# Patient Record
Sex: FEMALE | Race: BLACK OR AFRICAN AMERICAN | Employment: FULL TIME | URBAN - METROPOLITAN AREA
[De-identification: names, ages, dates, MRNs, and addresses within clinical notes are randomized per-mention and may not be internally consistent; named-entity substitution may affect disease eponyms.]

---

## 2017-02-09 ENCOUNTER — ROUTINE PRENATAL (OUTPATIENT)
Dept: OBGYN CLINIC | Age: 22
End: 2017-02-09

## 2017-02-09 ENCOUNTER — HOSPITAL ENCOUNTER (OUTPATIENT)
Dept: LAB | Age: 22
Discharge: HOME | End: 2017-02-09

## 2017-02-09 VITALS
RESPIRATION RATE: 18 BRPM | SYSTOLIC BLOOD PRESSURE: 124 MMHG | HEART RATE: 86 BPM | BODY MASS INDEX: 32.44 KG/M2 | DIASTOLIC BLOOD PRESSURE: 90 MMHG | WEIGHT: 190 LBS | HEIGHT: 64 IN

## 2017-02-09 DIAGNOSIS — Z34.81 PRENATAL CARE, SUBSEQUENT PREGNANCY, FIRST TRIMESTER: Primary | ICD-10-CM

## 2017-02-09 DIAGNOSIS — Z3A.11 11 WEEKS GESTATION OF PREGNANCY: ICD-10-CM

## 2017-02-09 DIAGNOSIS — Z34.81 PRENATAL CARE, SUBSEQUENT PREGNANCY, FIRST TRIMESTER: ICD-10-CM

## 2017-02-09 LAB — SENTARA SPECIMEN COL,SENBCF: NORMAL

## 2017-02-09 PROCEDURE — 99001 SPECIMEN HANDLING PT-LAB: CPT | Performed by: OBSTETRICS & GYNECOLOGY

## 2017-02-09 NOTE — PROGRESS NOTES
PRENATAL INTAKE SUMMARY    Ms. Jabari Topete presents today for her first prenatal visit. She is  at 11w1d. Working Estimated Date of Delivery: 17 by Patient's last menstrual period was 2016. and confirmed with ultrasound. I have reviewed the patient's medical, obstetrical, social, and family histories, medications, and available lab results. SUBJECTIVE  She complains of peeling and itching of the nipples bilaterally    OBJECTIVE  Initial Physical Exam (New OB)    GENERAL APPEARANCE: {appearance:453607::\"alert, well appearing\",\"in no apparent distress. HEAD: normocephalic, atraumatic  MOUTH: mucous membranes moist, pharynx normal without lesions  THYROID: no thyromegaly or masses present  BREASTS: no masses noted, no significant tenderness, no palpable axillary nodes, peeling and flaking of the nipples bilaterally with slight weeping of the skin on right  LUNGS: clear to auscultation, no wheezes, rales or rhonchi, symmetric air entry  HEART: regular rate and rhythm, no murmurs  ABDOMEN: soft, nontender, nondistended, no abnormal masses, no epigastric pain, fundus not palpable and FHT present  EXTREMITIES: no redness or tenderness in the calves or thighs, no edema  SKIN: normal coloration and turgor, no rashes  LYMPH NODES: no adenopathy palpable  NEUROLOGIC: alert, oriented, normal speech, no focal findings or movement disorder noted    PELVIC EXAM EXTERNAL GENITALIA: normal appearing vulva with no masses, tenderness or lesions  VAGINA: no abnormal discharge or lesions  CERVIX: no lesions or cervical motion tenderness  UTERUS: gravid  ADNEXA: no masses palpable and nontender  OB EXAM PELVIMETRY: appears adequate    ASSESSMENT  Normal pregnancy  Skin changes of the nipples. Patient instructed to use Aquaphor for moisturization. If condition continues or worsens will consider skin biopsy.     PLAN  Prenatal care  See orders

## 2017-02-10 ENCOUNTER — TELEPHONE (OUTPATIENT)
Dept: OBGYN CLINIC | Age: 22
End: 2017-02-10

## 2017-02-10 LAB
ABSOLUTE LYMPHOCYTE COUNT, 10803: 2 K/UL (ref 1–4.8)
BASOPHILS # BLD: 0 K/UL (ref 0–0.2)
BASOPHILS NFR BLD: 0 % (ref 0–2)
EOSINOPHIL # BLD: 0.2 K/UL (ref 0–0.5)
EOSINOPHIL NFR BLD: 3 % (ref 0–6)
ERYTHROCYTE [DISTWIDTH] IN BLOOD BY AUTOMATED COUNT: 13.2 % (ref 10–16)
GRANULOCYTES,GRANS: 61 % (ref 40–75)
HCT VFR BLD AUTO: 38.2 % (ref 35.1–46.5)
HEP B SURFACE AG SCRN, 006510: NORMAL
HGB BLD-MCNC: 12.9 G/DL (ref 11.7–15.5)
HIV -1/0/2 AG/AB WITH REFLEX, 13463: NON REACTIVE
HIV 1 & 2 AB SER-IMP: NORMAL
LYMPHOCYTES, LYMLT: 28 % (ref 27–45)
MCH RBC QN AUTO: 30 PG (ref 26–34)
MCHC RBC AUTO-ENTMCNC: 34 G/DL (ref 32–36)
MCV RBC AUTO: 88 FL (ref 80–95)
MONOCYTES # BLD: 0.5 K/UL (ref 0.1–0.9)
MONOCYTES NFR BLD: 7 % (ref 3–9)
NEUTROPHILS # BLD AUTO: 4.4 K/UL (ref 1.8–7.7)
PLATELET # BLD AUTO: 134 K/UL (ref 140–440)
PMV BLD AUTO: 14 FL (ref 6–10.8)
RBC # BLD AUTO: 4.36 M/UL (ref 3.8–5.2)
RUBV IGG SERPL IA-ACNC: 4.3 AI
SYPHILIS (T. PALLIDUM) IGG, 15809: NON REACTIVE
WBC # BLD AUTO: 7.2 K/UL (ref 4–11)

## 2017-02-10 NOTE — TELEPHONE ENCOUNTER
Office phlebotomist was out of the office yesterday. Patient have OB panel drawn at [address] [facility] call from [facility]. Urine culture was order but they did not rcv a specimen.

## 2017-02-13 PROBLEM — D69.6 THROMBOCYTOPENIA AFFECTING PREGNANCY (HCC): Status: ACTIVE | Noted: 2017-02-13

## 2017-02-13 PROBLEM — O99.119 THROMBOCYTOPENIA AFFECTING PREGNANCY (HCC): Status: ACTIVE | Noted: 2017-02-13

## 2017-02-13 PROBLEM — Z67.91 RH NEGATIVE STATE IN ANTEPARTUM PERIOD: Status: ACTIVE | Noted: 2017-02-13

## 2017-02-13 PROBLEM — O26.899 RH NEGATIVE STATE IN ANTEPARTUM PERIOD: Status: ACTIVE | Noted: 2017-02-13

## 2017-02-14 LAB
CHLAMYDIA TRACHOMATIS THINPREP, 13342: NEGATIVE
NEISSERIA GONORRHOEAE THINPREP, 13343: NEGATIVE
PAP IMAGE GUIDED, 8900296: NORMAL
TRICHOMONAS NUC AMP-THIN PREP,13357: NEGATIVE

## 2017-02-16 LAB
ABO + RH BLD: NORMAL
ANTIBODY SCREEN INTERPRETATION, 14017: NEGATIVE
HEMOGLOBIN A1,HGBE1: 97 % (ref 96–98)
HEMOGLOBIN A2,HGBE2: 3 % (ref 2–4)
HEMOGLOBIN C,HGBE5: 0 % (ref 0–0)
HEMOGLOBIN D, 7336: 0 % (ref 0–0)
HEMOGLOBIN ELECTROPHORESIS INTERPRETATION, 406: NORMAL
HEMOGLOBIN F,HGBE3: 0 % (ref 0–0.1)
HEMOGLOBIN S SCREEN, 7338: NORMAL
HEMOGLOBIN S,HGBE4: 0 % (ref 0–0)
HEMOGLOBIN UNKNOWN, 7337: 0 % (ref 0–0)

## 2017-02-18 ENCOUNTER — HOSPITAL ENCOUNTER (EMERGENCY)
Age: 22
Discharge: HOME | End: 2017-02-18
Attending: EMERGENCY MEDICINE
Payer: COMMERCIAL

## 2017-02-18 VITALS
HEIGHT: 64 IN | SYSTOLIC BLOOD PRESSURE: 110 MMHG | HEART RATE: 86 BPM | RESPIRATION RATE: 21 BRPM | DIASTOLIC BLOOD PRESSURE: 68 MMHG | TEMPERATURE: 98.8 F | BODY MASS INDEX: 30.73 KG/M2 | WEIGHT: 180 LBS | OXYGEN SATURATION: 100 %

## 2017-02-18 DIAGNOSIS — E87.6 HYPOKALEMIA: ICD-10-CM

## 2017-02-18 DIAGNOSIS — O20.0 THREATENED MISCARRIAGE: Primary | ICD-10-CM

## 2017-02-18 DIAGNOSIS — Z29.13 NEED FOR RHOGAM DUE TO RH NEGATIVE MOTHER: ICD-10-CM

## 2017-02-18 LAB
ABO + RH BLD: NORMAL
ANION GAP BLD CALC-SCNC: 10 MMOL/L (ref 3–18)
APPEARANCE UR: CLEAR
BASOPHILS # BLD AUTO: 0 K/UL (ref 0–0.06)
BASOPHILS # BLD: 0 % (ref 0–2)
BILIRUB UR QL: NEGATIVE
BLD PROD TYP BPU: NORMAL
BLOOD GROUP ANTIBODIES SERPL: NORMAL
BPU ID: NORMAL
BUN SERPL-MCNC: 5 MG/DL (ref 7–18)
BUN/CREAT SERPL: 9 (ref 12–20)
CALCIUM SERPL-MCNC: 9 MG/DL (ref 8.5–10.1)
CHLORIDE SERPL-SCNC: 105 MMOL/L (ref 100–108)
CO2 SERPL-SCNC: 23 MMOL/L (ref 21–32)
COLOR UR: YELLOW
CREAT SERPL-MCNC: 0.58 MG/DL (ref 0.6–1.3)
DIFFERENTIAL METHOD BLD: NORMAL
EOSINOPHIL # BLD: 0.1 K/UL (ref 0–0.4)
EOSINOPHIL NFR BLD: 1 % (ref 0–5)
ERYTHROCYTE [DISTWIDTH] IN BLOOD BY AUTOMATED COUNT: 12.8 % (ref 11.6–14.5)
GLUCOSE SERPL-MCNC: 85 MG/DL (ref 74–99)
GLUCOSE UR STRIP.AUTO-MCNC: NEGATIVE MG/DL
HCT VFR BLD AUTO: 39.1 % (ref 35–45)
HGB BLD-MCNC: 13.4 G/DL (ref 12–16)
HGB UR QL STRIP: NEGATIVE
KETONES UR QL STRIP.AUTO: ABNORMAL MG/DL
LEUKOCYTE ESTERASE UR QL STRIP.AUTO: NEGATIVE
LYMPHOCYTES # BLD AUTO: 25 % (ref 21–52)
LYMPHOCYTES # BLD: 1.9 K/UL (ref 0.9–3.6)
MCH RBC QN AUTO: 30 PG (ref 24–34)
MCHC RBC AUTO-ENTMCNC: 34.3 G/DL (ref 31–37)
MCV RBC AUTO: 87.7 FL (ref 74–97)
MONOCYTES # BLD: 0.3 K/UL (ref 0.05–1.2)
MONOCYTES NFR BLD AUTO: 4 % (ref 3–10)
NEUTS SEG # BLD: 5.3 K/UL (ref 1.8–8)
NEUTS SEG NFR BLD AUTO: 70 % (ref 40–73)
NITRITE UR QL STRIP.AUTO: NEGATIVE
PH UR STRIP: 6 [PH] (ref 5–8)
PLATELET # BLD AUTO: 137 K/UL (ref 135–420)
PMV BLD AUTO: 11.7 FL (ref 9.2–11.8)
POTASSIUM SERPL-SCNC: 3.4 MMOL/L (ref 3.5–5.5)
PROT UR STRIP-MCNC: NEGATIVE MG/DL
RBC # BLD AUTO: 4.46 M/UL (ref 4.2–5.3)
SERVICE CMNT-IMP: NORMAL
SODIUM SERPL-SCNC: 138 MMOL/L (ref 136–145)
SP GR UR REFRACTOMETRY: 1.03 (ref 1–1.03)
SPECIMEN EXP DATE BLD: NORMAL
STATUS OF UNIT,%ST: NORMAL
UNIT DIVISION, %UDIV: 0
UROBILINOGEN UR QL STRIP.AUTO: 0.2 EU/DL (ref 0.2–1)
WBC # BLD AUTO: 7.8 K/UL (ref 4.6–13.2)
WET PREP GENITAL: NORMAL

## 2017-02-18 PROCEDURE — 99284 EMERGENCY DEPT VISIT MOD MDM: CPT

## 2017-02-18 PROCEDURE — 81003 URINALYSIS AUTO W/O SCOPE: CPT | Performed by: EMERGENCY MEDICINE

## 2017-02-18 PROCEDURE — 74011250637 HC RX REV CODE- 250/637: Performed by: EMERGENCY MEDICINE

## 2017-02-18 PROCEDURE — 74011250636 HC RX REV CODE- 250/636: Performed by: EMERGENCY MEDICINE

## 2017-02-18 PROCEDURE — 96372 THER/PROPH/DIAG INJ SC/IM: CPT

## 2017-02-18 PROCEDURE — 96361 HYDRATE IV INFUSION ADD-ON: CPT

## 2017-02-18 PROCEDURE — 96360 HYDRATION IV INFUSION INIT: CPT

## 2017-02-18 PROCEDURE — 86900 BLOOD TYPING SEROLOGIC ABO: CPT | Performed by: EMERGENCY MEDICINE

## 2017-02-18 PROCEDURE — 36415 COLL VENOUS BLD VENIPUNCTURE: CPT | Performed by: EMERGENCY MEDICINE

## 2017-02-18 PROCEDURE — 87491 CHLMYD TRACH DNA AMP PROBE: CPT | Performed by: EMERGENCY MEDICINE

## 2017-02-18 PROCEDURE — 85025 COMPLETE CBC W/AUTO DIFF WBC: CPT | Performed by: EMERGENCY MEDICINE

## 2017-02-18 PROCEDURE — 80048 BASIC METABOLIC PNL TOTAL CA: CPT | Performed by: EMERGENCY MEDICINE

## 2017-02-18 PROCEDURE — 87210 SMEAR WET MOUNT SALINE/INK: CPT | Performed by: EMERGENCY MEDICINE

## 2017-02-18 RX ORDER — POTASSIUM CHLORIDE 20 MEQ/1
40 TABLET, EXTENDED RELEASE ORAL
Status: COMPLETED | OUTPATIENT
Start: 2017-02-18 | End: 2017-02-18

## 2017-02-18 RX ADMIN — HUMAN RHO(D) IMMUNE GLOBULIN 0.3 MG: 300 INJECTION, SOLUTION INTRAMUSCULAR at 12:36

## 2017-02-18 RX ADMIN — SODIUM CHLORIDE 1000 ML: 900 INJECTION, SOLUTION INTRAVENOUS at 10:41

## 2017-02-18 RX ADMIN — POTASSIUM CHLORIDE 40 MEQ: 20 TABLET, EXTENDED RELEASE ORAL at 11:46

## 2017-02-18 NOTE — ED NOTES
Pt discharged to home ambulatory and in company of friends  Discharge instructions provided via discussion and handout. Teaching to patient. Verbalized understanding. No questions voiced. Discharged with 0 RX.

## 2017-02-18 NOTE — ED NOTES
Bedside and Verbal shift change report given to Mohsen Malik (oncoming nurse) by Searcy Koyanagi (offgoing nurse). Report included the following information SBAR and ED Summary.

## 2017-02-18 NOTE — DISCHARGE INSTRUCTIONS
Threatened Miscarriage: Care Instructions  Your Care Instructions    Some women have light spotting or bleeding during the first 12 weeks of pregnancy. In some cases this is normal. Light spotting or bleeding can also be a sign of a possible loss of the pregnancy. This is called a threatened miscarriage. At this point, the doctor may not be able to tell if your vaginal bleeding is normal or is a sign of a miscarriage. In early pregnancy, things such as stress, exercise, and sex do not cause miscarriage. You may be worried or upset about the possibility of losing your pregnancy. But do not blame yourself. There is no treatment to stop a threatened miscarriage. If you do have a miscarriage, there was nothing you could have done to prevent it. A miscarriage usually means that the pregnancy is not developing normally. The doctor has checked you carefully, but problems can develop later. If you notice any problems or new symptoms, get medical treatment right away. Follow-up care is a key part of your treatment and safety. Be sure to make and go to all appointments, and call your doctor if you are having problems. It's also a good idea to know your test results and keep a list of the medicines you take. How can you care for yourself at home? · If you do have a miscarriage, you will probably have some vaginal bleeding for 1 to 2 weeks. Use pads instead of tampons. · Take acetaminophen (Tylenol) for cramps. Read and follow all instructions on the label. · Do not take two or more pain medicines at the same time unless the doctor told you to. Many pain medicines have acetaminophen, which is Tylenol. Too much acetaminophen (Tylenol) can be harmful. · Do not have sex until your doctor says it is okay. · Get lots of rest over the next several days. · You may do your normal activities if you feel well enough to do them. But do not do any heavy exercise until your doctor says it is okay.   · Eat a balanced diet that is high in iron and vitamin C. Foods rich in iron include red meat, shellfish, eggs, beans, and leafy green vegetables. Foods high in vitamin C include citrus fruits, tomatoes, and broccoli. Talk to your doctor about whether you need to take iron pills or a multivitamin. · Do not drink alcohol or use tobacco or illegal drugs. · Do not smoke. If you need help quitting, talk to your doctor about stop-smoking programs and medicines. These can increase your chances of quitting for good. When should you call for help? Call 911 anytime you think you may need emergency care. For example, call if:  · You have sudden, severe pain in your belly or pelvis. · You passed out (lost consciousness). · You have severe vaginal bleeding. Call your doctor now or seek immediate medical care if:  · You are dizzy or lightheaded, or you feel like you may faint. · You have new or increased pain in your belly or pelvis. · Your vaginal bleeding is getting worse. · You have increased pain in the vaginal area. · You have a fever. · You think you may have passed tissue. Save any tissue that you pass. Take it to your doctor's office as soon as you can. Watch closely for changes in your health, and be sure to contact your doctor if:  · You have new or worse vaginal discharge. · You do not get better as expected. Where can you learn more? Go to http://rosalio-emely.info/. Enter B599 in the search box to learn more about \"Threatened Miscarriage: Care Instructions. \"  Current as of: May 30, 2016  Content Version: 11.1  © 0873-9347 [employer]. Care instructions adapted under license by [employer] (which disclaims liability or warranty for this information). If you have questions about a medical condition or this instruction, always ask your healthcare professional. [address] any warranty or liability for your use of this information.          Hypokalemia: Care Instructions  Your Care Instructions  Hypokalemia (say \"jn-pn-mkj-RAINER-agustín-uh\") is a low level of potassium. The heart, muscles, kidneys, and nervous system all need potassium to work well. This problem has many different causes. Kidney problems, diet, and medicines like diuretics and laxatives can cause it. So can vomiting or diarrhea. In some cases, cancer is the cause. Your doctor may do tests to find the cause of your low potassium levels. You may need medicines to bring your potassium levels back to normal. You may also need regular blood tests to check your potassium. If you have very low potassium, you may need intravenous (IV) medicines. You also may need tests to check the electrical activity of your heart. Heart problems caused by low potassium levels can be very serious. Follow-up care is a key part of your treatment and safety. Be sure to make and go to all appointments, and call your doctor if you are having problems. It's also a good idea to know your test results and keep a list of the medicines you take. How can you care for yourself at home? · If your doctor recommends it, eat foods that have a lot of potassium. These include fresh fruits, juices, and vegetables. They also include nuts, beans, and milk. · Be safe with medicines. If your doctor prescribes medicines or potassium supplements, take them exactly as directed. Call your doctor if you have any problems with your medicines. · Get your potassium levels tested as often as your doctor tells you. When should you call for help? Call 911 anytime you think you may need emergency care. For example, call if:  · You feel like your heart is missing beats. Heart problems caused by low potassium can cause death. · You passed out (lost consciousness). · You have a seizure. Call your doctor now or seek immediate medical care if:  · You feel weak or unusually tired. · You have severe arm or leg cramps. · You have tingling or numbness.   · You feel sick to your stomach, or you vomit. · You have belly cramps. · You feel bloated or constipated. · You have to urinate a lot. · You feel very thirsty most of the time. · You are dizzy or lightheaded, or you feel like you may faint. · You feel depressed, or you lose touch with reality. Watch closely for changes in your health, and be sure to contact your doctor if:  · You do not get better as expected. Where can you learn more? Go to http://rosalio-emely.info/. Enter G358 in the search box to learn more about \"Hypokalemia: Care Instructions. \"  Current as of: July 28, 2016  Content Version: 11.1  © 3566-9554 [employer]. Care instructions adapted under license by [employer] (which disclaims liability or warranty for this information). If you have questions about a medical condition or this instruction, always ask your healthcare professional. [address] any warranty or liability for your use of this information.

## 2017-02-18 NOTE — ED PROVIDER NOTES
HPI Comments: Patient with history of HTN, A1 at 12w3d by last OB ultrasound presents via walk-in triage with abdominal cramping and vaginal bleeding. Reports bright red blood clots started today denies any fever or chills prior vaginal discharge. Has had one prior spontaneous , followed by Dr. Awilda Conklin. Patient is a 24 y.o. female presenting with vaginal bleeding. Vaginal Bleeding   Pertinent negatives include no chest pain, no headaches and no shortness of breath. Past Medical History:   Diagnosis Date    Anxiety     Depression        Past Surgical History:   Procedure Laterality Date    Hx hernia repair  childhood         Family History:   Problem Relation Age of Onset    No Known Problems Mother     Diabetes Father     No Known Problems Maternal Grandmother     No Known Problems Maternal Grandfather     No Known Problems Paternal Grandmother     No Known Problems Paternal Grandfather        Social History     Social History    Marital status: SINGLE     Spouse name: N/A    Number of children: N/A    Years of education: N/A     Occupational History    Not on file. Social History Main Topics    Smoking status: Never Smoker    Smokeless tobacco: Not on file    Alcohol use No    Drug use: No    Sexual activity: Yes     Partners: Male     Birth control/ protection: None     Other Topics Concern    Not on file     Social History Narrative         ALLERGIES: Tramadol    Review of Systems   Constitutional: Negative for fever. HENT: Negative for nosebleeds. Eyes: Negative for visual disturbance. Respiratory: Negative for shortness of breath. Cardiovascular: Negative for chest pain. Gastrointestinal: Negative for blood in stool. Genitourinary: Positive for vaginal bleeding. Negative for dysuria. Musculoskeletal: Negative for myalgias. Skin: Negative for rash. Neurological: Negative for headaches.    All other systems reviewed and are negative. Vitals:    02/18/17 1034 02/18/17 1054   Temp:  98.8 °F (37.1 °C)   Weight: 81.6 kg (180 lb)    Height: 5' 4\" (1.626 m)             Physical Exam   Constitutional:   General:  Well-developed, well-nourished, no apparent distress. Head:  Normocephalic atraumatic. Eyes:  Pupils midrange extraocular movements intact. No pallor or conjunctival injection. Nose:  No rhinorrhea, inspection grossly normal.    Ears:  Grossly normal to inspection, no discharge. Mouth:  Mucous membranes moist, no appreciable intraoral lesion. Neck:  Trachea midline, no asymmetry. Chest:  Grossly normal inspection, symmetric chest rise. Pulmonary:  Clear to auscultation bilaterally no wheezes rhonchi or rales. Cardiovascular:  S1-S2 no murmurs rubs or gallops. Abdomen: Soft, nontender, nondistended no guarding rebound or peritoneal signs. Pelvic:  RN Jared Solders present, serving as a female chaperone. External exam: no appreciable erythema/ecchymosis or lesion. Speculum:  OS closed, no discharge. Bimanual exam: Deferred  Extremities:  Grossly normal to inspection, peripheral pulses intact. Neurologic:  Alert and oriented no appreciable focal neurologic deficit. Psychiatric:  Grossly normal mood and affect. Nursing note reviewed, vital signs reviewed. MDM  Number of Diagnoses or Management Options  Hypokalemia:   Need for rhogam due to Rh negative mother:   Threatened miscarriage:   Diagnosis management comments: ED course:  Patient presents with vaginal bleeding, early pregnancy, Rh- in EMR. We'll give prophylaxis, pelvic examination labs and consulted OB. Bedside ultrasound intrauterine pregnancy grossly fetal movements present. Rh- 9 days ago, will order RhoGAM     Consult:  Discussed care with Dr. Aloma Krabbe (OB). Standard discussion; including history of patients chief complaint, available diagnostic results, and treatment course.   Agrees with RhoGAM, close OB/GYN follow-up  \  Patient presenting with early pregnancy. IUP was confirmed. Discussed the ramifications of her presentation with regards to her risk for completing a miscarriage in addition with her other possible complications. Understands that she will follow-up with OB/GYN without fail for further management, return here with any worrisome signs. She was given strict return precautions. Patient was instructed to return to the emergency department with any concerns. Disposition:    Discharged home      Portions of this chart were created with Dragon medical speech to text program.   Unrecognized errors may be present.         ED Course       Procedures

## 2017-02-18 NOTE — ED TRIAGE NOTES
Pt c/o vaginal bleeding since last night. Reports miscarriage last September. \" i think i am having another miscarriage. \"

## 2017-02-20 LAB
C TRACH RRNA SPEC QL NAA+PROBE: NEGATIVE
N GONORRHOEA RRNA SPEC QL NAA+PROBE: NEGATIVE
SPECIMEN SOURCE: NORMAL

## 2017-03-09 ENCOUNTER — ROUTINE PRENATAL (OUTPATIENT)
Dept: OBGYN CLINIC | Age: 22
End: 2017-03-09

## 2017-03-09 VITALS
SYSTOLIC BLOOD PRESSURE: 132 MMHG | RESPIRATION RATE: 18 BRPM | HEART RATE: 90 BPM | WEIGHT: 184 LBS | BODY MASS INDEX: 31.41 KG/M2 | DIASTOLIC BLOOD PRESSURE: 73 MMHG | HEIGHT: 64 IN

## 2017-03-09 DIAGNOSIS — D69.6 THROMBOCYTOPENIA AFFECTING PREGNANCY (HCC): ICD-10-CM

## 2017-03-09 DIAGNOSIS — O99.119 THROMBOCYTOPENIA AFFECTING PREGNANCY (HCC): ICD-10-CM

## 2017-03-09 DIAGNOSIS — Z3A.15 15 WEEKS GESTATION OF PREGNANCY: ICD-10-CM

## 2017-03-09 DIAGNOSIS — Z34.82 PRENATAL CARE, SUBSEQUENT PREGNANCY, SECOND TRIMESTER: Primary | ICD-10-CM

## 2017-03-09 NOTE — MR AVS SNAPSHOT
Visit Information Date & Time Provider Department Dept. Phone Encounter #  
 3/9/2017 10:30 AM Stephany Marin [facility] [phone] [id number] Follow-up Instructions Return in about 4 weeks (around 4/6/2017). Follow-up and Disposition History Your Appointments 3/9/2017 10:30 AM  
OB VISIT with Jessie Arreola DO  
[facility] ([facility]) Appt Note: ob  
 [address] [phone]  
[phone]  
  
   
 [facility] [phone]  
  
    
 4/5/2017  9:00 AM  
ULTRASOUND FOLLOW UP with Obgyn Ultrasound [address] [facility] ([facility]) [address] [place] [address] [address] [phone]  
  
   
 [address] [place] [address] [address] [phone] Upcoming Health Maintenance Date Due  
 HPV AGE 9Y-34Y (1 of 3 - Female 3 Dose Series) 9/22/2006 INFLUENZA AGE 9 TO ADULT 8/1/2016 PAP AKA CERVICAL CYTOLOGY 2/9/2020 Allergies as of 3/9/2017  Review Complete On: 3/9/2017 By: Jessie Arreola DO Severity Noted Reaction Type Reactions Tramadol  09/19/2016    Itching Current Immunizations  Never Reviewed Name Date Rho(D) Immune Globulin - IM 2/18/2017 12:36 PM  
  
 Not reviewed this visit You Were Diagnosed With   
  
 Codes Comments Prenatal care, subsequent pregnancy, second trimester    -  Primary ICD-10-CM: Z34.82 
ICD-9-CM: V22.1 15 weeks gestation of pregnancy     ICD-10-CM: Z3A.15 
ICD-9-CM: V22.2 Thrombocytopenia affecting pregnancy ([facility] [address]     ICD-10-CM: O99.119, D69.6 ICD-9-CM: 649.30, 287.5 Vitals BP Pulse Resp Height(growth percentile) Weight(growth percentile) LMP  
 132/73 90 18 5' 4\" (1.626 m) 184 lb (83.5 kg) 11/23/2016 BMI OB Status Smoking Status 31.58 kg/m2 Pregnant Never Smoker Vitals History BMI and BSA Data Body Mass Index Body Surface Area  31.58 kg/m 2 1.94 m 2  
  
  
 Your Updated Medication List  
  
   
This list is accurate as of: 3/9/17 10:08 AM.  Always use your most recent med list.  
  
  
  
  
 pnv w/o calcium-iron fum-fa 27-1 mg Tab Take  by mouth. We Performed the Following PLATELET [31823 CPT(R)] Follow-up Instructions Return in about 4 weeks (around 4/6/2017). To-Do List   
 03/09/2017 Lab:  PLATELET   
  
 14/97/3903 Imaging: AMB POC US OB >= 14 WKS, 1ST GESTATION Introducing [facility]! Alirio Diana introduces Funderbeam patient portal. Now you can access parts of your medical record, email your doctor's office, and request medication refills online. 1. In your internet browser, go to [email or web address] 2. Click on the First Time User? Click Here link in the Sign In box. You will see the New Member Sign Up page. 3. Enter your Funderbeam Access Code exactly as it appears below. You will not need to use this code after youve completed the sign-up process. If you do not sign up before the expiration date, you must request a new code. · Funderbeam Access Code: [email or web address] Expires: 5/19/2017 11:02 AM 
 
4. Enter the last four digits of your Social Security Number (xxxx) and Date of Birth (mm/dd/yyyy) as indicated and click Submit. You will be taken to the next sign-up page. 5. Create a Funderbeam ID. This will be your Funderbeam login ID and cannot be changed, so think of one that is secure and easy to remember. 6. Create a Funderbeam password. You can change your password at any time. 7. Enter your Password Reset Question and Answer. This can be used at a later time if you forget your password. 8. Enter your e-mail address. You will receive e-mail notification when new information is available in [address] 9. Click Sign Up. You can now view and download portions of your medical record. 10. Click the Download Summary menu link to download a portable copy of your medical information. If you have questions, please visit the Frequently Asked Questions section of the "[employer]t website. Remember, [employer]wi [employer] is NOT to be used for urgent needs. For medical emergencies, dial 911. Now available from your iPhone and Android! Please provide this summary of care documentation to your next provider. Your primary care clinician is listed as NONE. If you have any questions after today's visit, please call [phone].

## 2017-03-09 NOTE — PROGRESS NOTES
Patient without complaints, states that the dry skin on her nipples is still present but improved. She was seen at the ED 3 weeks ago for vaginal bleeding and given Rhogam. She has had no further bleeding since then. Breast exam shows dry and flaking skin uniformly across bilateral nipples. No discrete lesions and no weeping of the skin. Patient encouraged to continue the use of Aquaphor. Mild thrombocytopenia noted on prenatal labs, will recheck today. Morphology scan ordered. Follow up 4 weeks.

## 2017-03-10 DIAGNOSIS — D69.6 THROMBOCYTOPENIA AFFECTING PREGNANCY (HCC): Primary | ICD-10-CM

## 2017-03-10 DIAGNOSIS — O99.119 THROMBOCYTOPENIA AFFECTING PREGNANCY (HCC): Primary | ICD-10-CM

## 2017-03-10 LAB — PLATELET # BLD AUTO: 126 K/UL (ref 140–440)

## 2017-03-20 NOTE — PROGRESS NOTES
Have attempted to contact the patient multiple times by phone and number is always busy. Referral to hematology entered due to thrombocytopenia in early pregnancy.

## 2017-03-31 ENCOUNTER — OFFICE VISIT (OUTPATIENT)
Dept: ONCOLOGY | Age: 22
End: 2017-03-31

## 2017-03-31 ENCOUNTER — HOSPITAL ENCOUNTER (OUTPATIENT)
Dept: INFUSION THERAPY | Age: 22
Discharge: HOME | End: 2017-03-31
Payer: COMMERCIAL

## 2017-03-31 VITALS
TEMPERATURE: 98 F | HEART RATE: 90 BPM | HEIGHT: 64 IN | WEIGHT: 200 LBS | DIASTOLIC BLOOD PRESSURE: 64 MMHG | OXYGEN SATURATION: 100 % | BODY MASS INDEX: 34.15 KG/M2 | SYSTOLIC BLOOD PRESSURE: 109 MMHG | RESPIRATION RATE: 17 BRPM

## 2017-03-31 VITALS
SYSTOLIC BLOOD PRESSURE: 109 MMHG | OXYGEN SATURATION: 100 % | RESPIRATION RATE: 17 BRPM | TEMPERATURE: 98 F | DIASTOLIC BLOOD PRESSURE: 64 MMHG | HEART RATE: 90 BPM

## 2017-03-31 DIAGNOSIS — Z67.91 RH NEGATIVE STATE IN ANTEPARTUM PERIOD, SECOND TRIMESTER: ICD-10-CM

## 2017-03-31 DIAGNOSIS — D69.6 THROMBOCYTOPENIA AFFECTING PREGNANCY (HCC): ICD-10-CM

## 2017-03-31 DIAGNOSIS — D69.6 THROMBOCYTOPENIA AFFECTING PREGNANCY (HCC): Primary | ICD-10-CM

## 2017-03-31 DIAGNOSIS — O99.119 THROMBOCYTOPENIA AFFECTING PREGNANCY (HCC): Primary | ICD-10-CM

## 2017-03-31 DIAGNOSIS — O99.119 THROMBOCYTOPENIA AFFECTING PREGNANCY (HCC): ICD-10-CM

## 2017-03-31 DIAGNOSIS — O26.892 RH NEGATIVE STATE IN ANTEPARTUM PERIOD, SECOND TRIMESTER: ICD-10-CM

## 2017-03-31 LAB
BASOPHILS # BLD AUTO: 0 K/UL (ref 0–0.06)
BASOPHILS # BLD: 0 % (ref 0–2)
DIFFERENTIAL METHOD BLD: ABNORMAL
EOSINOPHIL # BLD: 0.2 K/UL (ref 0–0.4)
EOSINOPHIL NFR BLD: 2 % (ref 0–5)
ERYTHROCYTE [DISTWIDTH] IN BLOOD BY AUTOMATED COUNT: 12.7 % (ref 11.6–14.5)
FERRITIN SERPL-MCNC: 23 NG/ML (ref 8–388)
FOLATE SERPL-MCNC: 19.5 NG/ML (ref 3.1–17.5)
HCT VFR BLD AUTO: 40 % (ref 35–45)
HGB BLD-MCNC: 13.4 G/DL (ref 12–16)
LYMPHOCYTES # BLD AUTO: 21 % (ref 21–52)
LYMPHOCYTES # BLD: 2.2 K/UL (ref 0.9–3.6)
MCH RBC QN AUTO: 29.7 PG (ref 24–34)
MCHC RBC AUTO-ENTMCNC: 33.5 G/DL (ref 31–37)
MCV RBC AUTO: 88.7 FL (ref 74–97)
MONOCYTES # BLD: 0.5 K/UL (ref 0.05–1.2)
MONOCYTES NFR BLD AUTO: 5 % (ref 3–10)
NEUTS SEG # BLD: 7.3 K/UL (ref 1.8–8)
NEUTS SEG NFR BLD AUTO: 72 % (ref 40–73)
PLATELET # BLD AUTO: 137 K/UL (ref 135–420)
PMV BLD AUTO: 13 FL (ref 9.2–11.8)
RBC # BLD AUTO: 4.51 M/UL (ref 4.2–5.3)
VIT B12 SERPL-MCNC: 484 PG/ML (ref 211–911)
WBC # BLD AUTO: 10.2 K/UL (ref 4.6–13.2)

## 2017-03-31 PROCEDURE — 82607 VITAMIN B-12: CPT | Performed by: INTERNAL MEDICINE

## 2017-03-31 PROCEDURE — 36415 COLL VENOUS BLD VENIPUNCTURE: CPT

## 2017-03-31 PROCEDURE — 85025 COMPLETE CBC W/AUTO DIFF WBC: CPT | Performed by: INTERNAL MEDICINE

## 2017-03-31 PROCEDURE — 85613 RUSSELL VIPER VENOM DILUTED: CPT | Performed by: INTERNAL MEDICINE

## 2017-03-31 PROCEDURE — 82728 ASSAY OF FERRITIN: CPT | Performed by: INTERNAL MEDICINE

## 2017-03-31 PROCEDURE — 86146 BETA-2 GLYCOPROTEIN ANTIBODY: CPT | Performed by: INTERNAL MEDICINE

## 2017-03-31 NOTE — MR AVS SNAPSHOT
Visit Information Date & Time Provider Department Dept. Phone Encounter #  
 3/31/2017 11:30 AM Ilir Robison, [place] [address] Oncology [phone] [id number] Follow-up Instructions Return in about 3 weeks (around 4/21/2017), or if symptoms worsen or fail to improve, for re-evaluate. Your Appointments 4/5/2017  9:00 AM  
ULTRASOUND FOLLOW UP with Obgyn Ultrasound [address] [facility] ([facility]) Appt Note: ob; morphology [facility] [address] [facility] [phone]  
  
    
 4/5/2017  9:30 AM  
OB VISIT with Jessa Bowman DO  
[facility] ([facility]) Appt Note: ob  
 [facility] [address] [facility] [address] [phone]  
  
    
 4/21/2017 11:00 AM  
Follow Up with Ilir Robison MD  
[address] [place] [address] Oncology [facility]) Appt Note: 3 week follow-up [address] [facility]  
  
   
 [id number] [place] [address] [address] [place] [address] Upcoming Health Maintenance Date Due  
 HPV AGE 9Y-34Y (1 of 3 - Female 3 Dose Series) 9/22/2006 INFLUENZA AGE 9 TO ADULT 8/1/2016 DTaP/Tdap/Td series (1 - Tdap) 9/22/2016 PAP AKA CERVICAL CYTOLOGY 2/9/2020 Allergies as of 3/31/2017  Review Complete On: 3/9/2017 By: Jessa Bowman DO Severity Noted Reaction Type Reactions Tramadol  09/19/2016    Itching Current Immunizations  Never Reviewed Name Date Rho(D) Immune Globulin - IM 2/18/2017 12:36 PM  
  
 Not reviewed this visit You Were Diagnosed With   
  
 Codes Comments Thrombocytopenia affecting pregnancy ([facility] [address]    -  Primary ICD-10-CM: O99.119, D69.6 ICD-9-CM: 649.30, 287.5 Rh negative state in antepartum period, second trimester     ICD-10-CM: O09.892 ICD-9-CM: V23.89 Vitals BP Pulse Temp Resp Height(growth percentile) Weight(growth percentile) 109/64 (BP 1 Location: Left arm, BP Patient Position: Sitting) 90 98 °F (36.7 °C) (Oral) 17 5' 4\" (1.626 m) 200 lb (90.7 kg) LMP SpO2 BMI OB Status Smoking Status 11/23/2016 100% 34.33 kg/m2 Pregnant Never Smoker Vitals History BMI and BSA Data Body Mass Index Body Surface Area  
 34.33 kg/m 2 2.02 m 2 Your Updated Medication List  
  
   
This list is accurate as of: 3/31/17  1:39 PM.  Always use your most recent med list.  
  
  
  
  
 pnv w/o calcium-iron fum-fa 27-1 mg Tab Take  by mouth. Follow-up Instructions Return in about 3 weeks (around 4/21/2017), or if symptoms worsen or fail to improve, for re-evaluate. To-Do List   
 03/31/2017 Lab:  ANTIPHOSPHOLIPID SYNDROME PANEL   
  
 03/31/2017 Lab:  CBC WITH AUTOMATED DIFF   
  
 03/31/2017 Lab:  FERRITIN   
  
 03/31/2017 Lab:  LUPUS ANTICOAGULANT PANEL W/ REFLEX   
  
 03/31/2017 Lab:  VITAMIN B12 & FOLATE Introducing [facility]! Dear Jayne Mata: Thank you for requesting a [employer] account. Our records indicate that you already have an active [employer] account. You can access your account anytime at [email or web address] Did you know that you can access your hospital and ER discharge instructions at any time in [employer]? You can also review all of your test results from your hospital stay or ER visit. Additional Information If you have questions, please visit the Frequently Asked Questions section of the [employer] website at [email or web address] Remember, [employer] is NOT to be used for urgent needs. For medical emergencies, dial 911. Now available from your iPhone and Android! Please provide this summary of care documentation to your next provider. Your primary care clinician is listed as NONE.  If you have any questions after today's visit, please call [phone].

## 2017-03-31 NOTE — PROGRESS NOTES
[facility] Progress Note    Date: 2017    Name: Moises Best    MRN: [id number]         : 1995      Ms. Conte was assessed and education was provided. Ms. Conte's vitals were reviewed and patient was observed for 5 minutes prior to treatment. Visit Vitals    /64 (BP 1 Location: Left arm, BP Patient Position: Sitting)    Pulse 90    Temp 98 °F (36.7 °C)    Resp 17    SpO2 100%       Lab results were obtained and reviewed. Recent Results (from the past 12 hour(s))   VITAMIN B12 & FOLATE    Collection Time: 17  1:20 PM   Result Value Ref Range    Vitamin B12 484 211 - 911 pg/mL    Folate 19.5 (H) 3.10 - 17.50 ng/mL   FERRITIN    Collection Time: 17  1:20 PM   Result Value Ref Range    Ferritin 23 8 - 388 NG/ML   CBC WITH AUTOMATED DIFF    Collection Time: 17  1:20 PM   Result Value Ref Range    WBC 10.2 4.6 - 13.2 K/uL    RBC 4.51 4.20 - 5.30 M/uL    HGB 13.4 12.0 - 16.0 g/dL    HCT 40.0 35.0 - 45.0 %    MCV 88.7 74.0 - 97.0 FL    MCH 29.7 24.0 - 34.0 PG    MCHC 33.5 31.0 - 37.0 g/dL    RDW 12.7 11.6 - 14.5 %    PLATELET 937 733 - 141 K/uL    MPV 13.0 (H) 9.2 - 11.8 FL    NEUTROPHILS 72 40 - 73 %    LYMPHOCYTES 21 21 - 52 %    MONOCYTES 5 3 - 10 %    EOSINOPHILS 2 0 - 5 %    BASOPHILS 0 0 - 2 %    ABS. NEUTROPHILS 7.3 1.8 - 8.0 K/UL    ABS. LYMPHOCYTES 2.2 0.9 - 3.6 K/UL    ABS. MONOCYTES 0.5 0.05 - 1.2 K/UL    ABS. EOSINOPHILS 0.2 0.0 - 0.4 K/UL    ABS. BASOPHILS 0.0 0.0 - 0.06 K/UL    DF AUTOMATED         Venipuncture to right antecubital vein x1 attempt, gauze and coban applied to site. Specimen sent to lab for processing. Ms. Lucía Metzger tolerated the infusion, and had no complaints. Patient armband removed and shredded. Ms. Lucía Metzger was discharged from [place] [address] in stable condition at 1335. She is to follow up with physician for her next appointment.     Prema English RN  2017  4:14 PM

## 2017-03-31 NOTE — PROGRESS NOTES
Megan Rubio is a 24 y.o. female who presents today to establish care    1. Have you been to the ER, urgent care clinic since your last visit? Hospitalized since your last visit? NO    2. Have you seen or consulted any other health care providers outside of the [address] [place] [address] since your last visit? Include any pap smears or colon screening. NO    Health Maintenance reviewed.     Health Maintenance Due   Topic Date Due    HPV AGE 9Y-26Y (1 of 3 - Female 3 Dose Series) 09/22/2006    INFLUENZA AGE 9 TO ADULT  08/01/2016    DTaP/Tdap/Td series (1 - Tdap) 09/22/2016

## 2017-03-31 NOTE — PROGRESS NOTES
[facility] HEMATOLOGY-MEDICAL ONCOLOGY     Patient Active Problem List   Diagnosis Code    [facility] O99.119, D69.6    Rh negative state in antepartum period O09.899         Assessment/ Plan:     1.) Thrombocytopenia of Pregnancy  -Mild ITP vs. Thrombocytopenia of pregnancy  -Lab workup ordered  -Currently 18 weeks gestation with her second pregnancy  -Platelet count today improved to 137K  -No previous history of low platelet count, splenomegaly, Hepatitis, or HIV  -RTC. In 3 weeks for a repeat CBC and to review workup    2.) Previous miscarriage  -APL-Antibodies and Lupus anticoag. Assay ordered    Thank you for the opportunity to participate in the care, please do not hesitate to call for any questions or concerns. I have discussed the diagnosis with the patient and the intended plan. The patient verbalized understanding and agrees with the plan. History:   Sarah Perez is a 24 y.o. female presenting today for: Thrombocytopenia of Pregnancy  -Mild ITP vs. Thrombocytopenia of pregnancy  -Lab workup ordered  -Currently 18 weeks gestation with her second pregnancy  -Platelet count today improved to 137K  -No previous history of low platelet count, splenomegaly, Hepatitis, or HIV      Current Outpatient Prescriptions   Medication Sig Dispense Refill    pnv w/o calcium-iron fum-fa 27-1 mg tab Take  by mouth.          Objective:   VS:    Visit Vitals    Resp 17    Ht 5' 4\" (1.626 m)    Wt 90.7 kg (200 lb)    LMP 11/23/2016    SpO2 100%    BMI 34.33 kg/m2        Physical Exam:     Constitutional:  well developed, well nourished, no acute distress and alert and oriented x 3    HENT:  Oral mucosa pink and moist, no cyanosis observed and no pallor observed.    Eyes:  conjunctiva normal, upper and lower eyelid normal bilaterally.    Neck:  No jugular venous distension present.    Cardiovascular:  heart sounds normal, normal rate and regular rhythm   Pulmonary/Chest Wall:  breath sounds are clear bilaterally and no use of accessory muscles in breathing.    Abdominal:  Non tender, no palpable masses, no hepatosplenomegaly, and no abdominal bruits. fundal ht. 18 wks. Musculoskeletal:  No digital clubbing or cyanosis. Normal muscle strength and tone.    Skin:  Normal and no rashes or lesions    Peripheral Vascular:  No edema present in extremities. Femoral pulse normal bilaterally. Dorsalis pedis pulse normal bilaterally. Review of Systems  Constitutional:  Fever: Negative, Chills: Negative, Weight Loss: Negative, Malaise/Fatigue: Negative  Diaphoresis: Negative, Weakness: Negative  Skin:  Rash: Negative, Itching: Negative  HENT:  Headache:Negative, Hearing Loss: Negative, Tinnitus: Negative, Ear Pain: Negative  Nosebleeds: Negative, Congestion: Negative, Stridor: Negative,  Sore Throat: Negative  Eyes:   Blurred Vision: Negative, Double Vision: Negative, Photophobia: Negative  Eye Pain: Negative, Eye Discharge: Negative, Eye Redness: Negative  Cardiovascular:   Chest Pain: Negative, Palpitations: Negative, Orthopnea: Negative  Claudication: Negative, Leg Swelling: Negative PND: Negative  Respiratory:  Cough: Negative, Hemoptysis: Negative, Sputum Production: Negative  Shortness of Breath: Negative, Wheezing: Negative  Gastrointestinal:  Heartburn: Negative, Nausea: Negative, Vomiting: Negative  Abdominal Pain: Negative, Diarrhea: Negative, Constipation: Negative  Blood in Stool: Negative, Melena: Negative   Genitourinary:   Dysuria: Negative, Urgency: Negative, Frequency: Negative  Hematuria: Negative, Flank Pain: Negative  Musculoskeletal:   Myalgias: Negative, Neck Pain: Negative, Back Pain: Negative    Joint Pain: Negative, Falls: Negative  Endo/Heme/Allergies:   Easy Bruise/Blood: Negative, Env. Allergies: Negative, Polydipsia: Negative   Neurological:   Dizziness: Negative, Tingling: Negative.  Tremor: Negative,    Sensory Change: Negative. Speech Change: Negative, Focal Weakness: Negative Seizures: Negative, LOC: Negative  Psychiatric:  Depression: Negative, Suicidal Ideas: Negative, Substance Abuse: Negative  Hallucinations: Negative, Nervous/Anxious: Negative  Insomnia: Negative, Memory Loss: Negative     No results found for this or any previous visit (from the past 168 hour(s)). Past Medical History:   Diagnosis Date    Anxiety     Depression        Past Surgical History:   Procedure Laterality Date    HX HERNIA REPAIR  childhood       Social History     Social History    Marital status: SINGLE     Spouse name: N/A    Number of children: N/A    Years of education: N/A     Occupational History    Not on file.      Social History Main Topics    Smoking status: Never Smoker    Smokeless tobacco: Never Used    Alcohol use No    Drug use: No    Sexual activity: Yes     Partners: Male     Birth control/ protection: None     Other Topics Concern    Not on file     Social History Narrative       Family History   Problem Relation Age of Onset    No Known Problems Mother     Diabetes Father     No Known Problems Maternal Grandmother     No Known Problems Maternal Grandfather     No Known Problems Paternal Grandmother     No Known Problems Paternal Grandfather        Allergies   Allergen Reactions    Tramadol Itching       Follow-up Disposition: Not on File    Lupillo Garcia MD  Hematology-Medical Oncology

## 2017-04-01 LAB
LA PPP-IMP: NORMAL
SCREEN APTT: 33.6 SEC (ref 0–43.6)
SCREEN DRVVT: 37 SEC (ref 0–44)

## 2017-04-04 LAB — RUBELLA, EXTERNAL: NORMAL

## 2017-04-05 ENCOUNTER — ROUTINE PRENATAL (OUTPATIENT)
Dept: OBGYN CLINIC | Age: 22
End: 2017-04-05

## 2017-04-05 VITALS
DIASTOLIC BLOOD PRESSURE: 75 MMHG | BODY MASS INDEX: 33.8 KG/M2 | WEIGHT: 198 LBS | SYSTOLIC BLOOD PRESSURE: 124 MMHG | RESPIRATION RATE: 18 BRPM | HEIGHT: 64 IN | HEART RATE: 76 BPM

## 2017-04-05 DIAGNOSIS — Z3A.19 19 WEEKS GESTATION OF PREGNANCY: ICD-10-CM

## 2017-04-05 DIAGNOSIS — O36.5920 IUGR (INTRAUTERINE GROWTH RESTRICTION) AFFECTING CARE OF MOTHER, SECOND TRIMESTER, NOT APPLICABLE OR UNSPECIFIED FETUS: ICD-10-CM

## 2017-04-05 DIAGNOSIS — Z34.82 PRENATAL CARE, SUBSEQUENT PREGNANCY, SECOND TRIMESTER: Primary | ICD-10-CM

## 2017-04-07 ENCOUNTER — ROUTINE PRENATAL (OUTPATIENT)
Dept: OBGYN CLINIC | Age: 22
End: 2017-04-07

## 2017-04-07 VITALS
HEIGHT: 64 IN | WEIGHT: 200 LBS | HEART RATE: 109 BPM | RESPIRATION RATE: 18 BRPM | DIASTOLIC BLOOD PRESSURE: 80 MMHG | SYSTOLIC BLOOD PRESSURE: 137 MMHG | BODY MASS INDEX: 34.15 KG/M2

## 2017-04-07 DIAGNOSIS — B37.31 YEAST VAGINITIS: ICD-10-CM

## 2017-04-07 DIAGNOSIS — R87.9 ABNORMAL VAGINAL FLUIDS: Primary | ICD-10-CM

## 2017-04-07 LAB — WET MOUNT POCT, WMPOCT: NORMAL

## 2017-04-07 RX ORDER — TERCONAZOLE 4 MG/G
1 CREAM VAGINAL
Qty: 45 G | Refills: 0 | Status: SHIPPED | OUTPATIENT
Start: 2017-04-07 | End: 2017-04-14

## 2017-04-07 NOTE — PROGRESS NOTES
Patient presents for a problem visit complaining of leaking of vaginal fluid which began this am. She denies cramping or contractions and feels very slight fetal movement. She denies any abnormal vaginal itching, irritation, or odor. SSE shows no pooling, Nitrazine negative, thick white discharge present, fern negative  Wet prep: no clue cells, positive budding yeast, no trich, minimal WBCs  Yeast vaginitis, no evidence of SROM. Rx Terazol. Follow up as scheduled.

## 2017-04-08 LAB
APTT HEX PL PPP: 2 SEC
APTT IMM NP PPP: NORMAL SEC
APTT PPP 1:1 SALINE: NORMAL SEC
APTT PPP: 26 SEC
B2 GLYCOPROT1 IGA SER-ACNC: <10 SAU
B2 GLYCOPROT1 IGG SER-ACNC: <10 SGU
B2 GLYCOPROT1 IGM SER-ACNC: <10 SMU
CARDIOLIPIN IGA SER IA-ACNC: <10 APL
CARDIOLIPIN IGG SER IA-ACNC: <10 GPL
CARDIOLIPIN IGM SER IA-ACNC: <10 MPL
CONFIRM DRVVT: NORMAL SEC
LA NT PLATELET PPP: 0.4 SEC
LAC INTERPRETATION, 502038: NORMAL
PROTHROM IGG SERPL-ACNC: 13 G UNITS
PS IGG SER IA-ACNC: 0 GPS
PS IGM SER IA-ACNC: 2 MPS
SCREEN DRVVT/NORMAL: NORMAL RATIO
SCREEN DRVVT: 37.8 SEC

## 2017-04-21 ENCOUNTER — OFFICE VISIT (OUTPATIENT)
Dept: ONCOLOGY | Age: 22
End: 2017-04-21

## 2017-04-21 ENCOUNTER — HOSPITAL ENCOUNTER (OUTPATIENT)
Dept: INFUSION THERAPY | Age: 22
Discharge: HOME | End: 2017-04-21
Payer: COMMERCIAL

## 2017-04-21 VITALS
TEMPERATURE: 98.6 F | SYSTOLIC BLOOD PRESSURE: 138 MMHG | DIASTOLIC BLOOD PRESSURE: 72 MMHG | RESPIRATION RATE: 18 BRPM | HEART RATE: 80 BPM

## 2017-04-21 VITALS — SYSTOLIC BLOOD PRESSURE: 138 MMHG | DIASTOLIC BLOOD PRESSURE: 72 MMHG | HEART RATE: 78 BPM | RESPIRATION RATE: 20 BRPM

## 2017-04-21 DIAGNOSIS — O99.119 THROMBOCYTOPENIA AFFECTING PREGNANCY (HCC): Primary | ICD-10-CM

## 2017-04-21 DIAGNOSIS — D69.6 THROMBOCYTOPENIA AFFECTING PREGNANCY (HCC): Primary | ICD-10-CM

## 2017-04-21 DIAGNOSIS — O26.892 RH NEGATIVE STATE IN ANTEPARTUM PERIOD, SECOND TRIMESTER: ICD-10-CM

## 2017-04-21 DIAGNOSIS — Z67.91 RH NEGATIVE STATE IN ANTEPARTUM PERIOD, SECOND TRIMESTER: ICD-10-CM

## 2017-04-21 DIAGNOSIS — D69.6 THROMBOCYTOPENIA AFFECTING PREGNANCY (HCC): ICD-10-CM

## 2017-04-21 DIAGNOSIS — O99.119 THROMBOCYTOPENIA AFFECTING PREGNANCY (HCC): ICD-10-CM

## 2017-04-21 LAB
BASO+EOS+MONOS # BLD AUTO: 0.8 K/UL (ref 0–2.3)
BASO+EOS+MONOS # BLD AUTO: 8 % (ref 0.1–17)
DIFFERENTIAL METHOD BLD: ABNORMAL
ERYTHROCYTE [DISTWIDTH] IN BLOOD BY AUTOMATED COUNT: 12.8 % (ref 11.5–14.5)
HCT VFR BLD AUTO: 36.5 % (ref 36–48)
HGB BLD-MCNC: 12.4 G/DL (ref 12–16)
LYMPHOCYTES # BLD AUTO: 22 % (ref 14–44)
LYMPHOCYTES # BLD: 2.2 K/UL (ref 1.1–5.9)
MCH RBC QN AUTO: 30.4 PG (ref 25–35)
MCHC RBC AUTO-ENTMCNC: 34 G/DL (ref 31–37)
MCV RBC AUTO: 89.5 FL (ref 78–102)
NEUTS SEG # BLD: 6.9 K/UL (ref 1.8–9.5)
NEUTS SEG NFR BLD AUTO: 70 % (ref 40–70)
PLATELET # BLD AUTO: 121 K/UL (ref 135–420)
RBC # BLD AUTO: 4.08 M/UL (ref 4.1–5.1)
WBC # BLD AUTO: 9.9 K/UL (ref 4.5–13)

## 2017-04-21 PROCEDURE — 85025 COMPLETE CBC W/AUTO DIFF WBC: CPT | Performed by: INTERNAL MEDICINE

## 2017-04-21 PROCEDURE — 36415 COLL VENOUS BLD VENIPUNCTURE: CPT

## 2017-04-21 PROCEDURE — 85049 AUTOMATED PLATELET COUNT: CPT | Performed by: INTERNAL MEDICINE

## 2017-04-21 NOTE — PROGRESS NOTES
[facility] HEMATOLOGY-MEDICAL ONCOLOGY     Patient Active Problem List   Diagnosis Code    [facility] O99.119, D69.6    Rh negative state in antepartum period O09.899         Assessment/ Plan:     1.) Thrombocytopenia of Pregnancy  -Mild ITP vs. Thrombocytopenia of pregnancy  -Lab workup reviewed  -Currently 20 weeks gestation with her second pregnancy  -Platelet count today > 120K  -No previous history of low platelet count, splenomegaly, Hepatitis, or HIV  -RTC. In 3-4 weeks for a repeat CBC     2.) Previous miscarriage  -APL-Antibodies and Lupus anticoag. Assay were negative    Thank you for the opportunity to participate in the care, please do not hesitate to call for any questions or concerns. I have discussed the diagnosis with the patient and the intended plan. The patient verbalized understanding and agrees with the plan. History:   Holly Acuna is a 24 y.o. female returning today for: Thrombocytopenia of Pregnancy  -Mild ITP vs. Thrombocytopenia of pregnancy  -Lab workup ordered  -Currently 20-21 weeks gestation with her second pregnancy  -Platelet count today >120K  -No previous history of low platelet count, splenomegaly, Hepatitis, or HIV      Current Outpatient Prescriptions   Medication Sig Dispense Refill    pnv w/o calcium-iron fum-fa 27-1 mg tab Take  by mouth.          Objective:   VS:    Visit Vitals    /72    Pulse 78    Resp 20    LMP 11/23/2016        Physical Exam:     Constitutional:  well developed, well nourished, no acute distress and alert and oriented x 3    HENT:  Oral mucosa pink and moist, no cyanosis observed and no pallor observed.    Eyes:  conjunctiva normal, upper and lower eyelid normal bilaterally.    Neck:  No jugular venous distension present.    Cardiovascular:  heart sounds normal, normal rate and regular rhythm   Pulmonary/Chest Wall:  breath sounds are clear bilaterally and no use of accessory muscles in breathing.    Abdominal:  Non tender, no palpable masses, no hepatosplenomegaly, and no abdominal bruits. fundal ht. 20 wks. Musculoskeletal:  No digital clubbing or cyanosis. Normal muscle strength and tone.    Skin:  Normal and no rashes or lesions    Peripheral Vascular:  No edema present in extremities. Femoral pulse normal bilaterally. Dorsalis pedis pulse normal bilaterally. Review of Systems  Constitutional:  Fever: Negative, Chills: Negative, Weight Loss: Negative, Malaise/Fatigue: Negative  Diaphoresis: Negative, Weakness: Negative  Skin:  Rash: Negative, Itching: Negative  HENT:  Headache:Negative, Hearing Loss: Negative, Tinnitus: Negative, Ear Pain: Negative  Nosebleeds: Negative, Congestion: Negative, Stridor: Negative,  Sore Throat: Negative  Eyes:   Blurred Vision: Negative, Double Vision: Negative, Photophobia: Negative  Eye Pain: Negative, Eye Discharge: Negative, Eye Redness: Negative  Cardiovascular:   Chest Pain: Negative, Palpitations: Negative, Orthopnea: Negative  Claudication: Negative, Leg Swelling: Negative PND: Negative  Respiratory:  Cough: Negative, Hemoptysis: Negative, Sputum Production: Negative  Shortness of Breath: Negative, Wheezing: Negative  Gastrointestinal:  Heartburn: Negative, Nausea: Negative, Vomiting: Negative  Abdominal Pain: Negative, Diarrhea: Negative, Constipation: Negative  Blood in Stool: Negative, Melena: Negative   Genitourinary:   Dysuria: Negative, Urgency: Negative, Frequency: Negative  Hematuria: Negative, Flank Pain: Negative  Musculoskeletal:   Myalgias: Negative, Neck Pain: Negative, Back Pain: Negative    Joint Pain: Negative, Falls: Negative  Endo/Heme/Allergies:   Easy Bruise/Blood: Negative, Env. Allergies: Negative, Polydipsia: Negative   Neurological:   Dizziness: Negative, Tingling: Negative. Tremor: Negative,    Sensory Change: Negative.  Speech Change: Negative, Focal Weakness: Negative Seizures: Negative, LOC: Negative  Psychiatric:  Depression: Negative, Suicidal Ideas: Negative, Substance Abuse: Negative  Hallucinations: Negative, Nervous/Anxious: Negative  Insomnia: Negative, Memory Loss: Negative     Recent Results (from the past 168 hour(s))   CBC WITH 3 PART DIFF    Collection Time: 04/21/17 12:10 PM   Result Value Ref Range    WBC 9.9 4.5 - 13.0 K/uL    RBC 4.08 (L) 4.10 - 5.10 M/uL    HGB 12.4 12.0 - 16.0 g/dL    HCT 36.5 36 - 48 %    MCV 89.5 78 - 102 FL    MCH 30.4 25.0 - 35.0 PG    MCHC 34.0 31 - 37 g/dL    RDW 12.8 11.5 - 14.5 %    NEUTROPHILS 70 40 - 70 %    MIXED CELLS 8 0.1 - 17 %    LYMPHOCYTES 22 14 - 44 %    ABS. NEUTROPHILS 6.9 1.8 - 9.5 K/UL    ABS. MIXED CELLS 0.8 0.0 - 2.3 K/uL    ABS. LYMPHOCYTES 2.2 1.1 - 5.9 K/UL    DF AUTOMATED     PLATELET    Collection Time: 04/21/17 12:10 PM   Result Value Ref Range    PLATELET 969 (L) 790 - 420 K/uL       Past Medical History:   Diagnosis Date    Anxiety     Depression        Past Surgical History:   Procedure Laterality Date    HX HERNIA REPAIR  childhood       Social History     Social History    Marital status: SINGLE     Spouse name: N/A    Number of children: N/A    Years of education: N/A     Occupational History    Not on file.      Social History Main Topics    Smoking status: Never Smoker    Smokeless tobacco: Never Used    Alcohol use No    Drug use: No    Sexual activity: Yes     Partners: Male     Birth control/ protection: None     Other Topics Concern    Not on file     Social History Narrative       Family History   Problem Relation Age of Onset    No Known Problems Mother     Diabetes Father     No Known Problems Maternal Grandmother     No Known Problems Maternal Grandfather     No Known Problems Paternal Grandmother     No Known Problems Paternal Grandfather        Allergies   Allergen Reactions    Tramadol Itching       Follow-up Disposition:  Return in about 1 month (around 5/21/2017), or if symptoms worsen or fail to improve.     Marlene Day MD  Hematology-Medical Oncology

## 2017-04-21 NOTE — MR AVS SNAPSHOT
Visit Information Date & Time Provider Department Dept. Phone Encounter #  
 4/21/2017 11:00 AM Kelley Murillo [place] [address] Oncology [phone] [id number] Follow-up Instructions Return in about 1 month (around 5/21/2017), or if symptoms worsen or fail to improve. Your Appointments 5/3/2017  9:30 AM  
OB VISIT with Halie Lofton DO  
[facility] Appt Note: ob visit [address] [phone]  
  
   
 [phone] 5/19/2017 10:00 AM  
Follow Up with Jose David Cervantes MD  
[facility] Oncology [facility]) Appt Note: 1 month follow up. [address] [facility]  
  
   
 [address] Upcoming Health Maintenance Date Due  
 HPV AGE 9Y-34Y (1 of 3 - Female 3 Dose Series) 9/22/2006 INFLUENZA AGE 9 TO ADULT 8/1/2016 DTaP/Tdap/Td series (1 - Tdap) 9/22/2016 PAP AKA CERVICAL CYTOLOGY 2/9/2020 Allergies as of 4/21/2017  Review Complete On: 4/7/2017 By: Halie Lofton DO Severity Noted Reaction Type Reactions Tramadol  09/19/2016    Itching Current Immunizations  Never Reviewed Name Date Rho(D) Immune Globulin - IM 2/18/2017 12:36 PM  
  
 Not reviewed this visit You Were Diagnosed With   
  
 Codes Comments Thrombocytopenia affecting pregnancy ([facility]    -  Primary ICD-10-CM: O99.119, D69.6 ICD-9-CM: 649.30, 287.5 Rh negative state in antepartum period, second trimester     ICD-10-CM: O09.892 ICD-9-CM: V23.89 Vitals LMP OB Status Smoking Status 11/23/2016 Pregnant Never Smoker Preferred Pharmacy Pharmacy Name Phone [place], [address] [facility] [address] [facility] [phone] Your Updated Medication List  
  
   
 This list is accurate as of: 4/21/17 11:42 AM.  Always use your most recent med list.  
  
  
  
  
 pnv w/o calcium-iron fum-fa 27-1 mg Tab Take  by mouth. Follow-up Instructions Return in about 1 month (around 5/21/2017), or if symptoms worsen or fail to improve. To-Do List   
 04/21/2017 Lab:  CBC WITH AUTOMATED DIFF Introducing [facility] SERVICES! Dear Naif Schumacher: Thank you for requesting a [employer] account. Our records indicate that you already have an active [employer] account. You can access your account anytime at [email or web address] Did you know that you can access your hospital and ER discharge instructions at any time in [employer]? You can also review all of your test results from your hospital stay or ER visit. Additional Information If you have questions, please visit the Frequently Asked Questions section of the [employer] website at [email or web address] Remember, [employer] is NOT to be used for urgent needs. For medical emergencies, dial 911. Now available from your iPhone and Android! Please provide this summary of care documentation to your next provider. Your primary care clinician is listed as NONE. If you have any questions after today's visit, please call [phone].

## 2017-04-21 NOTE — PROGRESS NOTES
[facility] OPIC Progress Note    Date: 2017    Name: Kristofer Dos Santos    MRN: [id number]         : 1995      Ms. Conte was assessed and education was provided. Ms. Conte's vitals were reviewed and patient was observed for 5 minutes prior to treatment. Visit Vitals    /72 (BP 1 Location: Right arm, BP Patient Position: Sitting)    Pulse 80    Temp 98.6 °F (37 °C)    Resp 18     Recent Results (from the past 12 hour(s))   CBC WITH 3 PART DIFF    Collection Time: 17 12:10 PM   Result Value Ref Range    WBC 9.9 4.5 - 13.0 K/uL    RBC 4.08 (L) 4.10 - 5.10 M/uL    HGB 12.4 12.0 - 16.0 g/dL    HCT 36.5 36 - 48 %    MCV 89.5 78 - 102 FL    MCH 30.4 25.0 - 35.0 PG    MCHC 34.0 31 - 37 g/dL    RDW 12.8 11.5 - 14.5 %    NEUTROPHILS 70 40 - 70 %    MIXED CELLS 8 0.1 - 17 %    LYMPHOCYTES 22 14 - 44 %    ABS. NEUTROPHILS 6.9 1.8 - 9.5 K/UL    ABS. MIXED CELLS 0.8 0.0 - 2.3 K/uL    ABS. LYMPHOCYTES 2.2 1.1 - 5.9 K/UL    DF AUTOMATED     PLATELET    Collection Time: 17 12:10 PM   Result Value Ref Range    PLATELET 039 (L) 219 - 420 K/uL       Ms. Conte tolerated the lab draw, and had no complaints. Patient armband removed and shredded. Ms. Omid Xiong was discharged from [place] [address] in stable condition at 1210. She is to follow up with physician for her next appointment.     Praveen Cee RN  2017  12:17 PM

## 2017-05-09 ENCOUNTER — ROUTINE PRENATAL (OUTPATIENT)
Dept: OBGYN CLINIC | Age: 22
End: 2017-05-09

## 2017-05-09 VITALS
SYSTOLIC BLOOD PRESSURE: 106 MMHG | RESPIRATION RATE: 18 BRPM | HEART RATE: 91 BPM | WEIGHT: 207 LBS | HEIGHT: 64 IN | DIASTOLIC BLOOD PRESSURE: 58 MMHG | BODY MASS INDEX: 35.34 KG/M2

## 2017-05-09 DIAGNOSIS — Z34.82 PRENATAL CARE, SUBSEQUENT PREGNANCY, SECOND TRIMESTER: Primary | ICD-10-CM

## 2017-05-09 DIAGNOSIS — Z3A.23 23 WEEKS GESTATION OF PREGNANCY: ICD-10-CM

## 2017-05-09 PROBLEM — O36.5990 IUGR (INTRAUTERINE GROWTH RESTRICTION) AFFECTING CARE OF MOTHER: Status: ACTIVE | Noted: 2017-05-09

## 2017-05-09 NOTE — PROGRESS NOTES
Patient without complaints, good fetal movement. Report from [facility] from referral for IUGR shows fetus at 13%ile, recommends a repeat scan at 28 weeks which the patient says has been scheduled. Note from hematology reviewed. Thrombocytopenia resolved at that visit, patient to follow up prn. Will recheck platelets at next visit. Follow up 4 weeks for 1 hour GCT, CBC, and Rhogam.  Plan of care discussed. Patient expressed understanding.

## 2017-05-19 ENCOUNTER — OFFICE VISIT (OUTPATIENT)
Dept: ONCOLOGY | Age: 22
End: 2017-05-19

## 2017-05-19 ENCOUNTER — HOSPITAL ENCOUNTER (OUTPATIENT)
Dept: INFUSION THERAPY | Age: 22
Discharge: HOME | End: 2017-05-19
Payer: COMMERCIAL

## 2017-05-19 VITALS
SYSTOLIC BLOOD PRESSURE: 112 MMHG | DIASTOLIC BLOOD PRESSURE: 84 MMHG | WEIGHT: 207 LBS | RESPIRATION RATE: 17 BRPM | OXYGEN SATURATION: 100 % | BODY MASS INDEX: 35.34 KG/M2 | HEART RATE: 78 BPM | TEMPERATURE: 97.8 F | HEIGHT: 64 IN

## 2017-05-19 VITALS
RESPIRATION RATE: 17 BRPM | TEMPERATURE: 97.8 F | SYSTOLIC BLOOD PRESSURE: 112 MMHG | HEART RATE: 78 BPM | DIASTOLIC BLOOD PRESSURE: 84 MMHG

## 2017-05-19 DIAGNOSIS — D69.6 THROMBOCYTOPENIA AFFECTING PREGNANCY (HCC): ICD-10-CM

## 2017-05-19 DIAGNOSIS — D69.6 THROMBOCYTOPENIA AFFECTING PREGNANCY (HCC): Primary | ICD-10-CM

## 2017-05-19 DIAGNOSIS — O99.119 THROMBOCYTOPENIA AFFECTING PREGNANCY (HCC): ICD-10-CM

## 2017-05-19 DIAGNOSIS — O99.119 THROMBOCYTOPENIA AFFECTING PREGNANCY (HCC): Primary | ICD-10-CM

## 2017-05-19 LAB
BASO+EOS+MONOS # BLD AUTO: 0.7 K/UL (ref 0–2.3)
BASO+EOS+MONOS # BLD AUTO: 7 % (ref 0.1–17)
DIFFERENTIAL METHOD BLD: ABNORMAL
ERYTHROCYTE [DISTWIDTH] IN BLOOD BY AUTOMATED COUNT: 12.3 % (ref 11.5–14.5)
HCT VFR BLD AUTO: 36.7 % (ref 36–48)
HGB BLD-MCNC: 12.1 G/DL (ref 12–16)
LYMPHOCYTES # BLD AUTO: 23 % (ref 14–44)
LYMPHOCYTES # BLD: 2.4 K/UL (ref 1.1–5.9)
MCH RBC QN AUTO: 29.6 PG (ref 25–35)
MCHC RBC AUTO-ENTMCNC: 33 G/DL (ref 31–37)
MCV RBC AUTO: 89.7 FL (ref 78–102)
NEUTS SEG # BLD: 7.3 K/UL (ref 1.8–9.5)
NEUTS SEG NFR BLD AUTO: 71 % (ref 40–70)
PLATELET # BLD AUTO: 113 K/UL (ref 135–420)
RBC # BLD AUTO: 4.09 M/UL (ref 4.1–5.1)
WBC # BLD AUTO: 10.4 K/UL (ref 4.5–13)

## 2017-05-19 PROCEDURE — 85025 COMPLETE CBC W/AUTO DIFF WBC: CPT | Performed by: INTERNAL MEDICINE

## 2017-05-19 PROCEDURE — 36415 COLL VENOUS BLD VENIPUNCTURE: CPT

## 2017-05-19 PROCEDURE — 85049 AUTOMATED PLATELET COUNT: CPT | Performed by: INTERNAL MEDICINE

## 2017-05-19 NOTE — PROGRESS NOTES
[address] [facility] Progress Note    Date: May 19, 2017    Name: Carine Jennings    MRN: [id number]         : 1995      Ms. Conte was assessed and education was provided. Ms. Conte's vitals were reviewed and patient was observed for 5 minutes prior to treatment. Visit Vitals    /84    Pulse 78    Temp 97.8 °F (36.6 °C)    Resp 17       Lab results were obtained and reviewed. Recent Results (from the past 12 hour(s))   CBC WITH 3 PART DIFF    Collection Time: 17  2:25 PM   Result Value Ref Range    WBC 10.4 4.5 - 13.0 K/uL    RBC 4.09 (L) 4.10 - 5.10 M/uL    HGB 12.1 12.0 - 16.0 g/dL    HCT 36.7 36 - 48 %    MCV 89.7 78 - 102 FL    MCH 29.6 25.0 - 35.0 PG    MCHC 33.0 31 - 37 g/dL    RDW 12.3 11.5 - 14.5 %    NEUTROPHILS 71 (H) 40 - 70 %    MIXED CELLS 7 0.1 - 17 %    LYMPHOCYTES 23 14 - 44 %    ABS. NEUTROPHILS 7.3 1.8 - 9.5 K/UL    ABS. MIXED CELLS 0.7 0.0 - 2.3 K/uL    ABS. LYMPHOCYTES 2.4 1.1 - 5.9 K/UL    DF AUTOMATED     PLATELET    Collection Time: 17  2:25 PM   Result Value Ref Range    PLATELET 442 (L) 506 - 420 K/uL       Venipuncture performed x1 attempt specimen sent to lab for processing. Gauze and coban applied to site. Ms. Jose D Lacy tolerated the lab draw, and had no complaints. Patient armband removed and shredded. Ms. Jose D Lacy was discharged from [place] [address] in stable condition at 1435. She is to follow up with physician for her next appointment.     Debbie Candelaria RN  May 19, 2017  5:20 PM

## 2017-05-19 NOTE — MR AVS SNAPSHOT
Visit Information Date & Time Provider Department Dept. Phone Encounter #  
 5/19/2017  2:00 PM Juanita Holder MD [facility] Oncology [phone] [id number] Your Appointments 6/6/2017  1:30 PM  
OB VISIT with Chris Alarcon DO  
[facility] OB/GYN ([address]) Appt Note: ob and 1 hr gtt [facility] [address] [phone]  
  
   
 [facility] [address] [phone] Upcoming Health Maintenance Date Due  
 HPV AGE 9Y-34Y (1 of 3 - Female 3 Dose Series) 9/22/2006 DTaP/Tdap/Td series (1 - Tdap) 9/22/2016 INFLUENZA AGE 9 TO ADULT 8/1/2017 PAP AKA CERVICAL CYTOLOGY 2/9/2020 Allergies as of 5/19/2017  Review Complete On: 5/9/2017 By: Chris Alarcon DO Severity Noted Reaction Type Reactions Tramadol  09/19/2016    Itching Current Immunizations  Never Reviewed Name Date Rho(D) Immune Globulin - IM 2/18/2017 12:36 PM  
  
 Not reviewed this visit You Were Diagnosed With   
  
 Codes Comments Thrombocytopenia affecting pregnancy ([facility]    -  Primary ICD-10-CM: O99.119, D69.6 ICD-9-CM: 649.30, 287.5 Vitals LMP OB Status Smoking Status 11/23/2016 Pregnant Never Smoker Preferred Pharmacy Pharmacy Name Phone [place], [address] [facility] [address] [facility] [phone] Your Updated Medication List  
  
   
This list is accurate as of: 5/19/17  3:06 PM.  Always use your most recent med list.  
  
  
  
  
 pnv w/o calcium-iron fum-fa 27-1 mg Tab Take  by mouth. To-Do List   
 05/19/2017 Lab:  CBC WITH AUTOMATED DIFF   
  
 06/09/2017 2:00 PM  
  Appointment with [address] [place] [address] 2 at [facility] [address] [place] [address] ([phone]) Introducing [facility] & HEALTH SERVICES! Dear Wiley Vega: Thank you for requesting a [employer] account.   Our records indicate that you already have an active [employer] account. You can access your account anytime at [email or web address] Did you know that you can access your hospital and ER discharge instructions at any time in [employer]? You can also review all of your test results from your hospital stay or ER visit. Additional Information If you have questions, please visit the Frequently Asked Questions section of the [employer] website at [email or web address] Remember, [employer] is NOT to be used for urgent needs. For medical emergencies, dial 911. Now available from your iPhone and Android! Please provide this summary of care documentation to your next provider. Your primary care clinician is listed as NONE. If you have any questions after today's visit, please call [phone].

## 2017-05-19 NOTE — PROGRESS NOTES
Froylan Chowdary is a 24 y.o. female who presents today for follow up    1. Have you been to the ER, urgent care clinic since your last visit? Hospitalized since your last visit? NO    2. Have you seen or consulted any other health care providers outside of the [address] [place] [address] since your last visit? Include any pap smears or colon screening. NO    Health Maintenance reviewed.     Health Maintenance Due   Topic Date Due    HPV AGE 9Y-26Y (1 of 3 - Female 3 Dose Series) 09/22/2006    DTaP/Tdap/Td series (1 - Tdap) 09/22/2016

## 2017-05-19 NOTE — PROGRESS NOTES
[facility] HEMATOLOGY-MEDICAL ONCOLOGY     Patient Active Problem List   Diagnosis Code    [facility] O99.119, D69.6    Rh negative state in antepartum period O09.899    IUGR (intrauterine growth restriction) affecting care of mother O41.80         Assessment/ Plan:     1.) Thrombocytopenia of Pregnancy  -Mild ITP vs. Thrombocytopenia of pregnancy  -Lab workup reviewed  -Currently 26 weeks gestation with her second pregnancy  -Platelet count today 113K  -No previous history of low platelet count, splenomegaly, Hepatitis, or HIV  -RTC. in 3-4 weeks for a repeat CBC or sooner if necessary    2.) Previous miscarriage  -APL-Antibodies and Lupus anticoag. Assay were negative    Thank you for the opportunity to participate in the care, please do not hesitate to call for any questions or concerns. I have discussed the diagnosis with the patient and the intended plan. The patient verbalized understanding and agrees with the plan. History:   Lanny Delgado is a 24 y.o. female returning today for: Thrombocytopenia of Pregnancy  -Mild ITP vs. Thrombocytopenia of pregnancy  -No previous history of low platelet count, splenomegaly, Hepatitis, or HIV      Current Outpatient Prescriptions   Medication Sig Dispense Refill    pnv w/o calcium-iron fum-fa 27-1 mg tab Take  by mouth.          Objective:   VS:    Visit Vitals    LMP 11/23/2016        Physical Exam:     Constitutional:  well developed, well nourished, no acute distress and alert and oriented x 3    HENT:  Oral mucosa pink and moist, no cyanosis observed and no pallor observed.    Eyes:  conjunctiva normal, upper and lower eyelid normal bilaterally.    Neck:  No jugular venous distension present.    Cardiovascular:  heart sounds normal, normal rate and regular rhythm   Pulmonary/Chest Wall:  breath sounds are clear bilaterally and no use of accessory muscles in breathing.  Abdominal:  Non tender, no palpable masses, no hepatosplenomegaly, and no abdominal bruits. fundal ht. 26 wks. Musculoskeletal:  No digital clubbing or cyanosis. Normal muscle strength and tone.    Skin:  Normal and no rashes or lesions    Peripheral Vascular:  No edema present in extremities. Femoral pulse normal bilaterally. Dorsalis pedis pulse normal bilaterally. Review of Systems  Constitutional:  Fever: Negative, Chills: Negative, Weight Loss: Negative, Malaise/Fatigue: Negative  Diaphoresis: Negative, Weakness: Negative  Skin:  Rash: Negative, Itching: Negative  HENT:  Headache:Negative, Hearing Loss: Negative, Tinnitus: Negative, Ear Pain: Negative  Nosebleeds: Negative, Congestion: Negative, Stridor: Negative,  Sore Throat: Negative  Eyes:   Blurred Vision: Negative, Double Vision: Negative, Photophobia: Negative  Eye Pain: Negative, Eye Discharge: Negative, Eye Redness: Negative  Cardiovascular:   Chest Pain: Negative, Palpitations: Negative, Orthopnea: Negative  Claudication: Negative, Leg Swelling: Negative PND: Negative  Respiratory:  Cough: Negative, Hemoptysis: Negative, Sputum Production: Negative  Shortness of Breath: Negative, Wheezing: Negative  Gastrointestinal:  Heartburn: Negative, Nausea: Negative, Vomiting: Negative  Abdominal Pain: Negative, Diarrhea: Negative, Constipation: Negative  Blood in Stool: Negative, Melena: Negative   Genitourinary:   Dysuria: Negative, Urgency: Negative, Frequency: Negative  Hematuria: Negative, Flank Pain: Negative  Musculoskeletal:   Myalgias: Negative, Neck Pain: Negative, Back Pain: Negative    Joint Pain: Negative, Falls: Negative  Endo/Heme/Allergies:   Easy Bruise/Blood: Negative, Env. Allergies: Negative, Polydipsia: Negative   Neurological:   Dizziness: Negative, Tingling: Negative. Tremor: Negative,    Sensory Change: Negative.  Speech Change: Negative, Focal Weakness: Negative Seizures: Negative, LOC: Negative  Psychiatric:  Depression: Negative, Suicidal Ideas: Negative, Substance Abuse: Negative  Hallucinations: Negative, Nervous/Anxious: Negative  Insomnia: Negative, Memory Loss: Negative     No results found for this or any previous visit (from the past 168 hour(s)). Past Medical History:   Diagnosis Date    Anxiety     Depression        Past Surgical History:   Procedure Laterality Date    HX HERNIA REPAIR  childhood       Social History     Social History    Marital status: SINGLE     Spouse name: N/A    Number of children: N/A    Years of education: N/A     Occupational History    Not on file.      Social History Main Topics    Smoking status: Never Smoker    Smokeless tobacco: Never Used    Alcohol use No    Drug use: No    Sexual activity: Yes     Partners: Male     Birth control/ protection: None     Other Topics Concern    Not on file     Social History Narrative       Family History   Problem Relation Age of Onset    No Known Problems Mother     Diabetes Father     No Known Problems Maternal Grandmother     No Known Problems Maternal Grandfather     No Known Problems Paternal Grandmother     No Known Problems Paternal Grandfather        Allergies   Allergen Reactions    Tramadol Itching       Follow-up Disposition: Not on File    Camacho Orta MD  Hematology-Medical Oncology

## 2017-06-06 ENCOUNTER — HOSPITAL ENCOUNTER (OUTPATIENT)
Dept: LAB | Age: 22
Discharge: HOME | End: 2017-06-06

## 2017-06-06 ENCOUNTER — ROUTINE PRENATAL (OUTPATIENT)
Dept: OBGYN CLINIC | Age: 22
End: 2017-06-06

## 2017-06-06 VITALS
RESPIRATION RATE: 18 BRPM | HEIGHT: 64 IN | DIASTOLIC BLOOD PRESSURE: 81 MMHG | HEART RATE: 97 BPM | BODY MASS INDEX: 36.02 KG/M2 | WEIGHT: 211 LBS | SYSTOLIC BLOOD PRESSURE: 140 MMHG

## 2017-06-06 DIAGNOSIS — D69.6 THROMBOCYTOPENIA AFFECTING PREGNANCY (HCC): ICD-10-CM

## 2017-06-06 DIAGNOSIS — O99.119 THROMBOCYTOPENIA AFFECTING PREGNANCY (HCC): ICD-10-CM

## 2017-06-06 DIAGNOSIS — Z34.83 PRENATAL CARE, SUBSEQUENT PREGNANCY, THIRD TRIMESTER: Primary | ICD-10-CM

## 2017-06-06 DIAGNOSIS — Z3A.27 27 WEEKS GESTATION OF PREGNANCY: ICD-10-CM

## 2017-06-06 PROCEDURE — 99001 SPECIMEN HANDLING PT-LAB: CPT | Performed by: OBSTETRICS & GYNECOLOGY

## 2017-06-06 NOTE — MR AVS SNAPSHOT
Visit Information Date & Time Provider Department Dept. Phone Encounter #  
 6/6/2017  1:30 PM Mahogany Castro, [address] [facility] [phone] [id number] Follow-up Instructions Return in about 2 weeks (around 6/20/2017). Your Appointments 6/9/2017 11:00 AM  
Follow Up with Mario Urias MD  
[address] [facility] Oncology [place] [facility]) Appt Note: 3 week follow up with labs first  
 [address] [facility] [address] [facility]  
  
   
 [address] [facility] [address] [facility] 951 Upcoming Health Maintenance Date Due  
 HPV AGE 9Y-34Y (1 of 3 - Female 3 Dose Series) 9/22/2006 INFLUENZA AGE 9 TO ADULT 8/1/2017 PAP AKA CERVICAL CYTOLOGY 2/9/2020 Allergies as of 6/6/2017  Review Complete On: 6/6/2017 By: Mahogany Castro DO Severity Noted Reaction Type Reactions Tramadol  09/19/2016    Itching Current Immunizations  Never Reviewed Name Date Rho(D) Immune Globulin - IM 2/18/2017 12:36 PM  
  
 Not reviewed this visit You Were Diagnosed With   
  
 Codes Comments Prenatal care, subsequent pregnancy, third trimester    -  Primary ICD-10-CM: Z34.83 ICD-9-CM: V22.1 27 weeks gestation of pregnancy     ICD-10-CM: Z3A.27 
ICD-9-CM: V22.2 Thrombocytopenia affecting pregnancy ([facility]     ICD-10-CM: O99.119, D69.6 ICD-9-CM: 649.30, 287.5 Vitals BP Pulse Resp Height(growth percentile) Weight(growth percentile) LMP  
 140/81 97 18 5' 4\" (1.626 m) 211 lb (95.7 kg) 11/23/2016 BMI OB Status Smoking Status 36.22 kg/m2 Pregnant Never Smoker Vitals History BMI and BSA Data Body Mass Index Body Surface Area  
 36.22 kg/m 2 2.08 m 2 Preferred Pharmacy Pharmacy Name Phone [place], [address] [facility] [address] [facility] [phone] Your Updated Medication List  
  
   
 This list is accurate as of: 6/6/17  3:00 PM.  Always use your most recent med list.  
  
  
  
  
 pnv w/o calcium-iron fum-fa 27-1 mg Tab Take  by mouth. We Performed the Following RHO D IMMUNE GLOBULIN INJ [id number] [facility]] Follow-up Instructions Return in about 2 weeks (around 6/20/2017). To-Do List   
 06/09/2017 2:00 PM  
  Appointment with [address] [place] [address] 2 at [facility] [place] [facility] ([phone]) Introducing [facility] & [facility] SERVICES! Dear Helga Dinero: Thank you for requesting a [address] account. Our records indicate that you already have an active [address] account. You can access your account anytime at [email or web address] Did you know that you can access your hospital and ER discharge instructions at any time in [address]? You can also review all of your test results from your hospital stay or ER visit. Additional Information If you have questions, please visit the Frequently Asked Questions section of the [facility] website at [email or web address] Remember, [address] is NOT to be used for urgent needs. For medical emergencies, dial 911. Now available from your iPhone and Android! Please provide this summary of care documentation to your next provider. Your primary care clinician is listed as NONE. If you have any questions after today's visit, please call [phone].

## 2017-06-06 NOTE — PROGRESS NOTES
Pt ltoerated TDaP IM injection to rt deltoid without any problem. VIS information given to patient. RhoGAM IM injection to rt buttocks given to patient without any distress after labs drawn.

## 2017-06-06 NOTE — PROGRESS NOTES
Patient without complaints, good fetal movement. Repeat US at [facility] scheduled for 6/12.  1 hour GCT, Rhogam, and TDaP today. Repeat platelet drawn. Follow up 2 weeks on rotation. Plan of care discussed. Patient expressed understanding.

## 2017-06-07 LAB
BASOPHILS # BLD AUTO: 0 X10E3/UL (ref 0–0.2)
BASOPHILS NFR BLD AUTO: 0 %
BLD GP AB SCN SERPL QL: NEGATIVE
EOSINOPHIL # BLD AUTO: 0.2 X10E3/UL (ref 0–0.4)
EOSINOPHIL NFR BLD AUTO: 2 %
ERYTHROCYTE [DISTWIDTH] IN BLOOD BY AUTOMATED COUNT: 13.5 % (ref 12.3–15.4)
HCT VFR BLD AUTO: 38.5 % (ref 34–46.6)
HGB BLD-MCNC: 12.6 G/DL (ref 11.1–15.9)
IMM GRANULOCYTES # BLD: 0.1 X10E3/UL (ref 0–0.1)
IMM GRANULOCYTES NFR BLD: 1 %
LYMPHOCYTES # BLD AUTO: 2.6 X10E3/UL (ref 0.7–3.1)
LYMPHOCYTES NFR BLD AUTO: 24 %
MCH RBC QN AUTO: 29.6 PG (ref 26.6–33)
MCHC RBC AUTO-ENTMCNC: 32.7 G/DL (ref 31.5–35.7)
MCV RBC AUTO: 91 FL (ref 79–97)
MONOCYTES # BLD AUTO: 0.6 X10E3/UL (ref 0.1–0.9)
MONOCYTES NFR BLD AUTO: 5 %
NEUTROPHILS # BLD AUTO: 7.2 X10E3/UL (ref 1.4–7)
NEUTROPHILS NFR BLD AUTO: 68 %
PLATELET # BLD AUTO: 132 X10E3/UL (ref 150–379)
RBC # BLD AUTO: 4.25 X10E6/UL (ref 3.77–5.28)
WBC # BLD AUTO: 10.8 X10E3/UL (ref 3.4–10.8)

## 2017-06-08 LAB
GLUCOSE 1H P 50 G GLC PO SERPL-MCNC: 80 MG/DL (ref 65–139)
SPECIMEN STATUS REPORT, ROLRST: NORMAL

## 2017-06-09 ENCOUNTER — ROUTINE PRENATAL (OUTPATIENT)
Dept: OBGYN CLINIC | Age: 22
End: 2017-06-09

## 2017-06-09 ENCOUNTER — OFFICE VISIT (OUTPATIENT)
Dept: ONCOLOGY | Age: 22
End: 2017-06-09

## 2017-06-09 ENCOUNTER — HOSPITAL ENCOUNTER (OUTPATIENT)
Dept: INFUSION THERAPY | Age: 22
Discharge: HOME | End: 2017-06-09
Payer: MEDICAID

## 2017-06-09 VITALS
WEIGHT: 211 LBS | SYSTOLIC BLOOD PRESSURE: 125 MMHG | HEART RATE: 80 BPM | OXYGEN SATURATION: 98 % | TEMPERATURE: 98.4 F | HEIGHT: 64 IN | DIASTOLIC BLOOD PRESSURE: 72 MMHG | RESPIRATION RATE: 17 BRPM | BODY MASS INDEX: 36.02 KG/M2

## 2017-06-09 VITALS
WEIGHT: 211 LBS | RESPIRATION RATE: 18 BRPM | HEART RATE: 89 BPM | DIASTOLIC BLOOD PRESSURE: 67 MMHG | BODY MASS INDEX: 36.02 KG/M2 | SYSTOLIC BLOOD PRESSURE: 126 MMHG | HEIGHT: 64 IN

## 2017-06-09 VITALS
RESPIRATION RATE: 17 BRPM | SYSTOLIC BLOOD PRESSURE: 125 MMHG | HEART RATE: 80 BPM | TEMPERATURE: 98.4 F | DIASTOLIC BLOOD PRESSURE: 72 MMHG | OXYGEN SATURATION: 98 %

## 2017-06-09 DIAGNOSIS — D69.6 THROMBOCYTOPENIA AFFECTING PREGNANCY (HCC): Primary | ICD-10-CM

## 2017-06-09 DIAGNOSIS — O99.119 THROMBOCYTOPENIA AFFECTING PREGNANCY (HCC): Primary | ICD-10-CM

## 2017-06-09 DIAGNOSIS — L29.2 VULVOVAGINAL ITCHING: Primary | ICD-10-CM

## 2017-06-09 LAB
BASO+EOS+MONOS # BLD AUTO: 0.6 K/UL (ref 0–2.3)
BASO+EOS+MONOS # BLD AUTO: 6 % (ref 0.1–17)
DIFFERENTIAL METHOD BLD: ABNORMAL
ERYTHROCYTE [DISTWIDTH] IN BLOOD BY AUTOMATED COUNT: 12.4 % (ref 11.5–14.5)
HCT VFR BLD AUTO: 38.1 % (ref 36–48)
HGB BLD-MCNC: 12.5 G/DL (ref 12–16)
KOH PREP SKIN, KOHPOCT: NORMAL
LYMPHOCYTES # BLD AUTO: 20 % (ref 14–44)
LYMPHOCYTES # BLD: 2 K/UL (ref 1.1–5.9)
MCH RBC QN AUTO: 29.6 PG (ref 25–35)
MCHC RBC AUTO-ENTMCNC: 32.8 G/DL (ref 31–37)
MCV RBC AUTO: 90.1 FL (ref 78–102)
NEUTS SEG # BLD: 7.7 K/UL (ref 1.8–9.5)
NEUTS SEG NFR BLD AUTO: 74 % (ref 40–70)
PLATELET # BLD AUTO: 119 K/UL (ref 135–420)
RBC # BLD AUTO: 4.23 M/UL (ref 4.1–5.1)
WBC # BLD AUTO: 10.3 K/UL (ref 4.5–13)

## 2017-06-09 PROCEDURE — 85025 COMPLETE CBC W/AUTO DIFF WBC: CPT | Performed by: INTERNAL MEDICINE

## 2017-06-09 PROCEDURE — 85049 AUTOMATED PLATELET COUNT: CPT | Performed by: INTERNAL MEDICINE

## 2017-06-09 PROCEDURE — 36415 COLL VENOUS BLD VENIPUNCTURE: CPT

## 2017-06-09 RX ORDER — TERCONAZOLE 8 MG/G
1 CREAM VAGINAL
Qty: 20 G | Refills: 0 | Status: SHIPPED | OUTPATIENT
Start: 2017-06-09 | End: 2017-07-10

## 2017-06-09 NOTE — PROGRESS NOTES
[facility] HEMATOLOGY-MEDICAL ONCOLOGY     Patient Active Problem List   Diagnosis Code    [facility] O99.119, D69.6    Rh negative state in antepartum period O09.899    IUGR (intrauterine growth restriction) affecting care of mother O41.80         Assessment/ Plan:     1.) Thrombocytopenia of Pregnancy    -Currently 28 weeks gestation with her second pregnancy  -Platelet count today 119K  -No previous history of low platelet count, splenomegaly, Hepatitis, or HIV  -RTC. in 4 weeks for a repeat CBC or sooner if necessary    2.) Previous miscarriage  -APL-Antibodies and Lupus anticoag. Assay were negative    Thank you for the opportunity to participate in the care, please do not hesitate to call for any questions or concerns. I have discussed the diagnosis with the patient and the intended plan. The patient verbalized understanding and agrees with the plan. History:   Froylan Chowdary is a 24 y.o. female returning today for: Thrombocytopenia of Pregnancy  -Mild ITP vs. Thrombocytopenia of pregnancy  -No previous history of low platelet count, splenomegaly, Hepatitis, or HIV      Current Outpatient Prescriptions   Medication Sig Dispense Refill    terconazole (TERAZOL 3) 0.8 % vaginal cream Insert 1 Applicator into vagina nightly. 20 g 0    pnv w/o calcium-iron fum-fa 27-1 mg tab Take  by mouth.          Objective:   VS:    Visit Vitals    LMP 11/23/2016        Physical Exam:     Constitutional:  well developed, well nourished, no acute distress and alert and oriented x 3    HENT:  Oral mucosa pink and moist, no cyanosis observed and no pallor observed.    Eyes:  conjunctiva normal, upper and lower eyelid normal bilaterally.    Neck:  No jugular venous distension present.    Cardiovascular:  heart sounds normal, normal rate and regular rhythm   Pulmonary/Chest Wall:  breath sounds are clear bilaterally   Abdominal:  Non tender, no palpable masses, no hepatosplenomegaly, and no abdominal bruits. fundal ht.  28 wks. Musculoskeletal:  No digital clubbing or cyanosis. Normal muscle strength and tone.    Skin:  Normal and no rashes or lesions    Peripheral Vascular:  Dorsalis pedis pulse normal bilaterally. Review of Systems  Constitutional:  Fever: Negative, Chills: Negative, Weight Loss: Negative, Malaise/Fatigue: Negative  Diaphoresis: Negative, Weakness: Negative  Skin:  Rash: Negative, Itching: Negative  HENT:  Headache:Negative, Hearing Loss: Negative, Tinnitus: Negative, Ear Pain: Negative  Nosebleeds: Negative, Congestion: Negative, Stridor: Negative,  Sore Throat: Negative  Eyes:   Blurred Vision: Negative, Double Vision: Negative, Photophobia: Negative  Eye Pain: Negative, Eye Discharge: Negative, Eye Redness: Negative  Cardiovascular:   Chest Pain: Negative, Palpitations: Negative, Orthopnea: Negative  Claudication: Negative, Leg Swelling: Negative PND: Negative  Respiratory:  Cough: Negative, Hemoptysis: Negative, Sputum Production: Negative  Shortness of Breath: Negative, Wheezing: Negative  Gastrointestinal:  Heartburn: Negative, Nausea: Negative, Vomiting: Negative  Abdominal Pain: Negative, Diarrhea: Negative, Constipation: Negative  Blood in Stool: Negative, Melena: Negative   Genitourinary:   Dysuria: Negative, Urgency: Negative, Frequency: Negative  Hematuria: Negative, Flank Pain: Negative  Musculoskeletal:   Myalgias: Negative, Neck Pain: Negative, Back Pain: Negative    Joint Pain: Negative, Falls: Negative  Endo/Heme/Allergies:   Easy Bruise/Blood: Negative, Env. Allergies: Negative, Polydipsia: Negative   Neurological:   Dizziness: Negative, Tingling: Negative. Tremor: Negative,    Sensory Change: Negative.  Speech Change: Negative, Focal Weakness: Negative Seizures: Negative, LOC: Negative  Psychiatric:  Depression: Negative, Suicidal Ideas: Negative, Substance Abuse: Negative  Hallucinations: Negative, Nervous/Anxious: Negative  Insomnia: Negative, Memory Loss: Negative     Recent Results (from the past 168 hour(s))   CBC WITH AUTOMATED DIFF    Collection Time: 06/06/17  2:40 AM   Result Value Ref Range    WBC 10.8 3.4 - 10.8 x10E3/uL    RBC 4.25 3.77 - 5.28 x10E6/uL    HGB 12.6 11.1 - 15.9 g/dL    HCT 38.5 34.0 - 46.6 %    MCV 91 79 - 97 fL    MCH 29.6 26.6 - 33.0 pg    MCHC 32.7 31.5 - 35.7 g/dL    RDW 13.5 12.3 - 15.4 %    PLATELET 543 (L) 519 - 379 x10E3/uL    NEUTROPHILS 68 %    Lymphocytes 24 %    MONOCYTES 5 %    EOSINOPHILS 2 %    BASOPHILS 0 %    ABS. NEUTROPHILS 7.2 (H) 1.4 - 7.0 x10E3/uL    Abs Lymphocytes 2.6 0.7 - 3.1 x10E3/uL    ABS. MONOCYTES 0.6 0.1 - 0.9 x10E3/uL    ABS. EOSINOPHILS 0.2 0.0 - 0.4 x10E3/uL    ABS. BASOPHILS 0.0 0.0 - 0.2 x10E3/uL    IMMATURE GRANULOCYTES 1 %    ABS. IMM. GRANS. 0.1 0.0 - 0.1 x10E3/uL   ANTIBODY SCREEN    Collection Time: 06/06/17  2:40 AM   Result Value Ref Range    Antibody screen Negative Negative   GLUCOSE, GESTATIONAL, 1 HR TOLERANCE    Collection Time: 06/06/17  2:40 AM   Result Value Ref Range    Gestational Diabetes Screen 80 65 - 139 mg/dL   SPECIMEN STATUS REPORT    Collection Time: 06/06/17  2:40 AM   Result Value Ref Range    SPECIMEN STATUS REPORT COMMENT    AMB POC TISSUE EXAM BY KOH SLIDE OF SAMPLES FROM SKIN    Collection Time: 06/09/17 11:02 AM   Result Value Ref Range    KOH prep, skin (POC)         Past Medical History:   Diagnosis Date    Anxiety     Depression        Past Surgical History:   Procedure Laterality Date    HX HERNIA REPAIR  childhood       Social History     Social History    Marital status: SINGLE     Spouse name: N/A    Number of children: N/A    Years of education: N/A     Occupational History    Not on file.      Social History Main Topics    Smoking status: Never Smoker    Smokeless tobacco: Never Used    Alcohol use No    Drug use: No    Sexual activity: Yes     Partners: Male     Birth control/ protection: None     Other Topics Concern    Not on file     Social History Narrative       Family History   Problem Relation Age of Onset    No Known Problems Mother     Diabetes Father     No Known Problems Maternal Grandmother     No Known Problems Maternal Grandfather     No Known Problems Paternal Grandmother     No Known Problems Paternal Grandfather        Allergies   Allergen Reactions    Tramadol Itching       Follow-up Disposition: Not on File    Amber Tellez MD  Hematology-Medical Oncology

## 2017-06-09 NOTE — MR AVS SNAPSHOT
Visit Information Date & Time Provider Department Dept. Phone Encounter #  
 6/9/2017 11:00 AM Oscar Chopra MD [facility] [id number] Follow-up Instructions Return in about 3 weeks (around 6/30/2017), or if symptoms worsen or fail to improve. Your Appointments 6/20/2017  9:45 AM  
OB VISIT with Miguel Alicea DO  
[facility][1] OB/GYN ([facility]) Appt Note: ob visit [address] [facility] [phone]  
[phone]  
  
   
 [address] [facility] [phone]  
  
    
 7/7/2017 10:00 AM  
Follow Up with Oscar Chopra MD  
[facility] Oncology [facility]) Appt Note: f-up visit [address] [facility]  
  
   
 [address] [facility] [address] [facility] 951 Upcoming Health Maintenance Date Due  
 HPV AGE 9Y-34Y (1 of 3 - Female 3 Dose Series) 9/22/2006 INFLUENZA AGE 9 TO ADULT 8/1/2017 PAP AKA CERVICAL CYTOLOGY 2/9/2020 DTaP/Tdap/Td series (2 - Td) 6/6/2027 Allergies as of 6/9/2017  Review Complete On: 6/9/2017 By: Breanna Perez DO Severity Noted Reaction Type Reactions Tramadol  09/19/2016    Itching Current Immunizations  Never Reviewed Name Date Rho(D) Immune Globulin - IM 2/18/2017 12:36 PM  
 Tdap 6/6/2017 Not reviewed this visit You Were Diagnosed With   
  
 Codes Comments Thrombocytopenia affecting pregnancy ([facility]    -  Primary ICD-10-CM: O99.119, D69.6 ICD-9-CM: 649.30, 287.5 Vitals BP Pulse Temp Resp Height(growth percentile) Weight(growth percentile) 125/72 (BP 1 Location: Right arm, BP Patient Position: Sitting) 80 98.4 °F (36.9 °C) (Oral) 17 5' 4\" (1.626 m) 211 lb (95.7 kg) LMP SpO2 BMI OB Status Smoking Status 11/23/2016 98% 36.22 kg/m2 Pregnant Never Smoker BMI and BSA Data  Body Mass Index Body Surface Area  
 36.22 kg/m 2 2.08 m 2  
  
  
 Preferred Pharmacy Pharmacy Name Phone [place], [address] [phone] Your Updated Medication List  
  
   
This list is accurate as of: 6/9/17 12:42 PM.  Always use your most recent med list.  
  
  
  
  
 pnv w/o calcium-iron fum-fa 27-1 mg Tab Take  by mouth. terconazole 0.8 % vaginal cream  
Commonly known as:  TERAZOL 3 Insert 1 Applicator into vagina nightly. Follow-up Instructions Return in about 3 weeks (around 6/30/2017), or if symptoms worsen or fail to improve. Introducing [facility] & [facility] SERVICES! Dear Antonio Chao: Thank you for requesting a [employer] account. Our records indicate that you already have an active [employer] account. You can access your account anytime at [email or web address] Did you know that you can access your hospital and ER discharge instructions at any time in [employer]? You can also review all of your test results from your hospital stay or ER visit. Additional Information If you have questions, please visit the Frequently Asked Questions section of the [employer] website at [email or web address] Remember, [employer] is NOT to be used for urgent needs. For medical emergencies, dial 911. Now available from your iPhone and Android! Please provide this summary of care documentation to your next provider. Your primary care clinician is listed as NONE. If you have any questions after today's visit, please call [phone].

## 2017-06-09 NOTE — PATIENT INSTRUCTIONS

## 2017-06-09 NOTE — PROGRESS NOTES
Tala Sutton is a 24 y.o. female who presents today for follow up. Pt denies any pain or discomfort. 1. Have you been to the ER, urgent care clinic since your last visit? Hospitalized since your last visit? NO    2. Have you seen or consulted any other health care providers outside of the [address] [place] [address] since your last visit? Include any pap smears or colon screening. NO    Health Maintenance reviewed.     Health Maintenance Due   Topic Date Due    HPV AGE 9Y-34Y (1 of 3 - Female 3 Dose Series) 09/22/2006

## 2017-06-09 NOTE — PROGRESS NOTES
[facility] OP[facility] Progress Note    Date: 2017    Name: Susana Leary    MRN: [id number]         : 1995    Peripheral Lab Draw      Ms. Conte to [facility], ambulatory at 1120. Pt was assessed and education was provided. Ms. Conte's vitals were reviewed and patient was observed for 5 minutes prior to treatment. Visit Vitals    /72 (BP 1 Location: Right arm, BP Patient Position: Sitting)    Pulse 80    Temp 98.4 °F (36.9 °C)    Resp 17    SpO2 98%     Recent Results (from the past 12 hour(s))   AMB POC TISSUE EXAM BY KOH SLIDE OF SAMPLES FROM SKIN    Collection Time: 17 11:02 AM   Result Value Ref Range    KOH prep, skin (POC)     CBC WITH 3 PART DIFF    Collection Time: 17 11:25 AM   Result Value Ref Range    WBC 10.3 4.5 - 13.0 K/uL    RBC 4.23 4. 10 - 5.10 M/uL    HGB 12.5 12.0 - 16.0 g/dL    HCT 38.1 36 - 48 %    MCV 90.1 78 - 102 FL    MCH 29.6 25.0 - 35.0 PG    MCHC 32.8 31 - 37 g/dL    RDW 12.4 11.5 - 14.5 %    NEUTROPHILS 74 (H) 40 - 70 %    MIXED CELLS 6 0.1 - 17 %    LYMPHOCYTES 20 14 - 44 %    ABS. NEUTROPHILS 7.7 1.8 - 9.5 K/UL    ABS. MIXED CELLS 0.6 0.0 - 2.3 K/uL    ABS. LYMPHOCYTES 2.0 1.1 - 5.9 K/UL    DF AUTOMATED     PLATELET    Collection Time: 17 11:25 AM   Result Value Ref Range    PLATELET 976 (L) 264 - 420 K/uL       Blood obtained peripherally from LAC x 1 attempt with butterfly needle and sent to lab for CBC per written orders. No bleeding or hematoma noted at site. Guaze and coban applied. Ms. Celestine Aase tolerated the phlebotomy, and had no complaints. Patient armband removed and shredded. Ms. Celestine Aase was discharged from [place] [address] in stable condition at 1135. She is to return for follow up with physician.     Kendra Ann RN  2017

## 2017-06-09 NOTE — PROGRESS NOTES
Patient presents to the office for a sick visit, c/o of vulvovaginal itching associated with thick white/cream discharge since 2 days ago. No odor. No pelvic pain. Seen for [facility] recently. She denies obstetric complaints. ROS:  Pertinent findings as above. Past Medical History:   Diagnosis Date    Anxiety     Depression          Visit Vitals    /67    Pulse 89    Resp 18    Ht 5' 4\" (1.626 m)    Wt 211 lb (95.7 kg)    LMP 11/23/2016    BMI 36.22 kg/m2       SEE PRENATAL VITAL  Gen:  NAD  Pelvic. Normal external genitalia without erythema or excoriation, vaginal with moderate thick white/cream discharge. Normal cervix. Bimanual deferred. KOH +many hyphae. A/P      ICD-10-CM ICD-9-CM    1. Vulvovaginal itching L29.2 698.1 AMB POC TISSUE EXAM BY KOH SLIDE OF SAMPLES FROM SKIN      terconazole (TERAZOL 3) 0.8 % vaginal cream     Keep existing PNC visit. Plan of care discussed. Patient expressed understanding and agreement.

## 2017-07-07 ENCOUNTER — ROUTINE PRENATAL (OUTPATIENT)
Dept: OBGYN CLINIC | Age: 22
End: 2017-07-07

## 2017-07-07 ENCOUNTER — OFFICE VISIT (OUTPATIENT)
Dept: ONCOLOGY | Age: 22
End: 2017-07-07

## 2017-07-07 ENCOUNTER — HOSPITAL ENCOUNTER (OUTPATIENT)
Age: 22
Setting detail: OBSERVATION
Discharge: HOME | End: 2017-07-07
Attending: OBSTETRICS & GYNECOLOGY | Admitting: OBSTETRICS & GYNECOLOGY
Payer: MEDICAID

## 2017-07-07 VITALS
HEART RATE: 90 BPM | HEIGHT: 64 IN | BODY MASS INDEX: 37.46 KG/M2 | SYSTOLIC BLOOD PRESSURE: 123 MMHG | DIASTOLIC BLOOD PRESSURE: 75 MMHG | WEIGHT: 219.4 LBS

## 2017-07-07 VITALS
DIASTOLIC BLOOD PRESSURE: 80 MMHG | RESPIRATION RATE: 17 BRPM | HEIGHT: 64 IN | WEIGHT: 215 LBS | TEMPERATURE: 98.7 F | HEART RATE: 77 BPM | BODY MASS INDEX: 36.7 KG/M2 | SYSTOLIC BLOOD PRESSURE: 139 MMHG | OXYGEN SATURATION: 100 %

## 2017-07-07 DIAGNOSIS — O99.119 THROMBOCYTOPENIA AFFECTING PREGNANCY (HCC): Primary | ICD-10-CM

## 2017-07-07 DIAGNOSIS — Z34.83 PRENATAL CARE, SUBSEQUENT PREGNANCY, THIRD TRIMESTER: Primary | ICD-10-CM

## 2017-07-07 DIAGNOSIS — O26.892 RH NEGATIVE STATE IN ANTEPARTUM PERIOD, SECOND TRIMESTER: ICD-10-CM

## 2017-07-07 DIAGNOSIS — D69.6 THROMBOCYTOPENIA AFFECTING PREGNANCY (HCC): Primary | ICD-10-CM

## 2017-07-07 DIAGNOSIS — Z67.91 RH NEGATIVE STATE IN ANTEPARTUM PERIOD, SECOND TRIMESTER: ICD-10-CM

## 2017-07-07 DIAGNOSIS — Z3A.32 32 WEEKS GESTATION OF PREGNANCY: ICD-10-CM

## 2017-07-07 PROBLEM — Z34.90 PREGNANCY: Status: ACTIVE | Noted: 2017-07-07

## 2017-07-07 PROCEDURE — 59025 FETAL NON-STRESS TEST: CPT

## 2017-07-07 PROCEDURE — 99283 EMERGENCY DEPT VISIT LOW MDM: CPT

## 2017-07-07 PROCEDURE — 99218 HC RM OBSERVATION: CPT

## 2017-07-07 NOTE — IP AVS SNAPSHOT
[address] Patient: Richardson Kline MRN: [id number] :1995 You are allergic to the following Allergen Reactions Tramadol Itching Recent Documentation OB Status Smoking Status Pregnant Never Smoker Emergency Contacts Name Discharge Info Relation Home Work Mobile Alexei Page CAREGIVER [3] Mother [14] [phone] About your hospitalization You were admitted on:  N/A You last received care in the:  [address] [place] [address] You were discharged on:  2017 Unit phone number:  [phone] Why you were hospitalized Your primary diagnosis was:  Not on File Your diagnoses also included:  Pregnancy Providers Seen During Your Hospitalizations Provider Role Specialty Primary office phone Lorenzo Avendano MD Attending Provider Obstetrics & Gynecology [phone] Your Primary Care Physician (PCP) Primary Care Physician Office Phone Office Fax NONE ** None ** ** None ** Follow-up Information Follow up With Details Comments Contact Info None   None (395) Patient stated that they have no PCP Your Appointments 2017  9:45 AM EDT Office Visit with Lorenzo Avendano MD  
[facility] ([facility] [address] [id number] [phone]   1:30 PM EDT Follow Up with Kaveh Kaiser MD  
[facility] [address] [phone]  
[phone] Current Discharge Medication List  
  
ASK your doctor about these medications Dose & Instructions Dispensing Information Comments Morning Noon Evening Bedtime  
 pnv w/o calcium-iron fum-fa 27-1 mg Tab Your last dose was: Your next dose is: Take  by mouth. Refills:  0  
     
   
   
   
  
 terconazole 0.8 % vaginal cream  
Commonly known as:  TERAZOL 3 Your last dose was: Your next dose is:    
   
   
 Dose:  1 Applicator Insert 1 Applicator into vagina nightly. Quantity:  20 g Refills:  0 Discharge Instructions Follow up as scheduled Discharge Instructions Attachments/References PREGNANCY: WEEKS 32 TO 34 (ENGLISH) PREGNANCY: KICK COUNTS (ENGLISH) Discharge Orders None Introducing [facility]! Dear Fernando Ortiz: Thank you for requesting a [employer] account. Our records indicate that you already have an active [employer] Newsletter account. You can access your account anytime at [email or web address] Did you know that you can access your hospital and ER discharge instructions at any time in [employer]? You can also review all of your test results from your hospital stay or ER visit. Additional Information If you have questions, please visit the Frequently Asked Questions section of the [employer] website at [email or web address] Remember, [employer]letter is NOT to be used for urgent needs. For medical emergencies, dial 911. Now available from your iPhone and Android! General Information Please provide this summary of care documentation to your next provider. Patient Signature:  ____________________________________________________________ Date:  ____________________________________________________________  
  
Claudene Born Provider Signature:  ____________________________________________________________ Date:  ____________________________________________________________ More Information Weeks 32 to 34 of Your Pregnancy: Care Instructions Your Care Instructions During the last few weeks of your pregnancy, you may have more aches and pains. It's important to rest when you can. Your growing baby is putting more pressure on your bladder. So you may need to urinate more often. Hemorrhoids are also common. These are painful, itchy veins in the rectal area. In the 36th week, most women have a test for group B streptococcus (GBS). GBS is a common bacteria that can live in the vagina and rectum. It can make your baby sick after birth. If you test positive, you will get antibiotics during labor. These will keep your baby from getting the bacteria. You may want to talk with your doctor about banking your baby's umbilical cord blood. This is the blood left in the cord after birth. If you want to save this blood, you must arrange it ahead of time. You can't decide at the last minute. If you haven't already had the Tdap shot during this pregnancy, talk to your doctor about getting it. It will help protect your  against pertussis infection. Follow-up care is a key part of your treatment and safety. Be sure to make and go to all appointments, and call your doctor if you are having problems. It's also a good idea to know your test results and keep a list of the medicines you take. How can you care for yourself at home? Ease hemorrhoids · Get more liquids, fruits, vegetables, and fiber in your diet. This will help keep your stools soft. · Avoid sitting for too long. Lie on your left side several times a day. · Clean yourself with soft, moist toilet paper. Or you can use witch hazel pads or personal hygiene pads. · If you are uncomfortable, try ice packs. Or you can sit in a warm sitz bath. Do these for 20 minutes at a time, as needed. · Use hydrocortisone cream for pain and itching. Two examples are Anusol and Preparation H Hydrocortisone. · Ask your doctor about taking an over-the-counter stool softener. Consider breastfeeding · Experts recommend that women breastfeed for 1 year or longer. Breast milk is the perfect food for babies. · Breast milk is easier for babies to digest than formula. And it is always available, just the right temperature, and free. · In general, babies who are  are healthier than formula-fed babies. ¨  babies are less likely to get ear infections, colds, diarrhea, and pneumonia. ¨  babies who are fed only breast milk are less likely to get asthma and allergies. ¨  babies are less likely to be obese. ¨  babies are less likely to get diabetes or heart disease. · Women who breastfeed have less bleeding after the birth. Their uteruses also shrink back faster. · Some women who breastfeed lose weight faster. Making milk burns calories. · Breastfeeding can lower your risk of breast cancer, ovarian cancer, and osteoporosis. Decide about circumcision for boys · As you make this decision, it may help to think about your personal, Rastafarian, and family traditions. You get to decide if you will keep your son's penis natural or if he will be circumcised. · If you decide that you would like to have your baby circumcised, talk with your doctor. You can share your concerns about pain. And you can discuss your preferences for anesthesia. Where can you learn more? Go to http://rosalio-emely.info/. Enter G941 in the search box to learn more about \"Weeks 32 to 34 of Your Pregnancy: Care Instructions. \" Current as of: March 16, 2017 Content Version: 11.3 © 9972-4976 [employer]. Care instructions adapted under license by [employer] (which disclaims liability or warranty for this information). If you have questions about a medical condition or this instruction, always ask your healthcare professional. [place] [address] any warranty or liability for your use of this information. Counting Your Baby's Kicks: Care Instructions Your Care Instructions Counting your baby's kicks is one way your doctor can tell that your baby is healthy. Most womenespecially in a first pregnancyfeel their baby move for the first time between 16 and 22 weeks. The movement may feel like flutters rather than kicks. Your baby may move more at certain times of the day. When you are active, you may notice less kicking than when you are resting. At your prenatal visits, your doctor will ask whether the baby is active. In your last trimester, your doctor may ask you to count the number of times you feel your baby move. Follow-up care is a key part of your treatment and safety. Be sure to make and go to all appointments, and call your doctor if you are having problems. It's also a good idea to know your test results and keep a list of the medicines you take. How do you count fetal kicks? · A common method of checking your baby's movement is to count the number of kicks or moves you feel in 1 hour. Ten movements (such as kicks, flutters, or rolls) in 1 hour are normal. Some doctors suggest that you count in the morning until you get to 10 movements. Then you can quit for that day and start again the next day. · Pick your baby's most active time of day to count. This may be any time from morning to evening. · If you do not feel 10 movements in an hour, your baby may be sleeping. Wait for the next hour and count again. When should you call for help? Call your doctor now or seek immediate medical care if: 
· You noticed that your baby has stopped moving or is moving much less than normal. 
Watch closely for changes in your health, and be sure to contact your doctor if you have any problems. Where can you learn more? Go to http://rosalio-emely.info/. Enter K874 in the search box to learn more about \"Counting Your Baby's Kicks: Care Instructions. \" Current as of: March 16, 2017 Content Version: 11.3 © 4025-4402 [employer]. Care instructions adapted under license by [employer] (which disclaims liability or warranty for this information). If you have questions about a medical condition or this instruction, always ask your healthcare professional. [address] any warranty or liability for your use of this information.

## 2017-07-07 NOTE — PROGRESS NOTES
Patient without complaints, states her yeast infection is resolved. [facility] US report reviewed and discussed. Fetus still IUGR but Doppler studies remain reassuring. Patient plans to start twice weekly NST today. 1 hour GCT reviewed and normal.  Thrombocytopenia being followed by hematology. Third trimester packet given. Follow up 2 weeks on rotation. Plan of care discussed. Patient expressed understanding.

## 2017-07-07 NOTE — PROGRESS NOTES
[facility] HEMATOLOGY-MEDICAL ONCOLOGY     Patient Active Problem List   Diagnosis Code    [facility] O99.119, D69.6    Rh negative state in antepartum period O09.899    IUGR (intrauterine growth restriction) affecting care of mother O41.80         Assessment/ Plan:     1.) Thrombocytopenia of Pregnancy    -Currently 32 weeks gestation with her second pregnancy  -Platelet count today pending  -No previous history of low platelet count, splenomegaly, Hepatitis, or HIV  -RTC. in 3-4 weeks for a repeat CBC or sooner if necessary      2.) Previous Miscarriage  -APL-Antibodies and Lupus anticoag. Assay were negative      Thank you for the opportunity to participate in the care, please do not hesitate to call for any questions or concerns. I have discussed the diagnosis with the patient and the intended plan. The patient verbalized understanding and agrees with the plan. History:   Shefali Luna is a 24 y.o. female returning today for: Thrombocytopenia of Pregnancy  -Mild ITP vs. Thrombocytopenia of pregnancy  -No previous history of low platelet count, splenomegaly, Hepatitis, or HIV      She denies fever, chills, chest pain, shortness breath, hemoptysis, hematochezia, melena, hematuria, dysuria, paresthesia, weakness, numbness, and/or loss of consciousness. She denies any new or worsening bony pain. She does complain of increased urinary frequency but no burning. She also complains of some increased fatigue. A 12 point review of systems was otherwise negative. Current Outpatient Prescriptions   Medication Sig Dispense Refill    terconazole (TERAZOL 3) 0.8 % vaginal cream Insert 1 Applicator into vagina nightly. 20 g 0    pnv w/o calcium-iron fum-fa 27-1 mg tab Take  by mouth.          Objective:   VS:    Visit Vitals    LMP 11/23/2016        Physical Exam:     Constitutional:  no acute distress and alert and oriented x 3    HENT:  atraumatic    Eyes:  EOMI, PERRLA    Neck:  No masses    Cardiovascular:  heart sounds normal, S1, S2    Pulmonary/Chest Wall:  breath sounds are clear bilaterally   Abdominal:  Non tender, no palpable masses       Musculoskeletal:  No gross deformities    Skin:  Normal and no rashes or lesions    Peripheral Vascular:  Dorsalis pedis pulse normal bilaterally. Review of Systems: 12 point review of systems otherwise negative      No results found for this or any previous visit (from the past 168 hour(s)). Past Medical History:   Diagnosis Date    Anxiety     Depression        Past Surgical History:   Procedure Laterality Date    HX HERNIA REPAIR  childhood       Social History     Social History    Marital status: SINGLE     Spouse name: N/A    Number of children: N/A    Years of education: N/A     Occupational History    Not on file.      Social History Main Topics    Smoking status: Never Smoker    Smokeless tobacco: Never Used    Alcohol use No    Drug use: No    Sexual activity: Yes     Partners: Male     Birth control/ protection: None     Other Topics Concern    Not on file     Social History Narrative       Family History   Problem Relation Age of Onset    No Known Problems Mother     Diabetes Father     No Known Problems Maternal Grandmother     No Known Problems Maternal Grandfather     No Known Problems Paternal Grandmother     No Known Problems Paternal Grandfather        Allergies   Allergen Reactions    Tramadol Itching       Follow-up Disposition: Not on File      Antonio Christiansen MD  Hematology-Medical Oncology

## 2017-07-07 NOTE — IP AVS SNAPSHOT
Current Discharge Medication List  
  
ASK your doctor about these medications Dose & Instructions Dispensing Information Comments Morning Noon Evening Bedtime  
 pnv w/o calcium-iron fum-fa 27-1 mg Tab Your last dose was: Your next dose is: Take  by mouth. Refills:  0  
     
   
   
   
  
 terconazole 0.8 % vaginal cream  
Commonly known as:  TERAZOL 3 Your last dose was: Your next dose is:    
   
   
 Dose:  1 Applicator Insert 1 Applicator into vagina nightly. Quantity:  20 g Refills:  0

## 2017-07-07 NOTE — PROGRESS NOTES
LABOR AND DELIVERY TRIAGE- Non stress test    Patient presents to L&D Triage for NST  Indication: IUGR  Presenting symptoms and initial assessment discussed with RN caring for the patient. Remote EFM reviewed:  Reactive. [place]:  Quiet. A/P:  NST reactive  Reassuring fetal status.   Disposition: home      Lorenzo Avendano MD  7/7/2017  1:55 PM

## 2017-07-07 NOTE — IP AVS SNAPSHOT
Summary of Care Report The Summary of Care report has been created to help improve care coordination. Users with access to "[employer] or [address] (Web-based application) may access additional patient information including the Discharge Summary. If you are not currently a [address] user and need more information, please call the number listed below in the [place] section and ask to be connected with Medical Records. Facility Information Name Address Phone [facility] [address] [facility] [address] [phone] [phone] Patient Information Patient Name Sex  Bruno Rizzo ([id number]) Female 1995 Discharge Information Admitting Provider Service Area Unit Kane Morelos MD / [address] Labor & Delivery / [phone] Discharge Provider Discharge Date/Time Discharge Disposition Destination (none) 2017 Afternoon (Pending) AHR (none) Patient Language Language ENGLISH [13] Hospital Problems as of 2017  Reviewed: 2017  1:54 PM by Kane Morelos MD  
  
  
  
 Class Noted - Resolved Last Modified POA Active Problems Pregnancy  2017 - Present 2017 by Kane Morelos MD Unknown Entered by Kane Morelos MD  
  
Non-Hospital Problems as of 2017  Reviewed: 2017  1:54 PM by Kane Morelos MD  
  
  
  
 Class Noted - Resolved Last Modified Active Problems Thrombocytopenia affecting pregnancy ([facility] [address]  2017 - Present 2017 by Vivian Don DO Entered by Vivian Don DO Rh negative state in antepartum period  2017 - Present 2017 by Vivian Don DO   Entered by Vivian Don DO  
  IUGR (intrauterine growth restriction) affecting care of mother  2017 - Present 2017 by Vivian Don DO  
 Entered by Philomena Mcfarland, DO You are allergic to the following Allergen Reactions Tramadol Itching Current Discharge Medication List  
  
ASK your doctor about these medications Dose & Instructions Dispensing Information Comments  
 pnv w/o calcium-iron fum-fa 27-1 mg Tab Take  by mouth. Refills:  0  
   
 terconazole 0.8 % vaginal cream  
Commonly known as:  TERAZOL 3 Dose:  1 Applicator Insert 1 Applicator into vagina nightly. Quantity:  20 g Refills:  0 Current Immunizations Name Date Rho(D) Immune Globulin - IM 2/18/2017 Tdap 6/6/2017 Follow-up Information Follow up With Details Comments Contact Info None   None (395) Patient stated that they have no PCP Discharge Instructions Follow up as scheduled Chart Review Routing History No Routing History on File

## 2017-07-07 NOTE — PROGRESS NOTES
Eliazar Harman is a 24 y.o. female who presents today for follow up. Pt educated on staying hydrated and keeping gyn appoinments, pt verbalized understanding. 1. Have you been to the ER, urgent care clinic since your last visit? Hospitalized since your last visit? NO    2. Have you seen or consulted any other health care providers outside of the [address] [place] [address] since your last visit? Include any pap smears or colon screening. NO    Health Maintenance reviewed.     Health Maintenance Due   Topic Date Due    HPV AGE 9Y-34Y (1 of 3 - Female 3 Dose Series) 09/22/2006

## 2017-07-08 LAB
BASOPHILS # BLD AUTO: 0 X10E3/UL (ref 0–0.2)
BASOPHILS NFR BLD AUTO: 0 %
EOSINOPHIL # BLD AUTO: 0.1 X10E3/UL (ref 0–0.4)
EOSINOPHIL NFR BLD AUTO: 1 %
ERYTHROCYTE [DISTWIDTH] IN BLOOD BY AUTOMATED COUNT: 14 % (ref 12.3–15.4)
HCT VFR BLD AUTO: 39.5 % (ref 34–46.6)
HGB BLD-MCNC: 12.8 G/DL (ref 11.1–15.9)
IMM GRANULOCYTES # BLD: 0.1 X10E3/UL (ref 0–0.1)
IMM GRANULOCYTES NFR BLD: 1 %
LYMPHOCYTES # BLD AUTO: 2.4 X10E3/UL (ref 0.7–3.1)
LYMPHOCYTES NFR BLD AUTO: 26 %
MCH RBC QN AUTO: 29.3 PG (ref 26.6–33)
MCHC RBC AUTO-ENTMCNC: 32.4 G/DL (ref 31.5–35.7)
MCV RBC AUTO: 90 FL (ref 79–97)
MONOCYTES # BLD AUTO: 0.7 X10E3/UL (ref 0.1–0.9)
MONOCYTES NFR BLD AUTO: 7 %
MORPHOLOGY BLD-IMP: ABNORMAL
NEUTROPHILS # BLD AUTO: 6 X10E3/UL (ref 1.4–7)
NEUTROPHILS NFR BLD AUTO: 65 %
PLATELET # BLD AUTO: 119 X10E3/UL (ref 150–379)
RBC # BLD AUTO: 4.37 X10E6/UL (ref 3.77–5.28)
WBC # BLD AUTO: 9.3 X10E3/UL (ref 3.4–10.8)

## 2017-07-10 ENCOUNTER — HOSPITAL ENCOUNTER (EMERGENCY)
Age: 22
Discharge: HOME | End: 2017-07-10
Attending: OBSTETRICS & GYNECOLOGY | Admitting: OBSTETRICS & GYNECOLOGY
Payer: MEDICAID

## 2017-07-10 VITALS
WEIGHT: 219.4 LBS | SYSTOLIC BLOOD PRESSURE: 130 MMHG | HEART RATE: 90 BPM | BODY MASS INDEX: 37.46 KG/M2 | HEIGHT: 64 IN | RESPIRATION RATE: 18 BRPM | DIASTOLIC BLOOD PRESSURE: 69 MMHG | TEMPERATURE: 98.9 F

## 2017-07-10 PROCEDURE — 59025 FETAL NON-STRESS TEST: CPT

## 2017-07-10 NOTE — PROGRESS NOTES
NST note:  Diagnosis:  IUGR  Vitals:  wnl  NST results:  reactive  [place]:  quiet  Discharge home in stable condition. Follow up as previously scheduled.

## 2017-07-10 NOTE — IP AVS SNAPSHOT
Current Discharge Medication List  
  
CONTINUE these medications which have NOT CHANGED Dose & Instructions Dispensing Information Comments Morning Noon Evening Bedtime  
 pnv w/o calcium-iron fum-fa 27-1 mg Tab Your last dose was: Your next dose is: Take  by mouth. Refills:  0 STOP taking these medications   
 terconazole 0.8 % vaginal cream  
Commonly known as:  TERAZOL 3

## 2017-07-10 NOTE — IP AVS SNAPSHOT
Summary of Care Report The Summary of Care report has been created to help improve care coordination. Users with access to [employer] or [address] (Web-based application) may access additional patient information including the Discharge Summary. If you are not currently a [address] user and need more information, please call the number listed below in the [place] [address] section and ask to be connected with Medical Records. Facility Information Name Address Phone [facility] [address] [facility] [address] [phone] [phone] Patient Information Patient Name Sex RAMON Gaines ([id number]) Female 1995 Discharge Information Admitting Provider Service Area Unit Richa Green, DO / [address] Labor & Delivery / [phone] Discharge Provider Discharge Date/Time Discharge Disposition Destination (none) 7/10/2017 (Pending) AHR (none) Patient Language Language ENGLISH [13] Hospital Problems as of 7/10/2017  Reviewed: 2017  5:58 PM by Philomena Mcfarland DO None Non-Hospital Problems as of 7/10/2017  Reviewed: 2017  5:58 PM by Philomena Mcfarland DO Class Noted - Resolved Last Modified Active Problems Thrombocytopenia affecting pregnancy ([facility] [address]  2017 - Present 2017 by Philomena Mcfarland,  Entered by Philomena Mcfarland,  Rh negative state in antepartum period  2017 - Present 2017 by Philomena Mcfarland,  Entered by Philomena Mcfarland,   
  IUGR (intrauterine growth restriction) affecting care of mother  2017 - Present 2017 by Philomena Mcfarland,  Entered by Philomena Mcfarland,   
  Pregnancy  2017 - Present 2017 by Anita Galindo MD  
  Entered by Anita Galindo MD  
  
You are allergic to the following Allergen Reactions Tramadol Itching Current Discharge Medication List  
  
CONTINUE these medications which have NOT CHANGED Dose & Instructions Dispensing Information Comments  
 pnv w/o calcium-iron fum-fa 27-1 mg Tab Take  by mouth. Refills:  0 STOP taking these medications Comments  
 terconazole 0.8 % vaginal cream  
Commonly known as:  TERAZOL 3 Current Immunizations Name Date Rho(D) Immune Globulin - IM 2/18/2017 Tdap 6/6/2017 Follow-up Information Follow up With Details Comments Contact Info None   None (395) Patient stated that they have no PCP Discharge Instructions None Chart Review Routing History No Routing History on File

## 2017-07-10 NOTE — PROGRESS NOTES
1343: Arch paged Dr. Prabhjot Carrillo to update on patient fetal tracing. Awaiting return call. 1354: Dr. Prabhjot Carrillo returned call, ok to discharge. 1400: Reviewed discharge instructions with patient. No questions at end of teaching. VSS. Ambulatory off unit.

## 2017-07-10 NOTE — IP AVS SNAPSHOT
[address] Patient: Mane Gutierres MRN: [id number] :1995 You are allergic to the following Allergen Reactions Tramadol Itching Recent Documentation OB Status Smoking Status Pregnant Never Smoker Emergency Contacts Name Discharge Info Relation Home Work Mobile Duyen Snyder CAREGIVER [3] Mother [14] [phone] About your hospitalization You were admitted on:  N/A You last received care in the:  [address] [place] [address] You were discharged on:  July 10, 2017 Unit phone number:  [phone] Why you were hospitalized Your primary diagnosis was:  Not on File Providers Seen During Your Hospitalizations Provider Role Specialty Primary office phone Maty Narvaez DO Attending Provider Obstetrics & Gynecology [phone] Your Primary Care Physician (PCP) Primary Care Physician Office Phone Office Fax NONE ** None ** ** None ** Follow-up Information Follow up With Details Comments Contact Info None   None (395) Patient stated that they have no PCP Your Appointments 2017  9:45 AM EDT Office Visit with Maryann Gannon MD  
[address] [place] [address] ([address]) [facility] [phone]   1:30 PM EDT Follow Up with Star Chaudhary MD  
[address] [place], [place] [address] [facility]) [address] [facility] [address] [phone] Current Discharge Medication List  
  
CONTINUE these medications which have NOT CHANGED Dose & Instructions Dispensing Information Comments Morning Noon Evening Bedtime  
 pnv w/o calcium-iron fum-fa 27-1 mg Tab Your last dose was: Your next dose is: Take  by mouth. Refills:  0 STOP taking these medications   
 terconazole 0.8 % vaginal cream  
Commonly known as:  TERAZOL 3 Discharge Instructions None Discharge Instructions Attachments/References PREGNANCY: WEEKS 32 TO 34 (ENGLISH) PREGNANCY: PRECAUTIONS (ENGLISH) PREGNANCY: KICK COUNTS (ENGLISH) Discharge Orders None Introducing [facility] & HEALTH SERVICES! Dear Rhonda Herman: Thank you for requesting a Interview Master account. Our records indicate that you already have an active Interview Master account. You can access your account anytime at [email or web address] Did you know that you can access your hospital and ER discharge instructions at any time in Interview Master? You can also review all of your test results from your hospital stay or ER visit. Additional Information If you have questions, please visit the Frequently Asked Questions section of the Interview Master website at [email or web address] Remember, Interview Master is NOT to be used for urgent needs. For medical emergencies, dial 911. Now available from your iPhone and Android! General Information Please provide this summary of care documentation to your next provider. Patient Signature:  ____________________________________________________________ Date:  ____________________________________________________________  
  
Bety Taylor Provider Signature:  ____________________________________________________________ Date:  ____________________________________________________________ More Information Weeks 32 to 34 of Your Pregnancy: Care Instructions Your Care Instructions During the last few weeks of your pregnancy, you may have more aches and pains. It's important to rest when you can. Your growing baby is putting more pressure on your bladder. So you may need to urinate more often. Hemorrhoids are also common.  These are painful, itchy veins in the rectal area. In the 36th week, most women have a test for group B streptococcus (GBS). GBS is a common bacteria that can live in the vagina and rectum. It can make your baby sick after birth. If you test positive, you will get antibiotics during labor. These will keep your baby from getting the bacteria. You may want to talk with your doctor about banking your baby's umbilical cord blood. This is the blood left in the cord after birth. If you want to save this blood, you must arrange it ahead of time. You can't decide at the last minute. If you haven't already had the Tdap shot during this pregnancy, talk to your doctor about getting it. It will help protect your  against pertussis infection. Follow-up care is a key part of your treatment and safety. Be sure to make and go to all appointments, and call your doctor if you are having problems. It's also a good idea to know your test results and keep a list of the medicines you take. How can you care for yourself at home? Ease hemorrhoids · Get more liquids, fruits, vegetables, and fiber in your diet. This will help keep your stools soft. · Avoid sitting for too long. Lie on your left side several times a day. · Clean yourself with soft, moist toilet paper. Or you can use witch hazel pads or personal hygiene pads. · If you are uncomfortable, try ice packs. Or you can sit in a warm sitz bath. Do these for 20 minutes at a time, as needed. · Use hydrocortisone cream for pain and itching. Two examples are Anusol and Preparation H Hydrocortisone. · Ask your doctor about taking an over-the-counter stool softener. Consider breastfeeding · Experts recommend that women breastfeed for 1 year or longer. Breast milk is the perfect food for babies. · Breast milk is easier for babies to digest than formula. And it is always available, just the right temperature, and free. · In general, babies who are  are healthier than formula-fed babies. ¨  babies are less likely to get ear infections, colds, diarrhea, and pneumonia. ¨  babies who are fed only breast milk are less likely to get asthma and allergies. ¨  babies are less likely to be obese. ¨  babies are less likely to get diabetes or heart disease. · Women who breastfeed have less bleeding after the birth. Their uteruses also shrink back faster. · Some women who breastfeed lose weight faster. Making milk burns calories. · Breastfeeding can lower your risk of breast cancer, ovarian cancer, and osteoporosis. Decide about circumcision for boys · As you make this decision, it may help to think about your personal, Cheondoism, and family traditions. You get to decide if you will keep your son's penis natural or if he will be circumcised. · If you decide that you would like to have your baby circumcised, talk with your doctor. You can share your concerns about pain. And you can discuss your preferences for anesthesia. Where can you learn more? Go to http://rosalio-emely.info/. Enter C684 in the search box to learn more about \"Weeks 32 to 34 of Your Pregnancy: Care Instructions. \" Current as of: 2017 Content Version: 11.3 © 6581-9824 Pantech. Care instructions adapted under license by New Relic (which disclaims liability or warranty for this information). If you have questions about a medical condition or this instruction, always ask your healthcare professional. [place] [address] any warranty or liability for your use of this information. Pregnancy Precautions: Care Instructions Your Care Instructions There is no sure way to prevent labor before your due date ( labor) or to prevent most other pregnancy problems.  But there are things you can do to increase your chances of a healthy pregnancy. Go to your appointments, follow your doctor's advice, and take good care of yourself. Eat well, and exercise (if your doctor agrees). And make sure to drink plenty of water. Follow-up care is a key part of your treatment and safety. Be sure to make and go to all appointments, and call your doctor if you are having problems. It's also a good idea to know your test results and keep a list of the medicines you take. How can you care for yourself at home? · Make sure you go to your prenatal appointments. At each visit, your doctor will check your blood pressure. Your doctor will also check to see if you have protein in your urine. High blood pressure and protein in urine are signs of preeclampsia. This condition can be dangerous for you and your baby. · Drink plenty of fluids, enough so that your urine is light yellow or clear like water. Dehydration can cause contractions. If you have kidney, heart, or liver disease and have to limit fluids, talk with your doctor before you increase the amount of fluids you drink. · Tell your doctor right away if you notice any symptoms of an infection, such as: ¨ Burning when you urinate. ¨ A foul-smelling discharge from your vagina. ¨ Vaginal itching. ¨ Unexplained fever. ¨ Unusual pain or soreness in your uterus or lower belly. · Eat a balanced diet. Include plenty of foods that are high in calcium and iron. ¨ Foods high in calcium include milk, cheese, yogurt, almonds, and broccoli. ¨ Foods high in iron include red meat, shellfish, poultry, eggs, beans, raisins, whole-grain bread, and leafy green vegetables. · Do not smoke. If you need help quitting, talk to your doctor about stop-smoking programs and medicines. These can increase your chances of quitting for good. · Do not drink alcohol or use illegal drugs. · Follow your doctor's directions about activity.  Your doctor will let you know how much, if any, exercise you can do. · Ask your doctor if you can have sex. If you are at risk for early labor, your doctor may ask you to not have sex. · Take care to prevent falls. During pregnancy, your joints are loose, and your balance is off. Sports such as bicycling, skiing, or in-line skating can increase your risk of falling. And don't ride horses or motorcycles, dive, water ski, scuba dive, or parachute jump while you are pregnant. · Avoid getting very hot. Do not use saunas or hot tubs. Avoid staying out in the sun in hot weather for long periods. Take acetaminophen (Tylenol) to lower a high fever. · Do not take any over-the-counter or herbal medicines or supplements without talking to your doctor or pharmacist first. 
When should you call for help? Call 911 anytime you think you may need emergency care. For example, call if: 
· You passed out (lost consciousness). · You have severe vaginal bleeding. · You have severe pain in your belly or pelvis. · You have had fluid gushing or leaking from your vagina and you know or think the umbilical cord is bulging into your vagina. If this happens, immediately get down on your knees so your rear end (buttocks) is higher than your head. This will decrease the pressure on the cord until help arrives. Call your doctor now or seek immediate medical care if: 
· You have signs of preeclampsia, such as: 
¨ Sudden swelling of your face, hands, or feet. ¨ New vision problems (such as dimness or blurring). ¨ A severe headache. · You have any vaginal bleeding. · You have belly pain or cramping. · You have a fever. · You have had regular contractions (with or without pain) for an hour. This means that you have 8 or more within 1 hour or 4 or more in 20 minutes after you change your position and drink fluids. · You have a sudden release of fluid from your vagina. · You have low back pain or pelvic pressure that does not go away. · You notice that your baby has stopped moving or is moving much less than normal. 
Watch closely for changes in your health, and be sure to contact your doctor if you have any problems. Where can you learn more? Go to http://rosalio-emely.info/. Enter [id number] in the search box to learn more about \"Pregnancy Precautions: Care Instructions. \" Current as of: March 16, 2017 Content Version: 11.3 © 4486-6390 GamaMabs Pharma. Care instructions adapted under license by Cryptonator (which disclaims liability or warranty for this information). If you have questions about a medical condition or this instruction, always ask your healthcare professional. [address] any warranty or liability for your use of this information. Counting Your Baby's Kicks: Care Instructions Your Care Instructions Counting your baby's kicks is one way your doctor can tell that your baby is healthy. Most womenespecially in a first pregnancyfeel their baby move for the first time between 16 and 22 weeks. The movement may feel like flutters rather than kicks. Your baby may move more at certain times of the day. When you are active, you may notice less kicking than when you are resting. At your prenatal visits, your doctor will ask whether the baby is active. In your last trimester, your doctor may ask you to count the number of times you feel your baby move. Follow-up care is a key part of your treatment and safety. Be sure to make and go to all appointments, and call your doctor if you are having problems. It's also a good idea to know your test results and keep a list of the medicines you take. How do you count fetal kicks? · A common method of checking your baby's movement is to count the number of kicks or moves you feel in 1 hour.  Ten movements (such as kicks, flutters, or rolls) in 1 hour are normal. Some doctors suggest that you count in the morning until you get to 10 movements. Then you can quit for that day and start again the next day. · Pick your baby's most active time of day to count. This may be any time from morning to evening. · If you do not feel 10 movements in an hour, your baby may be sleeping. Wait for the next hour and count again. When should you call for help? Call your doctor now or seek immediate medical care if: 
· You noticed that your baby has stopped moving or is moving much less than normal. 
Watch closely for changes in your health, and be sure to contact your doctor if you have any problems. Where can you learn more? Go to http://rosalio-emely.info/. Enter K486 in the search box to learn more about \"Counting Your Baby's Kicks: Care Instructions. \" Current as of: March 16, 2017 Content Version: 11.3 © 4369-0040 ProfitSee. Care instructions adapted under license by XGraph (which disclaims liability or warranty for this information). If you have questions about a medical condition or this instruction, always ask your healthcare professional. [address] any warranty or liability for your use of this information.

## 2017-07-15 ENCOUNTER — HOSPITAL ENCOUNTER (EMERGENCY)
Age: 22
Discharge: HOME | End: 2017-07-15
Attending: OBSTETRICS & GYNECOLOGY | Admitting: OBSTETRICS & GYNECOLOGY
Payer: MEDICAID

## 2017-07-15 VITALS — RESPIRATION RATE: 18 BRPM | TEMPERATURE: 98.7 F

## 2017-07-15 PROCEDURE — 59025 FETAL NON-STRESS TEST: CPT

## 2017-07-15 NOTE — IP AVS SNAPSHOT
Current Discharge Medication List  
  
ASK your doctor about these medications Dose & Instructions Dispensing Information Comments Morning Noon Evening Bedtime  
 pnv w/o calcium-iron fum-fa 27-1 mg Tab Your last dose was: Your next dose is: Take  by mouth. Refills:  0

## 2017-07-15 NOTE — IP AVS SNAPSHOT
[address] Patient: Sarita Valera MRN: [id number] :1995 You are allergic to the following Allergen Reactions Tramadol Itching Recent Documentation OB Status Smoking Status Pregnant Never Smoker Emergency Contacts Name Discharge Info Relation Home Work Mobile Sheri Burns CAREGIVER [3] Mother [14] [phone] About your hospitalization You were admitted on:  N/A You last received care in the:  [address] [place] [address] You were discharged on:  July 15, 2017 Unit phone number:  [phone] Why you were hospitalized Your primary diagnosis was:  Not on File Providers Seen During Your Hospitalizations Provider Role Specialty Primary office phone Jah Robles DO Attending Provider Obstetrics & Gynecology [phone] Your Primary Care Physician (PCP) Primary Care Physician Office Phone Office Fax NONE ** None ** ** None ** Follow-up Information Follow up With Details Comments Contact Info None   None (395) Patient stated that they have no PCP Your Appointments 2017  9:45 AM EDT Office Visit with Peter Sullivan MD  
[facility] [address] [phone]   1:30 PM EDT Follow Up with Chika Carr MD  
[facility]) [address] [facility] [address] [phone] Current Discharge Medication List  
  
ASK your doctor about these medications Dose & Instructions Dispensing Information Comments Morning Noon Evening Bedtime  
 pnv w/o calcium-iron fum-fa 27-1 mg Tab Your last dose was: Your next dose is: Take  by mouth. Refills:  0 Discharge Instructions Discharge instructions reviewed with pt verbally and pt given written copy of instructions. NOTIFY YOUR DOCTOR/PROVIDER IF: 
 
Signs and symptoms of labor-continuing tightening and relaxation of abdomen Fever 100.4 Bleeding or leaking of fluid from the vagina Persistent headache that does not go away with rest and tylenol Severe abdominal pain Fetal kick counts - 10 baby movements in 1 hour Hydration- eight 8 oz glasses of water every day Visual disturbances Nausea and vomiting for more than 12 hours. Questions asked and answered. Follow up as scheduled in office Discharge Instructions Attachments/References PREGNANCY: PRECAUTIONS (ENGLISH) Discharge Orders None Introducing [facility] & HEALTH SERVICES! Dear Keo Ramirez: Thank you for requesting a Holograam account. Our records indicate that you already have an active Holograam account. You can access your account anytime at [email or web address] Did you know that you can access your hospital and ER discharge instructions at any time in Holograam? You can also review all of your test results from your hospital stay or ER visit. Additional Information If you have questions, please visit the Frequently Asked Questions section of the Holograam website at [email or web address] Remember, Holograam is NOT to be used for urgent needs. For medical emergencies, dial 911. Now available from your iPhone and Android! General Information Please provide this summary of care documentation to your next provider. Patient Signature:  ____________________________________________________________ Date:  ____________________________________________________________  
  
Nivia Alfonso Provider Signature:  ____________________________________________________________ Date:  ____________________________________________________________ More Information Pregnancy Precautions: Care Instructions Your Care Instructions There is no sure way to prevent labor before your due date ( labor) or to prevent most other pregnancy problems. But there are things you can do to increase your chances of a healthy pregnancy. Go to your appointments, follow your doctor's advice, and take good care of yourself. Eat well, and exercise (if your doctor agrees). And make sure to drink plenty of water. Follow-up care is a key part of your treatment and safety. Be sure to make and go to all appointments, and call your doctor if you are having problems. It's also a good idea to know your test results and keep a list of the medicines you take. How can you care for yourself at home? · Make sure you go to your prenatal appointments. At each visit, your doctor will check your blood pressure. Your doctor will also check to see if you have protein in your urine. High blood pressure and protein in urine are signs of preeclampsia. This condition can be dangerous for you and your baby. · Drink plenty of fluids, enough so that your urine is light yellow or clear like water. Dehydration can cause contractions. If you have kidney, heart, or liver disease and have to limit fluids, talk with your doctor before you increase the amount of fluids you drink. · Tell your doctor right away if you notice any symptoms of an infection, such as: ¨ Burning when you urinate. ¨ A foul-smelling discharge from your vagina. ¨ Vaginal itching. ¨ Unexplained fever. ¨ Unusual pain or soreness in your uterus or lower belly. · Eat a balanced diet. Include plenty of foods that are high in calcium and iron. ¨ Foods high in calcium include milk, cheese, yogurt, almonds, and broccoli.  
¨ Foods high in iron include red meat, shellfish, poultry, eggs, beans, raisins, whole-grain bread, and leafy green vegetables. · Do not smoke. If you need help quitting, talk to your doctor about stop-smoking programs and medicines. These can increase your chances of quitting for good. · Do not drink alcohol or use illegal drugs. · Follow your doctor's directions about activity. Your doctor will let you know how much, if any, exercise you can do. · Ask your doctor if you can have sex. If you are at risk for early labor, your doctor may ask you to not have sex. · Take care to prevent falls. During pregnancy, your joints are loose, and your balance is off. Sports such as bicycling, skiing, or in-line skating can increase your risk of falling. And don't ride horses or motorcycles, dive, water ski, scuba dive, or parachute jump while you are pregnant. · Avoid getting very hot. Do not use saunas or hot tubs. Avoid staying out in the sun in hot weather for long periods. Take acetaminophen (Tylenol) to lower a high fever. · Do not take any over-the-counter or herbal medicines or supplements without talking to your doctor or pharmacist first. 
When should you call for help? Call 911 anytime you think you may need emergency care. For example, call if: 
· You passed out (lost consciousness). · You have severe vaginal bleeding. · You have severe pain in your belly or pelvis. · You have had fluid gushing or leaking from your vagina and you know or think the umbilical cord is bulging into your vagina. If this happens, immediately get down on your knees so your rear end (buttocks) is higher than your head. This will decrease the pressure on the cord until help arrives. Call your doctor now or seek immediate medical care if: 
· You have signs of preeclampsia, such as: 
¨ Sudden swelling of your face, hands, or feet. ¨ New vision problems (such as dimness or blurring). ¨ A severe headache. · You have any vaginal bleeding. · You have belly pain or cramping. · You have a fever. · You have had regular contractions (with or without pain) for an hour. This means that you have 8 or more within 1 hour or 4 or more in 20 minutes after you change your position and drink fluids. · You have a sudden release of fluid from your vagina. · You have low back pain or pelvic pressure that does not go away. · You notice that your baby has stopped moving or is moving much less than normal. 
Watch closely for changes in your health, and be sure to contact your doctor if you have any problems. Where can you learn more? Go to http://rosalio-emely.info/. Enter [id number] in the search box to learn more about \"Pregnancy Precautions: Care Instructions. \" Current as of: March 16, 2017 Content Version: 11.3 © 1363-3293 SoundCure, Integral Wave Technologies. Care instructions adapted under license by Fastlane Ventures (which disclaims liability or warranty for this information). If you have questions about a medical condition or this instruction, always ask your healthcare professional. [address] any warranty or liability for your use of this information.

## 2017-07-15 NOTE — IP AVS SNAPSHOT
Summary of Care Report The Summary of Care report has been created to help improve care coordination. Users with access to [place] or [address] (Web-based application) may access additional patient information including the Discharge Summary. If you are not currently a [address] user and need more information, please call the number listed below in the [place] [address] section and ask to be connected with Medical Records. Facility Information Name Address Phone [facility] [address] [facility] [address] [id number] [phone] Patient Information Patient Name Sex DEANNA Linares ([id number]) Female 1995 Discharge Information Admitting Provider Service Area Unit Eveline Nash DO / [address] Labor & Delivery / [phone] Discharge Provider Discharge Date/Time Discharge Disposition Destination (none) 7/15/2017 Evening (Pending) AHR (none) Patient Language Language ENGLISH [13] Hospital Problems as of 7/15/2017  Reviewed: 2017  5:58 PM by Eveline Nash DO None Non-Hospital Problems as of 7/15/2017  Reviewed: 2017  5:58 PM by Eveline Nash DO Class Noted - Resolved Last Modified Active Problems Thrombocytopenia affecting pregnancy ([facility] [address]  2017 - Present 2017 by Eveline Nash,  Entered by Eveline Nash DO Rh negative state in antepartum period  2017 - Present 2017 by Eveline Nash DO Entered by Eveline Nash DO  
  IUGR (intrauterine growth restriction) affecting care of mother  2017 - Present 2017 by Eveline Nash,  Entered by Eveline Nash DO  
  Pregnancy  2017 - Present 2017 by Reagan Liu MD  
  Entered by Reagan Liu MD  
  
You are allergic to the following Allergen Reactions Tramadol Itching Current Discharge Medication List  
  
ASK your doctor about these medications Dose & Instructions Dispensing Information Comments  
 pnv w/o calcium-iron fum-fa 27-1 mg Tab Take  by mouth. Refills:  0 Current Immunizations Name Date Rho(D) Immune Globulin - IM 2/18/2017 Tdap 6/6/2017 Follow-up Information Follow up With Details Comments Contact Info None   None (395) Patient stated that they have no PCP Discharge Instructions Discharge instructions reviewed with pt verbally and pt given written copy of instructions. NOTIFY YOUR DOCTOR/PROVIDER IF: 
 
Signs and symptoms of labor-continuing tightening and relaxation of abdomen Fever 100.4 Bleeding or leaking of fluid from the vagina Persistent headache that does not go away with rest and tylenol Severe abdominal pain Fetal kick counts - 10 baby movements in 1 hour Hydration- eight 8 oz glasses of water every day Visual disturbances Nausea and vomiting for more than 12 hours. Questions asked and answered. Follow up as scheduled in office Chart Review Routing History No Routing History on File

## 2017-07-15 NOTE — PROGRESS NOTES
Arrived on unit for NST for SGA . Oriented to room and surroundings in 9198. Denies headache, dizziness,  Or s/sy of labor. Admits  To fetal movement. EFM applied   Lilli Hoyt - Dr Live Breaux called aware of reactive NST. Orders received for discharge home with f/u as scheduled in office. Discharged to home ,reviewed discharge instructions, verbalizes understanding of self care.

## 2017-07-15 NOTE — DISCHARGE INSTRUCTIONS
Discharge instructions reviewed with pt verbally and pt given written copy of instructions. NOTIFY YOUR DOCTOR/PROVIDER IF:    Signs and symptoms of labor-continuing tightening and relaxation of abdomen  Fever 100.4  Bleeding or leaking of fluid from the vagina  Persistent headache that does not go away with rest and tylenol  Severe abdominal pain    Fetal kick counts - 10 baby movements in 1 hour   Hydration- eight 8 oz glasses of water every day  Visual disturbances  Nausea and vomiting for more than 12 hours. Questions asked and answered.         Follow up as scheduled in office

## 2017-07-17 ENCOUNTER — HOSPITAL ENCOUNTER (OUTPATIENT)
Age: 22
Setting detail: OBSERVATION
Discharge: HOME | End: 2017-07-17
Attending: OBSTETRICS & GYNECOLOGY | Admitting: OBSTETRICS & GYNECOLOGY
Payer: MEDICAID

## 2017-07-17 VITALS
DIASTOLIC BLOOD PRESSURE: 73 MMHG | BODY MASS INDEX: 37.56 KG/M2 | TEMPERATURE: 98.2 F | HEIGHT: 64 IN | HEART RATE: 82 BPM | RESPIRATION RATE: 16 BRPM | WEIGHT: 220 LBS | SYSTOLIC BLOOD PRESSURE: 140 MMHG

## 2017-07-17 PROBLEM — Z33.1 IUP (INTRAUTERINE PREGNANCY), INCIDENTAL: Status: ACTIVE | Noted: 2017-07-17

## 2017-07-17 PROCEDURE — 76815 OB US LIMITED FETUS(S): CPT

## 2017-07-17 PROCEDURE — 99218 HC RM OBSERVATION: CPT

## 2017-07-17 PROCEDURE — 59025 FETAL NON-STRESS TEST: CPT

## 2017-07-17 PROCEDURE — 99282 EMERGENCY DEPT VISIT SF MDM: CPT

## 2017-07-17 NOTE — DISCHARGE INSTRUCTIONS
Please follow up with Dr. Vannesa Pena this week. Weeks 34 to 36 of Your Pregnancy: Care Instructions  Your Care Instructions    By now, your baby and your belly have grown quite large. It is almost time to give birth. A full-term pregnancy can deliver between 37 and 42 weeks. Your baby's lungs are almost ready to breathe air. The bones in your baby's head are now firm enough to protect it, but soft enough to move down through the birth canal.  You may feel excited, happy, anxious, or scared. You may wonder how you will know if you are in labor or what to expect during labor. Try to be flexible in your expectations of the birth. Because each birth is different, there is no way to know exactly what childbirth will be like for you. This care sheet will help you know what to expect and how to prepare. This may make your childbirth easier. If you haven't already had the Tdap shot during this pregnancy, talk to your doctor about getting it. It will help protect your  against pertussis infection. In the 36th week, most women have a test for group B streptococcus (GBS). GBS is a common bacteria that can live in the vagina and rectum. It can make your baby sick after birth. If you test positive, you will get antibiotics during labor. The medicine will keep your baby from getting the bacteria. Follow-up care is a key part of your treatment and safety. Be sure to make and go to all appointments, and call your doctor if you are having problems. It's also a good idea to know your test results and keep a list of the medicines you take. How can you care for yourself at home? Learn about pain relief choices  · Pain is different for every woman. Talk with your doctor about your feelings about pain. · You can choose from several types of pain relief. These include medicine or breathing techniques, as well as comfort measures. You can use more than one option.   · If you choose to have pain medicine during labor, talk to your doctor about your options. Some medicines lower anxiety and help with some of the pain. Others make your lower body numb so that you won't feel pain. · Be sure to tell your doctor about your pain medicine choice before you start labor or very early in your labor. You may be able to change your mind as labor progresses. · Rarely, a woman is put to sleep by medicine given through a mask or an IV. Labor and delivery  · The first stage of labor has three parts: early, active, and transition. ¨ Most women have early labor at home. You can stay busy or rest, eat light snacks, drink clear fluids, and start counting contractions. ¨ When talking during a contraction gets hard, you may be moving to active labor. During active labor, you should head for the hospital if you are not there already. ¨ You are in active labor when contractions come every 3 to 4 minutes and last about 60 seconds. Your cervix is opening more rapidly. ¨ If your water breaks, contractions will come faster and stronger. ¨ During transition, your cervix is stretching, and contractions are coming more rapidly. ¨ You may want to push, but your cervix might not be ready. Your doctor will tell you when to push. · The second stage starts when your cervix is completely opened and you are ready to push. ¨ Contractions are very strong to push the baby down the birth canal.  ¨ You will feel the urge to push. You may feel like you need to have a bowel movement. ¨ You may be coached to push with contractions. These contractions will be very strong, but you will not have them as often. You can get a little rest between contractions. ¨ You may be emotional and irritable. You may not be aware of what is going on around you. ¨ One last push, and your baby is born. · The third stage is when a few more contractions push out the placenta. This may take 30 minutes or less. · The fourth stage is the welcome recovery.  You may feel overwhelmed with emotions and exhausted but alert. This is a good time to start breastfeeding. Where can you learn more? Go to http://rosalio-emely.info/. Enter C348 in the search box to learn more about \"Weeks 34 to 36 of Your Pregnancy: Care Instructions. \"  Current as of: March 16, 2017  Content Version: 11.3  © 4809-0448 [employer]. Care instructions adapted under license by [employer] (which disclaims liability or warranty for this information). If you have questions about a medical condition or this instruction, always ask your healthcare professional. [address] any warranty or liability for your use of this information. Counting Your Baby's Kicks: Care Instructions  Your Care Instructions  Counting your baby's kicks is one way your doctor can tell that your baby is healthy. Most women--especially in a first pregnancy--feel their baby move for the first time between 16 and 22 weeks. The movement may feel like flutters rather than kicks. Your baby may move more at certain times of the day. When you are active, you may notice less kicking than when you are resting. At your prenatal visits, your doctor will ask whether the baby is active. In your last trimester, your doctor may ask you to count the number of times you feel your baby move. Follow-up care is a key part of your treatment and safety. Be sure to make and go to all appointments, and call your doctor if you are having problems. It's also a good idea to know your test results and keep a list of the medicines you take. How do you count fetal kicks? · A common method of checking your baby's movement is to count the number of kicks or moves you feel in 1 hour. Ten movements (such as kicks, flutters, or rolls) in 1 hour are normal. Some doctors suggest that you count in the morning until you get to 10 movements. Then you can quit for that day and start again the next day.   · Pick your baby's most active time of day to count. This may be any time from morning to evening. · If you do not feel 10 movements in an hour, your baby may be sleeping. Wait for the next hour and count again. When should you call for help? Call your doctor now or seek immediate medical care if:  · You noticed that your baby has stopped moving or is moving much less than normal.  Watch closely for changes in your health, and be sure to contact your doctor if you have any problems. Where can you learn more? Go to http://rosalio-emely.info/. Enter E381 in the search box to learn more about \"Counting Your Baby's Kicks: Care Instructions. \"  Current as of: March 16, 2017  Content Version: 11.3  © 8625-2455 [email or web address]. Care instructions adapted under license by [employer] (which disclaims liability or warranty for this information). If you have questions about a medical condition or this instruction, always ask your healthcare professional. [place] [address] any warranty or liability for your use of this information. Learning About When to Call Your Doctor During Pregnancy (After 20 Weeks)  Your Care Instructions  It's common to have concerns about what might be a problem during pregnancy. Although most pregnant women don't have any serious problems, it's important to know when to call your doctor if you have certain symptoms or signs of labor. These are general suggestions. Your doctor may give you some more information about when to call. When to call your doctor (after 20 weeks)  Call 911 anytime you think you may need emergency care. For example, call if:  · You have severe vaginal bleeding. · You have sudden, severe pain in your belly. · You passed out (lost consciousness). · You have a seizure. · You see or feel the umbilical cord.   · You think you are about to deliver your baby and can't make it safely to the hospital.  Call your doctor now or seek immediate medical care if:  · You have vaginal bleeding. · You have belly pain. · You have a fever. · You have symptoms of preeclampsia, such as:  ¨ Sudden swelling of your face, hands, or feet. ¨ New vision problems (such as dimness or blurring). ¨ A severe headache. · You have a sudden release of fluid from your vagina. (You think your water broke.)  · You think that you may be in labor. This means that you've had at least 4 contractions within 20 minutes or at least 8 contractions in an hour. · You notice that your baby has stopped moving or is moving much less than normal.  · You have symptoms of a urinary tract infection. These may include:  ¨ Pain or burning when you urinate. ¨ A frequent need to urinate without being able to pass much urine. ¨ Pain in the flank, which is just below the rib cage and above the waist on either side of the back. ¨ Blood in your urine. Watch closely for changes in your health, and be sure to contact your doctor if:  · You have vaginal discharge that smells bad. · You have skin changes, such as:  ¨ A rash. ¨ Itching. ¨ Yellow color to your skin. · You have other concerns about your pregnancy. If you have labor signs at 37 weeks or more  If you have signs of labor at 37 weeks or more, your doctor may tell you to call when your labor becomes more active. Symptoms of active labor include:  · Contractions that are regular. · Contractions that are less than 5 minutes apart. · Contractions that are hard to talk through. Follow-up care is a key part of your treatment and safety. Be sure to make and go to all appointments, and call your doctor if you are having problems. It's also a good idea to know your test results and keep a list of the medicines you take. Where can you learn more? Go to http://rosalio-emely.info/.   Enter  in the search box to learn more about \"Learning About When to Call Your Doctor During Pregnancy (After 20 Weeks). \"  Current as of: March 16, 2017  Content Version: 11.3  © 3651-6104 [employer]. Care instructions adapted under license by [employer] (which disclaims liability or warranty for this information). If you have questions about a medical condition or this instruction, always ask your healthcare professional. [place] [address] any warranty or liability for your use of this information.

## 2017-07-17 NOTE — IP AVS SNAPSHOT
[address] [place] [id number] 
[phone] Patient: Mane Gutierres MRN: [id number] :[id number] You are allergic to the following Allergen Reactions Tramadol Itching Recent Documentation Height Weight BMI OB Status Smoking Status 1.626 m 99.8 kg 37.76 kg/m2 Pregnant Never Smoker Emergency Contacts Name Discharge Info Relation Home Work Mobile Duyen Snyder CAREGIVER [3] Mother [14] [phone] About your hospitalization You were admitted on:  N/A You last received care in the:  [facility] [place] [address] [facility] You were discharged on:  2017 Unit phone number:  [phone] Why you were hospitalized Your primary diagnosis was:  Not on File Providers Seen During Your Hospitalizations Provider Role Specialty Primary office phone Sade Scott MD Attending Provider Obstetrics & Gynecology [phone] Your Primary Care Physician (PCP) Primary Care Physician Office Phone Office Fax NONE ** None ** ** None ** Follow-up Information Follow up With Details Comments Contact Info None   None ([phone]) Patient stated that they have no PCP Your Appointments 2017  9:45 AM EDT Office Visit with Maryann Gannon MD  
[address] [place] [address] ([address]) [facility] [phone] [id number] [phone]   1:30 PM EDT Follow Up with Star Chaudhary MD  
[address] [place] [address] [facility] [id number]  
[phone] Current Discharge Medication List  
  
ASK your doctor about these medications Dose & Instructions Dispensing Information Comments Morning Noon Evening Bedtime  
 pnv w/o calcium-iron fum-fa 27-1 mg Tab Your last dose was: Your next dose is: Take  by mouth. Refills:  0 Discharge Instructions Please follow up with Dr. Taqueria Cardozo this week. Weeks 34 to 36 of Your Pregnancy: Care Instructions Your Care Instructions By now, your baby and your belly have grown quite large. It is almost time to give birth. A full-term pregnancy can deliver between 37 and 42 weeks. Your baby's lungs are almost ready to breathe air. The bones in your baby's head are now firm enough to protect it, but soft enough to move down through the birth canal. 
You may feel excited, happy, anxious, or scared. You may wonder how you will know if you are in labor or what to expect during labor. Try to be flexible in your expectations of the birth. Because each birth is different, there is no way to know exactly what childbirth will be like for you. This care sheet will help you know what to expect and how to prepare. This may make your childbirth easier. If you haven't already had the Tdap shot during this pregnancy, talk to your doctor about getting it. It will help protect your  against pertussis infection. In the 36th week, most women have a test for group B streptococcus (GBS). GBS is a common bacteria that can live in the vagina and rectum. It can make your baby sick after birth. If you test positive, you will get antibiotics during labor. The medicine will keep your baby from getting the bacteria. Follow-up care is a key part of your treatment and safety. Be sure to make and go to all appointments, and call your doctor if you are having problems. It's also a good idea to know your test results and keep a list of the medicines you take. How can you care for yourself at home? Learn about pain relief choices · Pain is different for every woman. Talk with your doctor about your feelings about pain. · You can choose from several types of pain relief.  These include medicine or breathing techniques, as well as comfort measures. You can use more than one option. · If you choose to have pain medicine during labor, talk to your doctor about your options. Some medicines lower anxiety and help with some of the pain. Others make your lower body numb so that you won't feel pain. · Be sure to tell your doctor about your pain medicine choice before you start labor or very early in your labor. You may be able to change your mind as labor progresses. · Rarely, a woman is put to sleep by medicine given through a mask or an IV. Labor and delivery · The first stage of labor has three parts: early, active, and transition. ¨ Most women have early labor at home. You can stay busy or rest, eat light snacks, drink clear fluids, and start counting contractions. ¨ When talking during a contraction gets hard, you may be moving to active labor. During active labor, you should head for the hospital if you are not there already. ¨ You are in active labor when contractions come every 3 to 4 minutes and last about 60 seconds. Your cervix is opening more rapidly. ¨ If your water breaks, contractions will come faster and stronger. ¨ During transition, your cervix is stretching, and contractions are coming more rapidly. ¨ You may want to push, but your cervix might not be ready. Your doctor will tell you when to push. · The second stage starts when your cervix is completely opened and you are ready to push. ¨ Contractions are very strong to push the baby down the birth canal. 
¨ You will feel the urge to push. You may feel like you need to have a bowel movement. ¨ You may be coached to push with contractions. These contractions will be very strong, but you will not have them as often. You can get a little rest between contractions. ¨ You may be emotional and irritable. You may not be aware of what is going on around you. ¨ One last push, and your baby is born. · The third stage is when a few more contractions push out the placenta. This may take 30 minutes or less. · The fourth stage is the welcome recovery. You may feel overwhelmed with emotions and exhausted but alert. This is a good time to start breastfeeding. Where can you learn more? Go to http://rosalio-emely.info/. Enter I478 in the search box to learn more about \"Weeks 34 to 36 of Your Pregnancy: Care Instructions. \" Current as of: March 16, 2017 Content Version: 11.3 © 0871-4457 [employer]. Care instructions adapted under license by [employer] (which disclaims liability or warranty for this information). If you have questions about a medical condition or this instruction, always ask your healthcare professional. [address] any warranty or liability for your use of this information. Counting Your Baby's Kicks: Care Instructions Your Care Instructions Counting your baby's kicks is one way your doctor can tell that your baby is healthy. Most womenespecially in a first pregnancyfeel their baby move for the first time between 16 and 22 weeks. The movement may feel like flutters rather than kicks. Your baby may move more at certain times of the day. When you are active, you may notice less kicking than when you are resting. At your prenatal visits, your doctor will ask whether the baby is active. In your last trimester, your doctor may ask you to count the number of times you feel your baby move. Follow-up care is a key part of your treatment and safety. Be sure to make and go to all appointments, and call your doctor if you are having problems. It's also a good idea to know your test results and keep a list of the medicines you take. How do you count fetal kicks? · A common method of checking your baby's movement is to count the number of kicks or moves you feel in 1 hour.  Ten movements (such as kicks, flutters, or rolls) in 1 hour are normal. Some doctors suggest that you count in the morning until you get to 10 movements. Then you can quit for that day and start again the next day. · Pick your baby's most active time of day to count. This may be any time from morning to evening. · If you do not feel 10 movements in an hour, your baby may be sleeping. Wait for the next hour and count again. When should you call for help? Call your doctor now or seek immediate medical care if: 
· You noticed that your baby has stopped moving or is moving much less than normal. 
Watch closely for changes in your health, and be sure to contact your doctor if you have any problems. Where can you learn more? Go to http://rosalio-emely.info/. Enter N454 in the search box to learn more about \"Counting Your Baby's Kicks: Care Instructions. \" Current as of: March 16, 2017 Content Version: 11.3 © 0975-7827 [employer]. Care instructions adapted under license by [employer] (which disclaims liability or warranty for this information). If you have questions about a medical condition or this instruction, always ask your healthcare professional. [place] [address] any warranty or liability for your use of this information. Learning About When to Call Your Doctor During Pregnancy (After 20 Weeks) Your Care Instructions It's common to have concerns about what might be a problem during pregnancy. Although most pregnant women don't have any serious problems, it's important to know when to call your doctor if you have certain symptoms or signs of labor. These are general suggestions. Your doctor may give you some more information about when to call. When to call your doctor (after 20 weeks) Call 911 anytime you think you may need emergency care. For example, call if: 
· You have severe vaginal bleeding. · You have sudden, severe pain in your belly. · You passed out (lost consciousness). · You have a seizure. · You see or feel the umbilical cord. · You think you are about to deliver your baby and can't make it safely to the hospital. 
Call your doctor now or seek immediate medical care if: 
· You have vaginal bleeding. · You have belly pain. · You have a fever. · You have symptoms of preeclampsia, such as: 
¨ Sudden swelling of your face, hands, or feet. ¨ New vision problems (such as dimness or blurring). ¨ A severe headache. · You have a sudden release of fluid from your vagina. (You think your water broke.) · You think that you may be in labor. This means that you've had at least 4 contractions within 20 minutes or at least 8 contractions in an hour. · You notice that your baby has stopped moving or is moving much less than normal. 
· You have symptoms of a urinary tract infection. These may include: 
¨ Pain or burning when you urinate. ¨ A frequent need to urinate without being able to pass much urine. ¨ Pain in the flank, which is just below the rib cage and above the waist on either side of the back. ¨ Blood in your urine. Watch closely for changes in your health, and be sure to contact your doctor if: 
· You have vaginal discharge that smells bad. · You have skin changes, such as: ¨ A rash. ¨ Itching. ¨ Yellow color to your skin. · You have other concerns about your pregnancy. If you have labor signs at 37 weeks or more If you have signs of labor at 37 weeks or more, your doctor may tell you to call when your labor becomes more active. Symptoms of active labor include: 
· Contractions that are regular. · Contractions that are less than 5 minutes apart. · Contractions that are hard to talk through. Follow-up care is a key part of your treatment and safety. Be sure to make and go to all appointments, and call your doctor if you are having problems.  It's also a good idea to know your test results and keep a list of the medicines you take. Where can you learn more? Go to http://rosalio-emely.info/. Enter  in the search box to learn more about \"Learning About When to Call Your Doctor During Pregnancy (After 20 Weeks). \" 
Current as of: March 16, 2017 Content Version: 11.3 © 5398-3801 [employer]. Care instructions adapted under license by [employer] (which disclaims liability or warranty for this information). If you have questions about a medical condition or this instruction, always ask your healthcare professional. [address] any warranty or liability for your use of this information. Discharge Orders None Introducing [facility]! Dear Keo Ramirez: Thank you for requesting a Codility account. Our records indicate that you already have an active Codility account. You can access your account anytime at [email or web address] Did you know that you can access your hospital and ER discharge instructions at any time in Codility? You can also review all of your test results from your hospital stay or ER visit. Additional Information If you have questions, please visit the Frequently Asked Questions section of the Codility website at [email or web address] Remember, Codility is NOT to be used for urgent needs. For medical emergencies, dial 911. Now available from your iPhone and Android! General Information Please provide this summary of care documentation to your next provider. Patient Signature:  ____________________________________________________________ Date:  ____________________________________________________________  
  
Nivia Alfonso Provider Signature:  ____________________________________________________________ Date:  ____________________________________________________________

## 2017-07-17 NOTE — PROGRESS NOTES
Ferning, pooling, nitrazine negative. NATALIYA pocket: 4.0 cm  Pt. Ok to discharge home for office follow-up with Bandar Glover.

## 2017-07-17 NOTE — PROGRESS NOTES
1118 Patient is a  at 33.5 weeks who arrives to unit with complaints of leaking fluid. Urine sample collected, patient taken to ld triage 3.     1125 +FM, denies vaginal bleeding, or intercourse in the last 24 hours. Patient states she started seeing watery/mucousy clear fluid in her underwear about 1045 this morning. Patient denies pain or contractions. 1142 nitrazine equivocal, gentle sve performed, FT/ very posterior. 1150 Dr. Davide Monroy paged. [address] performed by Dr. Davide Monroy, good fluid noted, sterile speculum exam performed, No pooling noted, nitrazine negative. 5 FHT, hx, and VS  reviewed by Davide Glover, EFM may be removed, discharge order received, patient is to follow-up with her primary OB this week. Patient verbalizes understanding. 1220 discharge instructions reviewed, patient verbalizes understanding. 1225 patient and S.O. Ambulate off unit at this time.

## 2017-07-17 NOTE — IP AVS SNAPSHOT
Summary of Care Report The Summary of Care report has been created to help improve care coordination. Users with access to [employer] or [address] (Web-based application) may access additional patient information including the Discharge Summary. If you are not currently a [address] user and need more information, please call the number listed below in the [place] [address] section and ask to be connected with Medical Records. Facility Information Name Address Phone [address] [place] [id number] [phone] Patient Information Patient Name Sex  Js Odom ([id number]) Female 1995 Discharge Information Admitting Provider Service Area Unit Lauralyn Bamberger, MD / [address] [facility] [address] Triage / [phone] Discharge Provider Discharge Date/Time Discharge Disposition Destination (none) 2017 Midday (Pending) AHR (none) Patient Language Language ENGLISH [13] Hospital Problems as of 2017  Reviewed: 2017  5:58 PM by Rosalinda Davey DO None Non-Hospital Problems as of 2017  Reviewed: 2017  5:58 PM by Rosalinda Davey DO Class Noted - Resolved Last Modified Active Problems Thrombocytopenia affecting pregnancy ([facility] [address]  2017 - Present 2017 by Rosalinda Davey DO Entered by Rosalinda Davey DO Rh negative state in antepartum period  2017 - Present 2017 by Rosalinda Davey DO Entered by Rosalinda Davey DO  
  IUGR (intrauterine growth restriction) affecting care of mother  2017 - Present 2017 by Rosalinda Davey DO Entered by Rosalinda Davey DO  
  Pregnancy  2017 - Present 2017 by Venessa Huitron MD  
  Entered by Venessa Huitron MD  
  
You are allergic to the following Allergen Reactions Tramadol Itching Current Discharge Medication List  
  
ASK your doctor about these medications Dose & Instructions Dispensing Information Comments  
 pnv w/o calcium-iron fum-fa 27-1 mg Tab Take  by mouth. Refills:  0 Current Immunizations Name Date Rho(D) Immune Globulin - IM 2017 Tdap 2017 Follow-up Information Follow up With Details Comments Contact Info None   None ([phone]) Patient stated that they have no PCP Discharge Instructions Please follow up with Dr. Reagan Parker this week. Weeks 34 to 36 of Your Pregnancy: Care Instructions Your Care Instructions By now, your baby and your belly have grown quite large. It is almost time to give birth. A full-term pregnancy can deliver between 37 and 42 weeks. Your baby's lungs are almost ready to breathe air. The bones in your baby's head are now firm enough to protect it, but soft enough to move down through the birth canal. 
You may feel excited, happy, anxious, or scared. You may wonder how you will know if you are in labor or what to expect during labor. Try to be flexible in your expectations of the birth. Because each birth is different, there is no way to know exactly what childbirth will be like for you. This care sheet will help you know what to expect and how to prepare. This may make your childbirth easier. If you haven't already had the Tdap shot during this pregnancy, talk to your doctor about getting it. It will help protect your  against pertussis infection. In the 36th week, most women have a test for group B streptococcus (GBS). GBS is a common bacteria that can live in the vagina and rectum. It can make your baby sick after birth. If you test positive, you will get antibiotics during labor. The medicine will keep your baby from getting the bacteria. Follow-up care is a key part of your treatment and safety.  Be sure to make and go to all appointments, and call your doctor if you are having problems. It's also a good idea to know your test results and keep a list of the medicines you take. How can you care for yourself at home? Learn about pain relief choices · Pain is different for every woman. Talk with your doctor about your feelings about pain. · You can choose from several types of pain relief. These include medicine or breathing techniques, as well as comfort measures. You can use more than one option. · If you choose to have pain medicine during labor, talk to your doctor about your options. Some medicines lower anxiety and help with some of the pain. Others make your lower body numb so that you won't feel pain. · Be sure to tell your doctor about your pain medicine choice before you start labor or very early in your labor. You may be able to change your mind as labor progresses. · Rarely, a woman is put to sleep by medicine given through a mask or an IV. Labor and delivery · The first stage of labor has three parts: early, active, and transition. ¨ Most women have early labor at home. You can stay busy or rest, eat light snacks, drink clear fluids, and start counting contractions. ¨ When talking during a contraction gets hard, you may be moving to active labor. During active labor, you should head for the hospital if you are not there already. ¨ You are in active labor when contractions come every 3 to 4 minutes and last about 60 seconds. Your cervix is opening more rapidly. ¨ If your water breaks, contractions will come faster and stronger. ¨ During transition, your cervix is stretching, and contractions are coming more rapidly. ¨ You may want to push, but your cervix might not be ready. Your doctor will tell you when to push. · The second stage starts when your cervix is completely opened and you are ready to push.  
¨ Contractions are very strong to push the baby down the birth canal. 
 ¨ You will feel the urge to push. You may feel like you need to have a bowel movement. ¨ You may be coached to push with contractions. These contractions will be very strong, but you will not have them as often. You can get a little rest between contractions. ¨ You may be emotional and irritable. You may not be aware of what is going on around you. ¨ One last push, and your baby is born. · The third stage is when a few more contractions push out the placenta. This may take 30 minutes or less. · The fourth stage is the welcome recovery. You may feel overwhelmed with emotions and exhausted but alert. This is a good time to start breastfeeding. Where can you learn more? Go to http://rosalio-emely.info/. Enter B224 in the search box to learn more about \"Weeks 34 to 36 of Your Pregnancy: Care Instructions. \" Current as of: March 16, 2017 Content Version: 11.3 © 4723-0283 Macaw. Care instructions adapted under license by [employer] (which disclaims liability or warranty for this information). If you have questions about a medical condition or this instruction, always ask your healthcare professional. [place] [address] any warranty or liability for your use of this information. Counting Your Baby's Kicks: Care Instructions Your Care Instructions Counting your baby's kicks is one way your doctor can tell that your baby is healthy. Most womenespecially in a first pregnancyfeel their baby move for the first time between 16 and 22 weeks. The movement may feel like flutters rather than kicks. Your baby may move more at certain times of the day. When you are active, you may notice less kicking than when you are resting. At your prenatal visits, your doctor will ask whether the baby is active. In your last trimester, your doctor may ask you to count the number of times you feel your baby move. Follow-up care is a key part of your treatment and safety. Be sure to make and go to all appointments, and call your doctor if you are having problems. It's also a good idea to know your test results and keep a list of the medicines you take. How do you count fetal kicks? · A common method of checking your baby's movement is to count the number of kicks or moves you feel in 1 hour. Ten movements (such as kicks, flutters, or rolls) in 1 hour are normal. Some doctors suggest that you count in the morning until you get to 10 movements. Then you can quit for that day and start again the next day. · Pick your baby's most active time of day to count. This may be any time from morning to evening. · If you do not feel 10 movements in an hour, your baby may be sleeping. Wait for the next hour and count again. When should you call for help? Call your doctor now or seek immediate medical care if: 
· You noticed that your baby has stopped moving or is moving much less than normal. 
Watch closely for changes in your health, and be sure to contact your doctor if you have any problems. Where can you learn more? Go to http://rosalio-emely.info/. Enter E947 in the search box to learn more about \"Counting Your Baby's Kicks: Care Instructions. \" Current as of: March 16, 2017 Content Version: 11.3 © 5595-9043 Arithmatica. Care instructions adapted under license by [employer]ord (which disclaims liability or warranty for this information). If you have questions about a medical condition or this instruction, always ask your healthcare professional. [place] [address] any warranty or liability for your use of this information. Learning About When to Call Your Doctor During Pregnancy (After 20 Weeks) Your Care Instructions It's common to have concerns about what might be a problem during pregnancy.  Although most pregnant women don't have any serious problems, it's important to know when to call your doctor if you have certain symptoms or signs of labor. These are general suggestions. Your doctor may give you some more information about when to call. When to call your doctor (after 20 weeks) Call 911 anytime you think you may need emergency care. For example, call if: 
· You have severe vaginal bleeding. · You have sudden, severe pain in your belly. · You passed out (lost consciousness). · You have a seizure. · You see or feel the umbilical cord. · You think you are about to deliver your baby and can't make it safely to the hospital. 
Call your doctor now or seek immediate medical care if: 
· You have vaginal bleeding. · You have belly pain. · You have a fever. · You have symptoms of preeclampsia, such as: 
¨ Sudden swelling of your face, hands, or feet. ¨ New vision problems (such as dimness or blurring). ¨ A severe headache. · You have a sudden release of fluid from your vagina. (You think your water broke.) · You think that you may be in labor. This means that you've had at least 4 contractions within 20 minutes or at least 8 contractions in an hour. · You notice that your baby has stopped moving or is moving much less than normal. 
· You have symptoms of a urinary tract infection. These may include: 
¨ Pain or burning when you urinate. ¨ A frequent need to urinate without being able to pass much urine. ¨ Pain in the flank, which is just below the rib cage and above the waist on either side of the back. ¨ Blood in your urine. Watch closely for changes in your health, and be sure to contact your doctor if: 
· You have vaginal discharge that smells bad. · You have skin changes, such as: ¨ A rash. ¨ Itching. ¨ Yellow color to your skin. · You have other concerns about your pregnancy. If you have labor signs at 37 weeks or more If you have signs of labor at 37 weeks or more, your doctor may tell you to call when your labor becomes more active. Symptoms of active labor include: 
· Contractions that are regular. · Contractions that are less than 5 minutes apart. · Contractions that are hard to talk through. Follow-up care is a key part of your treatment and safety. Be sure to make and go to all appointments, and call your doctor if you are having problems. It's also a good idea to know your test results and keep a list of the medicines you take. Where can you learn more? Go to http://rosalio-emely.info/. Enter  in the search box to learn more about \"Learning About When to Call Your Doctor During Pregnancy (After 20 Weeks). \" 
Current as of: March 16, 2017 Content Version: 11.3 © 4786-7107 [employer]. Care instructions adapted under license by [employer] (which disclaims liability or warranty for this information). If you have questions about a medical condition or this instruction, always ask your healthcare professional. [place] [address] any warranty or liability for your use of this information. Chart Review Routing History No Routing History on File

## 2017-07-21 ENCOUNTER — ROUTINE PRENATAL (OUTPATIENT)
Dept: OBGYN CLINIC | Age: 22
End: 2017-07-21

## 2017-07-21 ENCOUNTER — HOSPITAL ENCOUNTER (OUTPATIENT)
Age: 22
Discharge: HOME | End: 2017-07-21
Attending: OBSTETRICS & GYNECOLOGY | Admitting: OBSTETRICS & GYNECOLOGY
Payer: MEDICAID

## 2017-07-21 VITALS
BODY MASS INDEX: 38.07 KG/M2 | WEIGHT: 223 LBS | HEIGHT: 64 IN | DIASTOLIC BLOOD PRESSURE: 74 MMHG | HEART RATE: 90 BPM | SYSTOLIC BLOOD PRESSURE: 106 MMHG

## 2017-07-21 VITALS — HEART RATE: 92 BPM | DIASTOLIC BLOOD PRESSURE: 88 MMHG | SYSTOLIC BLOOD PRESSURE: 114 MMHG

## 2017-07-21 DIAGNOSIS — Z34.82 PRENATAL CARE, SUBSEQUENT PREGNANCY, SECOND TRIMESTER: Primary | ICD-10-CM

## 2017-07-21 PROCEDURE — 99282 EMERGENCY DEPT VISIT SF MDM: CPT

## 2017-07-21 PROCEDURE — 59025 FETAL NON-STRESS TEST: CPT

## 2017-07-21 NOTE — MR AVS SNAPSHOT
Visit Information Date & Time Provider Department Dept. Phone Encounter #  
 7/21/2017  9:45 AM Ally Callejas MD [facility] [phone] [id number] Follow-up Instructions Return in about 2 weeks (around 8/4/2017). Your Appointments 7/27/2017  1:30 PM  
Follow Up with Liza Shafer MD  
[address] [facility] Appt Note: 3 week f-up [address]  
  
   
 [address]  
  
    
 8/4/2017 10:45 AM  
OB VISIT with Carson Lamas [facility] ([facility] Appt Note: ob  
 [address] [phone]  
[phone]  
  
   
 [phone] Upcoming Health Maintenance Date Due  
 HPV AGE 9Y-34Y (1 of 3 - Female 3 Dose Series) 9/22/2006 INFLUENZA AGE 9 TO ADULT 8/1/2017 PAP AKA CERVICAL CYTOLOGY 2/9/2020 Allergies as of 7/21/2017  Review Complete On: 7/17/2017 By: Martha Montgomery RN Severity Noted Reaction Type Reactions Tramadol  09/19/2016    Itching Current Immunizations  Never Reviewed Name Date Rho(D) Immune Globulin - IM 2/18/2017 12:36 PM  
 Tdap 6/6/2017 Not reviewed this visit Vitals BP Pulse Height(growth percentile) Weight(growth percentile) LMP BMI  
 106/74 90 5' 4\" (1.626 m) 223 lb (101.2 kg) 11/23/2016 38.28 kg/m2 OB Status Smoking Status Pregnant Never Smoker BMI and BSA Data Body Mass Index Body Surface Area  
 38.28 kg/m 2 2.14 m 2 Preferred Pharmacy Pharmacy Name Phone [place], [address] [facility] [address] [phone] Your Updated Medication List  
  
   
This list is accurate as of: 7/21/17 10:29 AM.  Always use your most recent med list.  
  
  
  
  
 pnv w/o calcium-iron fum-fa 27-1 mg Tab Take  by mouth. Follow-up Instructions Return in about 2 weeks (around 2017). Patient Instructions Weeks 34 to 36 of Your Pregnancy: Care Instructions Your Care Instructions By now, your baby and your belly have grown quite large. It is almost time to give birth. A full-term pregnancy can deliver between 37 and 42 weeks. Your baby's lungs are almost ready to breathe air. The bones in your baby's head are now firm enough to protect it, but soft enough to move down through the birth canal. 
You may feel excited, happy, anxious, or scared. You may wonder how you will know if you are in labor or what to expect during labor. Try to be flexible in your expectations of the birth. Because each birth is different, there is no way to know exactly what childbirth will be like for you. This care sheet will help you know what to expect and how to prepare. This may make your childbirth easier. If you haven't already had the Tdap shot during this pregnancy, talk to your doctor about getting it. It will help protect your  against pertussis infection. In the 36th week, most women have a test for group B streptococcus (GBS). GBS is a common bacteria that can live in the vagina and rectum. It can make your baby sick after birth. If you test positive, you will get antibiotics during labor. The medicine will keep your baby from getting the bacteria. Follow-up care is a key part of your treatment and safety. Be sure to make and go to all appointments, and call your doctor if you are having problems. It's also a good idea to know your test results and keep a list of the medicines you take. How can you care for yourself at home? Learn about pain relief choices · Pain is different for every woman. Talk with your doctor about your feelings about pain. · You can choose from several types of pain relief. These include medicine or breathing techniques, as well as comfort measures.  You can use more than one option. · If you choose to have pain medicine during labor, talk to your doctor about your options. Some medicines lower anxiety and help with some of the pain. Others make your lower body numb so that you won't feel pain. · Be sure to tell your doctor about your pain medicine choice before you start labor or very early in your labor. You may be able to change your mind as labor progresses. · Rarely, a woman is put to sleep by medicine given through a mask or an IV. Labor and delivery · The first stage of labor has three parts: early, active, and transition. ¨ Most women have early labor at home. You can stay busy or rest, eat light snacks, drink clear fluids, and start counting contractions. ¨ When talking during a contraction gets hard, you may be moving to active labor. During active labor, you should head for the hospital if you are not there already. ¨ You are in active labor when contractions come every 3 to 4 minutes and last about 60 seconds. Your cervix is opening more rapidly. ¨ If your water breaks, contractions will come faster and stronger. ¨ During transition, your cervix is stretching, and contractions are coming more rapidly. ¨ You may want to push, but your cervix might not be ready. Your doctor will tell you when to push. · The second stage starts when your cervix is completely opened and you are ready to push. ¨ Contractions are very strong to push the baby down the birth canal. 
¨ You will feel the urge to push. You may feel like you need to have a bowel movement. ¨ You may be coached to push with contractions. These contractions will be very strong, but you will not have them as often. You can get a little rest between contractions. ¨ You may be emotional and irritable. You may not be aware of what is going on around you. ¨ One last push, and your baby is born. · The third stage is when a few more contractions push out the placenta. This may take 30 minutes or less. · The fourth stage is the welcome recovery. You may feel overwhelmed with emotions and exhausted but alert. This is a good time to start breastfeeding. Where can you learn more? Go to http://rosalio-emely.info/. Enter B016 in the search box to learn more about \"Weeks 34 to 36 of Your Pregnancy: Care Instructions. \" Current as of: March 16, 2017 Content Version: 11.3 © 4368-2892 Opeepl. Care instructions adapted under license by PriceMatch (which disclaims liability or warranty for this information). If you have questions about a medical condition or this instruction, always ask your healthcare professional. [address] any warranty or liability for your use of this information. Introducing \[facility]\"" & HEALTH SERVICES! Dear Oliver Petersen: Thank you for requesting a [employer] account. Our records indicate that you already have an active [employer] account. You can access your account anytime at [email or web address] Did you know that you can access your hospital and ER discharge instructions at any time in [employer]? You can also review all of your test results from your hospital stay or ER visit. Additional Information If you have questions, please visit the Frequently Asked Questions section of the [employer] website at [email or web address] Remember, [employer] is NOT to be used for urgent needs. For medical emergencies, dial 911. Now available from your iPhone and Android! Please provide this summary of care documentation to your next provider. Your primary care clinician is listed as NONE. If you have any questions after today's visit, please call [phone].

## 2017-07-21 NOTE — PROGRESS NOTES
LABOR AND DELIVERY TRIAGE- Non stress test    Patient presents to L&D Triage for NST  Indication: IUGR  Presenting symptoms and initial assessment discussed with RN caring for the patient. Remote EFM reviewed:  Reactive. Effort:  Q4 contractions- PT UNAWARE.  SVE: 0.5 cm/thick/posterior    A/P:  NST reactive  Reassuring fetal status.   Disposition: home   labor precautions      Linette Livingston MD  2017  11:16 AM

## 2017-07-21 NOTE — PROGRESS NOTES
Pt to LD for NST for IUGR, applied toco and efm.  1105 Dr Eli Kingsley called about contractions and reactive tracing. SVE done ft-1 cm very posterior and very high, thick and firm. Pt denies feeling any contractions. Labor precautions discussed with patient. 1119 discharge teaching done next nst scheduled for Monday at 1800.

## 2017-07-21 NOTE — IP AVS SNAPSHOT
[address] Patient: Latonia Martinez MRN: [id number] :1995 You are allergic to the following Allergen Reactions Tramadol Itching Recent Documentation OB Status Smoking Status Pregnant Never Smoker Emergency Contacts Name Discharge Info Relation Home Work Mobile Ariana Alcaraz CAREGIVER [3] Mother [14] [phone] About your hospitalization You were admitted on:  2017 You last received care in the:  [address] [place] [address] You were discharged on:  2017 Unit phone number:  [phone] Why you were hospitalized Your primary diagnosis was:  Not on File Your diagnoses also included:  Pregnancy Providers Seen During Your Hospitalizations Provider Role Specialty Primary office phone Kane Morelos MD Attending Provider Obstetrics & Gynecology [phone] Your Primary Care Physician (PCP) Primary Care Physician Office Phone Office Fax NONE ** None ** ** None ** Follow-up Information Follow up With Details Comments Contact Info Kane Morelos MD  twice weekly NST [address] [address] [phone] 
[phone] Your Appointments   1:30 PM EDT Follow Up with Cassandra Cooper MD  
[address] [place] [address] Oncology [address] [facility]) [address] [facility] [address] [phone]  
[phone] 2017 10:45 AM EDT  
OB VISIT with Vivian Don DO  
[facility] OB/GYN ([address] [facility]) [address] [phone] Current Discharge Medication List  
  
ASK your doctor about these medications Dose & Instructions Dispensing Information Comments Morning Noon Evening Bedtime pnv w/o calcium-iron fum-fa 27-1 mg Tab Your last dose was: Your next dose is: Take  by mouth. Refills:  0 Discharge Instructions None Discharge Instructions Attachments/References PREGNANCY: PRECAUTIONS (ENGLISH) Discharge Orders None Introducing [facility]! Dear Rhonda Herman: Thank you for requesting a CapableBits account. Our records indicate that you already have an active CapableBits account. You can access your account anytime at [email or web address] Did you know that you can access your hospital and ER discharge instructions at any time in CapableBits? You can also review all of your test results from your hospital stay or ER visit. Additional Information If you have questions, please visit the Frequently Asked Questions section of the CapableBits website at [email or web address] Remember, CapableBits is NOT to be used for urgent needs. For medical emergencies, dial 911. Now available from your iPhone and Android! General Information Please provide this summary of care documentation to your next provider. Patient Signature:  ____________________________________________________________ Date:  ____________________________________________________________  
  
Bety [place] Provider Signature:  ____________________________________________________________ Date:  ____________________________________________________________ More Information Pregnancy Precautions: Care Instructions Your Care Instructions There is no sure way to prevent labor before your due date ( labor) or to prevent most other pregnancy problems. But there are things you can do to increase your chances of a healthy pregnancy. Go to your appointments, follow your doctor's advice, and take good care of yourself. Eat well, and exercise (if your doctor agrees). And make sure to drink plenty of water. Follow-up care is a key part of your treatment and safety. Be sure to make and go to all appointments, and call your doctor if you are having problems. It's also a good idea to know your test results and keep a list of the medicines you take. How can you care for yourself at home? · Make sure you go to your prenatal appointments. At each visit, your doctor will check your blood pressure. Your doctor will also check to see if you have protein in your urine. High blood pressure and protein in urine are signs of preeclampsia. This condition can be dangerous for you and your baby. · Drink plenty of fluids, enough so that your urine is light yellow or clear like water. Dehydration can cause contractions. If you have kidney, heart, or liver disease and have to limit fluids, talk with your doctor before you increase the amount of fluids you drink. · Tell your doctor right away if you notice any symptoms of an infection, such as: ¨ Burning when you urinate. ¨ A foul-smelling discharge from your vagina. ¨ Vaginal itching. ¨ Unexplained fever. ¨ Unusual pain or soreness in your uterus or lower belly. · Eat a balanced diet. Include plenty of foods that are high in calcium and iron. ¨ Foods high in calcium include milk, cheese, yogurt, almonds, and broccoli. ¨ Foods high in iron include red meat, shellfish, poultry, eggs, beans, raisins, whole-grain bread, and leafy green vegetables. · Do not smoke. If you need help quitting, talk to your doctor about stop-smoking programs and medicines. These can increase your chances of quitting for good. · Do not drink alcohol or use illegal drugs. · Follow your doctor's directions about activity. Your doctor will let you know how much, if any, exercise you can do. · Ask your doctor if you can have sex. If you are at risk for early labor, your doctor may ask you to not have sex. · Take care to prevent falls. During pregnancy, your joints are loose, and your balance is off. Sports such as bicycling, skiing, or in-line skating can increase your risk of falling. And don't ride horses or motorcycles, dive, water ski, scuba dive, or parachute jump while you are pregnant. · Avoid getting very hot. Do not use saunas or hot tubs. Avoid staying out in the sun in hot weather for long periods. Take acetaminophen (Tylenol) to lower a high fever. · Do not take any over-the-counter or herbal medicines or supplements without talking to your doctor or pharmacist first. 
When should you call for help? Call 911 anytime you think you may need emergency care. For example, call if: 
· You passed out (lost consciousness). · You have severe vaginal bleeding. · You have severe pain in your belly or pelvis. · You have had fluid gushing or leaking from your vagina and you know or think the umbilical cord is bulging into your vagina. If this happens, immediately get down on your knees so your rear end (buttocks) is higher than your head. This will decrease the pressure on the cord until help arrives. Call your doctor now or seek immediate medical care if: 
· You have signs of preeclampsia, such as: 
¨ Sudden swelling of your face, hands, or feet. ¨ New vision problems (such as dimness or blurring). ¨ A severe headache. · You have any vaginal bleeding. · You have belly pain or cramping. · You have a fever. · You have had regular contractions (with or without pain) for an hour. This means that you have 8 or more within 1 hour or 4 or more in 20 minutes after you change your position and drink fluids. · You have a sudden release of fluid from your vagina. · You have low back pain or pelvic pressure that does not go away.  
· You notice that your baby has stopped moving or is moving much less than normal. 
Watch closely for changes in your health, and be sure to contact your doctor if you have any problems. Where can you learn more? Go to http://rosalio-emely.info/. Enter [id number] in the search box to learn more about \"Pregnancy Precautions: Care Instructions. \" Current as of: March 16, 2017 Content Version: 11.3 © 0594-5409 [employer]. Care instructions adapted under license by [employer] (which disclaims liability or warranty for this information). If you have questions about a medical condition or this instruction, always ask your healthcare professional. [address] any warranty or liability for your use of this information.

## 2017-07-21 NOTE — PATIENT INSTRUCTIONS

## 2017-07-21 NOTE — IP AVS SNAPSHOT
[place] [address] Patient: Mera Waters MRN: [id number] :1995 Current Discharge Medication List  
  
ASK your doctor about these medications Dose & Instructions Dispensing Information Comments Morning Noon Evening Bedtime  
 pnv w/o calcium-iron fum-fa 27-1 mg Tab Your last dose was: Your next dose is: Take  by mouth. Refills:  0

## 2017-07-21 NOTE — PROGRESS NOTES
34w2d.  No OB issues. Denies LOF/VB  Noted IUGR- has [facility] appt today. Continue twice weekly NSTs- next NST today. RTO 2 weeks.

## 2017-07-27 ENCOUNTER — HOSPITAL ENCOUNTER (OUTPATIENT)
Dept: INFUSION THERAPY | Age: 22
Discharge: HOME | End: 2017-07-27
Payer: MEDICAID

## 2017-07-27 ENCOUNTER — OFFICE VISIT (OUTPATIENT)
Dept: ONCOLOGY | Age: 22
End: 2017-07-27

## 2017-07-27 VITALS
RESPIRATION RATE: 14 BRPM | HEART RATE: 88 BPM | SYSTOLIC BLOOD PRESSURE: 104 MMHG | TEMPERATURE: 98 F | DIASTOLIC BLOOD PRESSURE: 61 MMHG

## 2017-07-27 VITALS
HEART RATE: 88 BPM | TEMPERATURE: 98 F | WEIGHT: 224 LBS | OXYGEN SATURATION: 100 % | HEIGHT: 64 IN | SYSTOLIC BLOOD PRESSURE: 104 MMHG | BODY MASS INDEX: 38.24 KG/M2 | RESPIRATION RATE: 14 BRPM | DIASTOLIC BLOOD PRESSURE: 61 MMHG

## 2017-07-27 DIAGNOSIS — Z67.91 RH NEGATIVE STATE IN ANTEPARTUM PERIOD, SECOND TRIMESTER: ICD-10-CM

## 2017-07-27 DIAGNOSIS — O26.892 RH NEGATIVE STATE IN ANTEPARTUM PERIOD, SECOND TRIMESTER: ICD-10-CM

## 2017-07-27 DIAGNOSIS — D69.6 THROMBOCYTOPENIA AFFECTING PREGNANCY (HCC): Primary | ICD-10-CM

## 2017-07-27 DIAGNOSIS — Z3A.36 36 WEEKS GESTATION OF PREGNANCY: ICD-10-CM

## 2017-07-27 DIAGNOSIS — D69.6 THROMBOCYTOPENIA AFFECTING PREGNANCY (HCC): ICD-10-CM

## 2017-07-27 DIAGNOSIS — O99.119 THROMBOCYTOPENIA AFFECTING PREGNANCY (HCC): ICD-10-CM

## 2017-07-27 DIAGNOSIS — O99.119 THROMBOCYTOPENIA AFFECTING PREGNANCY (HCC): Primary | ICD-10-CM

## 2017-07-27 LAB
BASO+EOS+MONOS # BLD AUTO: 0.3 K/UL (ref 0–2.3)
BASO+EOS+MONOS # BLD AUTO: 3 % (ref 0.1–17)
DIFFERENTIAL METHOD BLD: NORMAL
ERYTHROCYTE [DISTWIDTH] IN BLOOD BY AUTOMATED COUNT: 12.7 % (ref 11.5–14.5)
HCT VFR BLD AUTO: 37.9 % (ref 36–48)
HGB BLD-MCNC: 12.6 G/DL (ref 12–16)
LYMPHOCYTES # BLD AUTO: 28 % (ref 14–44)
LYMPHOCYTES # BLD: 2.5 K/UL (ref 1.1–5.9)
MCH RBC QN AUTO: 29.8 PG (ref 25–35)
MCHC RBC AUTO-ENTMCNC: 33.2 G/DL (ref 31–37)
MCV RBC AUTO: 89.6 FL (ref 78–102)
NEUTS SEG # BLD: 6.3 K/UL (ref 1.8–9.5)
NEUTS SEG NFR BLD AUTO: 70 % (ref 40–70)
PLATELET # BLD AUTO: 92 K/UL (ref 135–420)
RBC # BLD AUTO: 4.23 M/UL (ref 4.1–5.1)
WBC # BLD AUTO: 9.1 K/UL (ref 4.5–13)

## 2017-07-27 PROCEDURE — 36415 COLL VENOUS BLD VENIPUNCTURE: CPT

## 2017-07-27 PROCEDURE — 85025 COMPLETE CBC W/AUTO DIFF WBC: CPT | Performed by: INTERNAL MEDICINE

## 2017-07-27 NOTE — PROGRESS NOTES
[facility] OPIC Progress Note    Date: 2017    Name: Jes Saba    MRN: [id number]         : 1995    Ms. Conte arrived at [facility]. Ms. Tj Epps was assessed and education was provided. Ms. Conte's vitals were reviewed and patient was observed for 5 minutes prior to treatment. Visit Vitals    /61 (BP 1 Location: Right arm, BP Patient Position: Sitting)    Pulse 88    Temp 98 °F (36.7 °C)    Resp 14       Lab results were obtained and reviewed. Recent Results (from the past 12 hour(s))   CBC WITH 3 PART DIFF    Collection Time: 17  2:58 PM   Result Value Ref Range    WBC 9.1 4.5 - 13.0 K/uL    RBC 4.23 4. 10 - 5.10 M/uL    HGB 12.6 12.0 - 16.0 g/dL    HCT 37.9 36 - 48 %    MCV 89.6 78 - 102 FL    MCH 29.8 25.0 - 35.0 PG    MCHC 33.2 31 - 37 g/dL    RDW 12.7 11.5 - 14.5 %    NEUTROPHILS 70 40 - 70 %    MIXED CELLS 3 0.1 - 17 %    LYMPHOCYTES 28 14 - 44 %    ABS. NEUTROPHILS 6.3 1.8 - 9.5 K/UL    ABS. MIXED CELLS 0.3 0.0 - 2.3 K/uL    ABS. LYMPHOCYTES 2.5 1.1 - 5.9 K/UL    DF AUTOMATED     PLATELET    Collection Time: 17  2:58 PM   Result Value Ref Range    PLATELET 92 (L) 602 - 420 K/uL     Venipuncture to Left antecubital vein x1 attempt using 23g butterfly collection set. Gauze and coban applied to site. Ms. Tj Epps tolerated the infusion, and had no complaints. Patient armband removed and shredded. Ms. Tj Epps was discharged from [place] [address] in stable condition at 1500. She is to follow up with physician for her next appointment.     Liborio Mabry RN  2017  4:34 PM

## 2017-07-27 NOTE — PROGRESS NOTES
[facility] HEMATOLOGY-MEDICAL ONCOLOGY     Patient Active Problem List   Diagnosis Code    Thrombocytopenia affecting pregnancy ([facility] [address] O99.119, D69.6    Rh negative state in antepartum period O09.899    IUGR (intrauterine growth restriction) affecting care of mother O41.80    Pregnancy Z33.1    IUP (intrauterine pregnancy), incidental Z33.1         Assessment/ Plan:     1.) Thrombocytopenia of Pregnancy    -Currently 35 weeks gestation with her second pregnancy  -Platelet count today pending  -No previous history of low platelet count, splenomegaly, Hepatitis, or HIV  -RTC. in 2 weeks for a repeat CBC or sooner if necessary      2.) Previous Miscarriage  -APL-Antibodies and Lupus anticoag. assay were negative        Thank you for the opportunity to participate in the care, please do not hesitate to call for any questions or concerns. I have discussed the diagnosis with the patient and the intended plan. The patient verbalized understanding and agrees with the plan. History:   Sarita Valera is a 24 y.o. female returning today for: Thrombocytopenia of Pregnancy  -Mild ITP vs. Thrombocytopenia of pregnancy  -No previous history of low platelet count, splenomegaly, Hepatitis, or HIV      She denies fever, chills, chest pain, shortness breath, hemoptysis, hematochezia, melena, hematuria, dysuria, paresthesia, weakness, numbness, and/or loss of consciousness. She denies any new or worsening bony pain. She does complain of increased urinary frequency but no burning. She also complains of some increased fatigue. No bleeding or bruising. A 12 point review of systems was otherwise negative. Current Outpatient Prescriptions   Medication Sig Dispense Refill    pnv w/o calcium-iron fum-fa 27-1 mg tab Take  by mouth.          Objective:   VS:    Visit Vitals    /61 (BP 1 Location: Right arm, BP Patient Position: Sitting)    Pulse 88    Temp 98 °F (36.7 °C) (Oral)    Resp 14    Ht 5' 4\" (1.626 m)    Wt 101.6 kg (224 lb)    LMP 11/23/2016    SpO2 100%    BMI 38.45 kg/m2        Physical Exam:       Constitutional:  no acute distress, alert and oriented x 3    HENT:  atraumatic    Eyes:  EOMI, PERRLA    Neck:  No masses    Cardiovascular:  S1, S2    Pulmonary/Chest Wall:  breath sounds are clear bilaterally   Abdominal:  Non tender, no palpable masses       Musculoskeletal:  No gross deformities    Skin:  Normal and no rashes or lesions    Peripheral Vascular:  Pulses palp. Review of Systems: 12 point review of systems otherwise negative      No results found for this or any previous visit (from the past 168 hour(s)). Past Medical History:   Diagnosis Date    Anxiety     Asthma     Depression     anxiety       Past Surgical History:   Procedure Laterality Date    HX HERNIA REPAIR  childhood       Social History     Social History    Marital status: SINGLE     Spouse name: N/A    Number of children: N/A    Years of education: N/A     Occupational History    Not on file.      Social History Main Topics    Smoking status: Never Smoker    Smokeless tobacco: Never Used    Alcohol use No    Drug use: No    Sexual activity: Yes     Partners: Male     Birth control/ protection: None     Other Topics Concern    Not on file     Social History Narrative       Family History   Problem Relation Age of Onset    No Known Problems Mother     Diabetes Father     No Known Problems Maternal Grandmother     No Known Problems Maternal Grandfather     No Known Problems Paternal Grandmother     No Known Problems Paternal Grandfather        Allergies   Allergen Reactions    Tramadol Itching       Follow-up Disposition: Not on File      Adwoa Zuluaga MD  Hematology-Medical Oncology

## 2017-07-27 NOTE — MR AVS SNAPSHOT
Visit Information Date & Time Provider Department Dept. Phone Encounter #  
 7/27/2017  2:00 PM Padilla Mcelroy MD [facility] Oncology [phone] [id number] Follow-up Instructions Return in about 2 weeks (around 8/10/2017), or if symptoms worsen or fail to improve, for reevaluate. Follow-up and Disposition History Your Appointments 8/4/2017 10:45 AM  
OB VISIT with Master Tenorio DO  
[facility] Appt Note: ob  
 [facility] [phone] [facility] [phone]  
  
    
 8/21/2017 11:30 AM  
Follow Up with Padilla Mcelroy MD  
[facility] Oncology [facility] Appt Note: f-up appt [address] [phone] [facility]  
  
   
 [id number] [place] [address] [address] [place] [address] Upcoming Health Maintenance Date Due  
 HPV AGE 9Y-34Y (1 of 3 - Female 3 Dose Series) 9/22/2006 INFLUENZA AGE 9 TO ADULT 8/1/2017 PAP AKA CERVICAL CYTOLOGY 2/9/2020 DTaP/Tdap/Td series (2 - Td) 6/6/2027 Allergies as of 7/27/2017  Review Complete On: 7/27/2017 By: Padilla Mcelroy MD  
  
 Severity Noted Reaction Type Reactions Tramadol  09/19/2016    Itching Current Immunizations  Never Reviewed Name Date Rho(D) Immune Globulin - IM 2/18/2017 12:36 PM  
 Tdap 6/6/2017 Not reviewed this visit You Were Diagnosed With   
  
 Codes Comments Thrombocytopenia affecting pregnancy ([facility] [address]    -  Primary ICD-10-CM: O99.119, D69.6 ICD-9-CM: 649.30, 287.5 36 weeks gestation of pregnancy     ICD-10-CM: Z3A.36 
ICD-9-CM: V22.2 Rh negative state in antepartum period, second trimester     ICD-10-CM: O09.892 ICD-9-CM: V23.89 Vitals BP Pulse Temp Resp Height(growth percentile) Weight(growth percentile)  104/61 (BP 1 Location: Right arm, BP Patient Position: Sitting) 88 98 °F (36.7 °C) (Oral) 14 5' 4\" (1.626 m) 224 lb (101.6 kg) LMP SpO2 BMI OB Status Smoking Status 11/23/2016 100% 38.45 kg/m2 Pregnant Never Smoker Vitals History BMI and BSA Data Body Mass Index Body Surface Area  
 38.45 kg/m 2 2.14 m 2 Preferred Pharmacy Pharmacy Name Phone [place], [address] [phone] Your Updated Medication List  
  
   
This list is accurate as of: 7/27/17  2:42 PM.  Always use your most recent med list.  
  
  
  
  
 pnv w/o calcium-iron fum-fa 27-1 mg Tab Take  by mouth. Follow-up Instructions Return in about 2 weeks (around 8/10/2017), or if symptoms worsen or fail to improve, for reevaluate. To-Do List   
 07/27/2017 Lab:  CBC WITH AUTOMATED DIFF Introducing [address]! Dear Velvet Inch: Thank you for requesting a Tabulous Cloud account. Our records indicate that you already have an active Tabulous Cloud account. You can access your account anytime at [email or web address] Did you know that you can access your hospital and ER discharge instructions at any time in Tabulous Cloud? You can also review all of your test results from your hospital stay or ER visit. Additional Information If you have questions, please visit the Frequently Asked Questions section of the Tabulous Cloud website at [email or web address] Remember, Tabulous Cloud is NOT to be used for urgent needs. For medical emergencies, dial 911. Now available from your iPhone and Android! Please provide this summary of care documentation to your next provider. Your primary care clinician is listed as NONE. If you have any questions after today's visit, please call [phone].

## 2017-08-04 ENCOUNTER — ROUTINE PRENATAL (OUTPATIENT)
Dept: OBGYN CLINIC | Age: 22
End: 2017-08-04

## 2017-08-04 ENCOUNTER — HOSPITAL ENCOUNTER (OUTPATIENT)
Age: 22
Discharge: HOME | End: 2017-08-04
Attending: OBSTETRICS & GYNECOLOGY | Admitting: OBSTETRICS & GYNECOLOGY
Payer: MEDICAID

## 2017-08-04 VITALS
RESPIRATION RATE: 18 BRPM | HEIGHT: 64 IN | HEART RATE: 80 BPM | WEIGHT: 230 LBS | DIASTOLIC BLOOD PRESSURE: 74 MMHG | BODY MASS INDEX: 39.27 KG/M2 | SYSTOLIC BLOOD PRESSURE: 132 MMHG

## 2017-08-04 VITALS — BODY MASS INDEX: 39.27 KG/M2 | WEIGHT: 230 LBS | HEIGHT: 64 IN

## 2017-08-04 DIAGNOSIS — Z34.83 PRENATAL CARE, SUBSEQUENT PREGNANCY, THIRD TRIMESTER: Primary | ICD-10-CM

## 2017-08-04 DIAGNOSIS — Z3A.36 36 WEEKS GESTATION OF PREGNANCY: ICD-10-CM

## 2017-08-04 LAB — GRBS, EXTERNAL: POSITIVE

## 2017-08-04 PROCEDURE — 59025 FETAL NON-STRESS TEST: CPT

## 2017-08-04 NOTE — IP AVS SNAPSHOT
Sara Fairchild 
 
 
 [address] [place] [address] Patient: Nia Diaz MRN: [id number] :1995 You are allergic to the following Allergen Reactions Tramadol Itching Recent Documentation Height Weight BMI OB Status Smoking Status 1.626 m 104.3 kg 39.48 kg/m2 Pregnant Never Smoker Emergency Contacts Name Discharge Info Relation Home Work Mobile Ade Tomlin CAREGIVER [3] Mother [14] [phone] About your hospitalization You were admitted on:  2017 You last received care in the:  [address] [place] [address] You were discharged on:  2017 Unit phone number:  [phone] Why you were hospitalized Your primary diagnosis was:  Not on File Providers Seen During Your Hospitalizations Provider Role Specialty Primary office phone Kaleigh Sanz DO Attending Provider Obstetrics & Gynecology [phone] Your Primary Care Physician (PCP) Primary Care Physician Office Phone Office Fax NONE ** None ** ** None ** Follow-up Information Follow up With Details Comments Contact Info None   None (395) Patient stated that they have no PCP Your Appointments 2017  3:45 PM EDT  
OB VISIT with Kaleigh Sanz DO  
[facility] [facility] [address] [phone] 2017 11:30 AM EDT Follow Up with Vu Romero MD  
[facility] [address]) [address] [phone]  
[phone] Current Discharge Medication List  
  
ASK your doctor about these medications Dose & Instructions Dispensing Information Comments Morning Noon Evening Bedtime  
 pnv w/o calcium-iron fum-fa 27-1 mg Tab Your last dose was: Your next dose is: Take  by mouth. Refills:  0 Discharge Instructions None Discharge Instructions Attachments/References PREGNANCY: WEEKS 34 TO 36 (ENGLISH) PREGNANCY: PRECAUTIONS (ENGLISH) PREGNANCY: KICK COUNTS (ENGLISH) Discharge Orders None Introducing [facility]! Dear Benjamin Gutierrez: Thank you for requesting a AGC account. Our records indicate that you already have an active AGC account. You can access your account anytime at [email or web address] Did you know that you can access your hospital and ER discharge instructions at any time in AGC? You can also review all of your test results from your hospital stay or ER visit. Additional Information If you have questions, please visit the Frequently Asked Questions section of the AGC website at [email or web address] Remember, AGC is NOT to be used for urgent needs. For medical emergencies, dial 911. Now available from your iPhone and Android! General Information Please provide this summary of care documentation to your next provider. Patient Signature:  ____________________________________________________________ Date:  ____________________________________________________________  
  
Gladys Campbell Provider Signature:  ____________________________________________________________ Date:  ____________________________________________________________ More Information Weeks 34 to 36 of Your Pregnancy: Care Instructions Your Care Instructions By now, your baby and your belly have grown quite large. It is almost time to give birth. A full-term pregnancy can deliver between 37 and 42 weeks. Your baby's lungs are almost ready to breathe air.  The bones in your baby's head are now firm enough to protect it, but soft enough to move down through the birth canal. 
 You may feel excited, happy, anxious, or scared. You may wonder how you will know if you are in labor or what to expect during labor. Try to be flexible in your expectations of the birth. Because each birth is different, there is no way to know exactly what childbirth will be like for you. This care sheet will help you know what to expect and how to prepare. This may make your childbirth easier. If you haven't already had the Tdap shot during this pregnancy, talk to your doctor about getting it. It will help protect your  against pertussis infection. In the 36th week, most women have a test for group B streptococcus (GBS). GBS is a common bacteria that can live in the vagina and rectum. It can make your baby sick after birth. If you test positive, you will get antibiotics during labor. The medicine will keep your baby from getting the bacteria. Follow-up care is a key part of your treatment and safety. Be sure to make and go to all appointments, and call your doctor if you are having problems. It's also a good idea to know your test results and keep a list of the medicines you take. How can you care for yourself at home? Learn about pain relief choices · Pain is different for every woman. Talk with your doctor about your feelings about pain. · You can choose from several types of pain relief. These include medicine or breathing techniques, as well as comfort measures. You can use more than one option. · If you choose to have pain medicine during labor, talk to your doctor about your options. Some medicines lower anxiety and help with some of the pain. Others make your lower body numb so that you won't feel pain. · Be sure to tell your doctor about your pain medicine choice before you start labor or very early in your labor. You may be able to change your mind as labor progresses. · Rarely, a woman is put to sleep by medicine given through a mask or an IV. Labor and delivery · The first stage of labor has three parts: early, active, and transition. ¨ Most women have early labor at home. You can stay busy or rest, eat light snacks, drink clear fluids, and start counting contractions. ¨ When talking during a contraction gets hard, you may be moving to active labor. During active labor, you should head for the hospital if you are not there already. ¨ You are in active labor when contractions come every 3 to 4 minutes and last about 60 seconds. Your cervix is opening more rapidly. ¨ If your water breaks, contractions will come faster and stronger. ¨ During transition, your cervix is stretching, and contractions are coming more rapidly. ¨ You may want to push, but your cervix might not be ready. Your doctor will tell you when to push. · The second stage starts when your cervix is completely opened and you are ready to push. ¨ Contractions are very strong to push the baby down the birth canal. 
¨ You will feel the urge to push. You may feel like you need to have a bowel movement. ¨ You may be coached to push with contractions. These contractions will be very strong, but you will not have them as often. You can get a little rest between contractions. ¨ You may be emotional and irritable. You may not be aware of what is going on around you. ¨ One last push, and your baby is born. · The third stage is when a few more contractions push out the placenta. This may take 30 minutes or less. · The fourth stage is the welcome recovery. You may feel overwhelmed with emotions and exhausted but alert. This is a good time to start breastfeeding. Where can you learn more? Go to http://rosalio-emely.info/. Enter Y664 in the search box to learn more about \"Weeks 34 to 36 of Your Pregnancy: Care Instructions. \" Current as of: March 16, 2017 Content Version: 11.3 © 7558-2339 [employer].  Care instructions adapted under license by [address] (which disclaims liability or warranty for this information). If you have questions about a medical condition or this instruction, always ask your healthcare professional. [address] any warranty or liability for your use of this information. Pregnancy Precautions: Care Instructions Your Care Instructions There is no sure way to prevent labor before your due date ( labor) or to prevent most other pregnancy problems. But there are things you can do to increase your chances of a healthy pregnancy. Go to your appointments, follow your doctor's advice, and take good care of yourself. Eat well, and exercise (if your doctor agrees). And make sure to drink plenty of water. Follow-up care is a key part of your treatment and safety. Be sure to make and go to all appointments, and call your doctor if you are having problems. It's also a good idea to know your test results and keep a list of the medicines you take. How can you care for yourself at home? · Make sure you go to your prenatal appointments. At each visit, your doctor will check your blood pressure. Your doctor will also check to see if you have protein in your urine. High blood pressure and protein in urine are signs of preeclampsia. This condition can be dangerous for you and your baby. · Drink plenty of fluids, enough so that your urine is light yellow or clear like water. Dehydration can cause contractions. If you have kidney, heart, or liver disease and have to limit fluids, talk with your doctor before you increase the amount of fluids you drink. · Tell your doctor right away if you notice any symptoms of an infection, such as: ¨ Burning when you urinate. ¨ A foul-smelling discharge from your vagina. ¨ Vaginal itching. ¨ Unexplained fever. ¨ Unusual pain or soreness in your uterus or lower belly. · Eat a balanced diet.  Include plenty of foods that are high in calcium and iron. ¨ Foods high in calcium include milk, cheese, yogurt, almonds, and broccoli. ¨ Foods high in iron include red meat, shellfish, poultry, eggs, beans, raisins, whole-grain bread, and leafy green vegetables. · Do not smoke. If you need help quitting, talk to your doctor about stop-smoking programs and medicines. These can increase your chances of quitting for good. · Do not drink alcohol or use illegal drugs. · Follow your doctor's directions about activity. Your doctor will let you know how much, if any, exercise you can do. · Ask your doctor if you can have sex. If you are at risk for early labor, your doctor may ask you to not have sex. · Take care to prevent falls. During pregnancy, your joints are loose, and your balance is off. Sports such as bicycling, skiing, or in-line skating can increase your risk of falling. And don't ride horses or motorcycles, dive, water ski, scuba dive, or parachute jump while you are pregnant. · Avoid getting very hot. Do not use saunas or hot tubs. Avoid staying out in the sun in hot weather for long periods. Take acetaminophen (Tylenol) to lower a high fever. · Do not take any over-the-counter or herbal medicines or supplements without talking to your doctor or pharmacist first. 
When should you call for help? Call 911 anytime you think you may need emergency care. For example, call if: 
· You passed out (lost consciousness). · You have severe vaginal bleeding. · You have severe pain in your belly or pelvis. · You have had fluid gushing or leaking from your vagina and you know or think the umbilical cord is bulging into your vagina. If this happens, immediately get down on your knees so your rear end (buttocks) is higher than your head. This will decrease the pressure on the cord until help arrives. Call your doctor now or seek immediate medical care if: 
· You have signs of preeclampsia, such as: 
¨ Sudden swelling of your face, hands, or feet. ¨ New vision problems (such as dimness or blurring). ¨ A severe headache. · You have any vaginal bleeding. · You have belly pain or cramping. · You have a fever. · You have had regular contractions (with or without pain) for an hour. This means that you have 8 or more within 1 hour or 4 or more in 20 minutes after you change your position and drink fluids. · You have a sudden release of fluid from your vagina. · You have low back pain or pelvic pressure that does not go away. · You notice that your baby has stopped moving or is moving much less than normal. 
Watch closely for changes in your health, and be sure to contact your doctor if you have any problems. Where can you learn more? Go to http://rosalio-emely.info/. Enter [id number] in the search box to learn more about \"Pregnancy Precautions: Care Instructions. \" Current as of: March 16, 2017 Content Version: 11.3 © 2536-5840 [employer]. Care instructions adapted under license by [employer] (which disclaims liability or warranty for this information). If you have questions about a medical condition or this instruction, always ask your healthcare professional. [place] [address] any warranty or liability for your use of this information. Counting Your Baby's Kicks: Care Instructions Your Care Instructions Counting your baby's kicks is one way your doctor can tell that your baby is healthy. Most womenespecially in a first pregnancyfeel their baby move for the first time between 16 and 22 weeks. The movement may feel like flutters rather than kicks. Your baby may move more at certain times of the day. When you are active, you may notice less kicking than when you are resting. At your prenatal visits, your doctor will ask whether the baby is active. In your last trimester, your doctor may ask you to count the number of times you feel your baby move. Follow-up care is a key part of your treatment and safety. Be sure to make and go to all appointments, and call your doctor if you are having problems. It's also a good idea to know your test results and keep a list of the medicines you take. How do you count fetal kicks? · A common method of checking your baby's movement is to count the number of kicks or moves you feel in 1 hour. Ten movements (such as kicks, flutters, or rolls) in 1 hour are normal. Some doctors suggest that you count in the morning until you get to 10 movements. Then you can quit for that day and start again the next day. · Pick your baby's most active time of day to count. This may be any time from morning to evening. · If you do not feel 10 movements in an hour, your baby may be sleeping. Wait for the next hour and count again. When should you call for help? Call your doctor now or seek immediate medical care if: 
· You noticed that your baby has stopped moving or is moving much less than normal. 
Watch closely for changes in your health, and be sure to contact your doctor if you have any problems. Where can you learn more? Go to http://rosalio-emely.info/. Enter E375 in the search box to learn more about \"Counting Your Baby's Kicks: Care Instructions. \" Current as of: March 16, 2017 Content Version: 11.3 © 8119-9779 [employer]. Care instructions adapted under license by [employer] (which disclaims liability or warranty for this information). If you have questions about a medical condition or this instruction, always ask your healthcare professional. [address] any warranty or liability for your use of this information.

## 2017-08-04 NOTE — ROUTINE PROCESS
Patient ambulatory to unit for NST for IUGR. . 36 2/7 weeks. Denies leaking of vaginal fluids or bleeding. States positive fetal movement. EFM and [place] applied. . Oriented to room and surroundings. Significant other supportive at bedside.

## 2017-08-04 NOTE — IP AVS SNAPSHOT
[address] Patient: Neha Mills MRN: [id number] :1995 Current Discharge Medication List  
  
ASK your doctor about these medications Dose & Instructions Dispensing Information Comments Morning Noon Evening Bedtime  
 pnv w/o calcium-iron fum-fa 27-1 mg Tab Your last dose was: Your next dose is: Take  by mouth. Refills:  0

## 2017-08-04 NOTE — PROGRESS NOTES
Dr. Zhou Francis called, ok to discharge home. 1403: Reviewed discharge instructions with patient. No questions at end of teaching. Pt has difficulty with transportation. Informed patient that Medicaid will transport her back and forth if set up 3 business days in advance. Scheduled next NST for Monday. VSS, ambulatory off unit.

## 2017-08-04 NOTE — PROGRESS NOTES
Patient without complaints, good fetal movement. GBS/GC/CT today. Patient compliant with twice weekly NST secondary to IUGR, under care of heme-oncology for thrombocytopenia. Follow up 1 week. Plan of care discussed. Patient expressed understanding.

## 2017-08-04 NOTE — IP AVS SNAPSHOT
Summary of Care Report The Summary of Care report has been created to help improve care coordination. Users with access to [employer] or [address] (Web-based application) may access additional patient information including the Discharge Summary. If you are not currently a [address] user and need more information, please call the number listed below in the [place] [address] section and ask to be connected with Medical Records. Facility Information Name Address Phone [address] [facility] [address] [facility] [address] [address] [phone] Patient Information Patient Name Sex  Jina Kowalski ([id number]) Female 1995 Discharge Information Admitting Provider Service Area Unit Suki Bose DO / [address] Labor & Delivery / [phone] Discharge Provider Discharge Date/Time Discharge Disposition Destination (none) 2017 (Pending) AHR (none) Patient Language Language ENGLISH [13] Hospital Problems as of 2017  Reviewed: 2017 11:22 AM by Suki Bose DO None Non-Hospital Problems as of 2017  Reviewed: 2017 11:22 AM by Suki Bose DO Class Noted - Resolved Last Modified Active Problems Thrombocytopenia affecting pregnancy ([facility] [address]  2017 - Present 2017 by Suki Bose DO Entered by Suki Bose DO Rh negative state in antepartum period  2017 - Present 2017 by Suki Bose DO Entered by Suki Bose DO  
  IUGR (intrauterine growth restriction) affecting care of mother  2017 - Present 2017 by Suki Bose DO   Entered by Suki Bose DO  
  Pregnancy  2017 - Present 2017 by Fuad Hamilton MD  
  Entered by Fuad Hamilton MD  
 IUP (intrauterine pregnancy), incidental  7/17/2017 - Present 7/17/2017 by Ching Alejandre MD  
  Entered by Ching Alejandre MD  
  
You are allergic to the following Allergen Reactions Tramadol Itching Current Discharge Medication List  
  
ASK your doctor about these medications Dose & Instructions Dispensing Information Comments  
 pnv w/o calcium-iron fum-fa 27-1 mg Tab Take  by mouth. Refills:  0 Current Immunizations Name Date Rho(D) Immune Globulin - IM 2/18/2017 Tdap 6/6/2017 Follow-up Information Follow up With Details Comments Contact Info None   None (395) Patient stated that they have no PCP Discharge Instructions None Chart Review Routing History No Routing History on File

## 2017-08-07 LAB — B-HEM STREP SPEC QL CULT: POSITIVE

## 2017-08-08 PROBLEM — O99.820 GROUP B STREPTOCOCCAL CARRIAGE COMPLICATING PREGNANCY: Status: ACTIVE | Noted: 2017-08-08

## 2017-08-08 LAB
C TRACH RRNA SPEC QL NAA+PROBE: NEGATIVE
N GONORRHOEA RRNA SPEC QL NAA+PROBE: NEGATIVE
T VAGINALIS RRNA SPEC QL NAA+PROBE: NEGATIVE

## 2017-08-10 ENCOUNTER — HOSPITAL ENCOUNTER (OUTPATIENT)
Age: 22
Discharge: HOME | End: 2017-08-10
Attending: OBSTETRICS & GYNECOLOGY | Admitting: OBSTETRICS & GYNECOLOGY
Payer: MEDICAID

## 2017-08-10 VITALS — RESPIRATION RATE: 16 BRPM | TEMPERATURE: 98.4 F | OXYGEN SATURATION: 100 %

## 2017-08-10 PROCEDURE — 59025 FETAL NON-STRESS TEST: CPT

## 2017-08-10 NOTE — PROGRESS NOTES
Spoke with Dr Cate Reese by phone. Reported pt has category 1, reactive strip. One contraction on monitor that patient isn't feeling. Orders to discharge to home.

## 2017-08-10 NOTE — IP AVS SNAPSHOT
Summary of Care Report The Summary of Care report has been created to help improve care coordination. Users with access to [employer] or [address] (Web-based application) may access additional patient information including the Discharge Summary. If you are not currently a [address] user and need more information, please call the number listed below in the [place] [address] section and ask to be connected with Medical Records. Facility Information Name Address Phone [address] [facility] [address] [facility] [address] [address] [phone] Patient Information Patient Name Sex RAMON Simpson ([id number]) Female 1995 Discharge Information Admitting Provider Service Area Unit Lobo Dunham DO / [address] Labor & Delivery / [phone] Discharge Provider Discharge Date/Time Discharge Disposition Destination (none) (none) (none) (none) Patient Language Language ENGLISH [13] Hospital Problems as of 8/10/2017  Reviewed: 2017 11:22 AM by Lobo Dunham DO None Non-Hospital Problems as of 8/10/2017  Reviewed: 2017 11:22 AM by Lobo Dunham DO Class Noted - Resolved Last Modified Active Problems Thrombocytopenia affecting pregnancy ([facility] [address]  2017 - Present 2017 by Lobo Dunham,  Entered by Lobo Dunham,  Rh negative state in antepartum period  2017 - Present 2017 by Lobo Dunham DO Entered by Lobo Dunham DO  
  IUGR (intrauterine growth restriction) affecting care of mother  2017 - Present 2017 by Lobo Dunham,  Entered by Lobo Dunham DO  
  Group B streptococcal carriage complicating pregnancy  2444 - Present 2017 by Lobo Dunham DO   Entered by Lobo Dunham DO  
  
 You are allergic to the following Allergen Reactions Tramadol Itching Current Discharge Medication List  
  
ASK your doctor about these medications Dose & Instructions Dispensing Information Comments  
 pnv w/o calcium-iron fum-fa 27-1 mg Tab Take  by mouth. Refills:  0 Current Immunizations Name Date Rho(D) Immune Globulin - IM 2/18/2017 Tdap 6/6/2017 Follow-up Information None Discharge Instructions Return on Sunday, August 13 at 1830 for next NST. Follow up at [facility] for next scheduled appt. Chart Review Routing History No Routing History on File

## 2017-08-10 NOTE — IP AVS SNAPSHOT
[address] Patient: June Mckenzie MRN: [id number] :1995 Current Discharge Medication List  
  
ASK your doctor about these medications Dose & Instructions Dispensing Information Comments Morning Noon Evening Bedtime  
 pnv w/o calcium-iron fum-fa 27-1 mg Tab Your last dose was: Your next dose is: Take  by mouth. Refills:  0

## 2017-08-10 NOTE — DISCHARGE INSTRUCTIONS
Return on Sunday, August 13 at 1830 for next NST. Follow up at [facility] for next scheduled appt.

## 2017-08-10 NOTE — IP AVS SNAPSHOT
[address] Patient: Roseline Fragoso MRN: [id number] :1995 You are allergic to the following Allergen Reactions Tramadol Itching Recent Documentation OB Status Smoking Status Pregnant Never Smoker Emergency Contacts Name Discharge Info Relation Home Work Mobile Sara Draper CAREGIVER [3] Mother [14] [phone] About your hospitalization You were admitted on:  August 10, 2017 You last received care in the:  [address] [place] [address] You were discharged on:  August 10, 2017 Unit phone number:  [phone] Why you were hospitalized Your primary diagnosis was:  Not on File Providers Seen During Your Hospitalizations Provider Role Specialty Primary office phone Colton Warren DO Attending Provider Obstetrics & Gynecology [phone] Your Primary Care Physician (PCP) Primary Care Physician Office Phone Office Fax NONE ** None ** ** None ** Follow-up Information None Your Appointments 2017  3:45 PM EDT  
OB VISIT with Colton Warren DO  
[facility] ([facility]) [facility] [address] [phone]  
[phone] 2017 11:30 AM EDT Follow Up with Sara Barraza MD  
[address] [place] [address] Oncology [facility]) [address] [phone]  
[phone] Current Discharge Medication List  
  
ASK your doctor about these medications Dose & Instructions Dispensing Information Comments Morning Noon Evening Bedtime  
 pnv w/o calcium-iron fum-fa 27-1 mg Tab Your last dose was: Your next dose is: Take  by mouth. Refills:  0 Discharge Instructions Return on Sunday, August 13 at 1830 for next NST. Follow up at [facility] for next scheduled appt. Discharge Instructions Attachments/References PREGNANCY: DEREK SWENSON (ENGLISH) PREGNANCY: WEEK 37 (ENGLISH) Discharge Orders None Introducing [facility] & HEALTH SERVICES! Dear Jarrod Miguel: Thank you for requesting a [employer] account. Our records indicate that you already have an active [employer] account. You can access your account anytime at [email or web address] Did you know that you can access your hospital and ER discharge instructions at any time in Clear[employer]ide? You can also review all of your test results from your hospital stay or ER visit. Additional Information If you have questions, please visit the Frequently Asked Questions section of the ClearSlide website at [email or web address] Remember, ClearSlide is NOT to be used for urgent needs. For medical emergencies, dial 911. Now available from your iPhone and Android! General Information Please provide this summary of care documentation to your next provider. Patient Signature:  ____________________________________________________________ Date:  ____________________________________________________________  
  
Minesh Thakur Provider Signature:  ____________________________________________________________ Date:  ____________________________________________________________ More Information Counting Your Baby's Kicks: Care Instructions Your Care Instructions Counting your baby's kicks is one way your doctor can tell that your baby is healthy. Most womenespecially in a first pregnancyfeel their baby move for the first time between 16 and 22 weeks. The movement may feel like flutters rather than kicks. Your baby may move more at certain times of the day.  When you are active, you may notice less kicking than when you are resting. At your prenatal visits, your doctor will ask whether the baby is active. In your last trimester, your doctor may ask you to count the number of times you feel your baby move. Follow-up care is a key part of your treatment and safety. Be sure to make and go to all appointments, and call your doctor if you are having problems. It's also a good idea to know your test results and keep a list of the medicines you take. How do you count fetal kicks? · A common method of checking your baby's movement is to count the number of kicks or moves you feel in 1 hour. Ten movements (such as kicks, flutters, or rolls) in 1 hour are normal. Some doctors suggest that you count in the morning until you get to 10 movements. Then you can quit for that day and start again the next day. · Pick your baby's most active time of day to count. This may be any time from morning to evening. · If you do not feel 10 movements in an hour, your baby may be sleeping. Wait for the next hour and count again. When should you call for help? Call your doctor now or seek immediate medical care if: 
· You noticed that your baby has stopped moving or is moving much less than normal. 
Watch closely for changes in your health, and be sure to contact your doctor if you have any problems. Where can you learn more? Go to http://rosalio-emely.info/. Enter S410 in the search box to learn more about \"Counting Your Baby's Kicks: Care Instructions. \" Current as of: March 16, 2017 Content Version: 11.3 © 4461-2017 Rivet [employer] Sway. Care instructions adapted under license by [employer] (which disclaims liability or warranty for this information). If you have questions about a medical condition or this instruction, always ask your healthcare professional. [place] [address] any warranty or liability for your use of this information. Week 37 of Your Pregnancy: Care Instructions Your Care Instructions You are near the end of your pregnancyand you're probably pretty uncomfortable. It may be harder to walk around. Lying down probably isn't comfortable either. You may have trouble getting to sleep or staying asleep. Most women deliver their babies between 40 and 41 weeks. This is a good time to think about packing a bag for the hospital with items you'll need. Then you'll be ready when labor starts. Follow-up care is a key part of your treatment and safety. Be sure to make and go to all appointments, and call your doctor if you are having problems. It's also a good idea to know your test results and keep a list of the medicines you take. How can you care for yourself at home? Learn about breastfeeding · Breastfeeding is best for your baby and good for you. · Breast milk has antibodies to help your baby fight infections. · Mothers who breastfeed often lose weight faster, because making milk burns calories. · Learning the best ways to hold your baby will make breastfeeding easier. · Let your partner bathe and diaper the baby to keep your partner from feeling left out. Snuggle together when you breastfeed. · You may want to learn how to use a breast pump and store your milk. · If you choose to bottle feed, make the feeding feel like breastfeeding so you can bond with your baby. Always hold your baby and the bottle. Do not prop bottles or let your baby fall asleep with a bottle. Learn about crying · It is common for babies to cry for 1 to 3 hours a day. Some cry more, some cry less. · Babies don't cry to make you upset or because you are a bad parent. · Crying is how your baby communicates. Your baby may be hungry; have gas; need a diaper change; or feel cold, warm, tired, lonely, or tense. Sometimes babies cry for unknown reasons. · If you respond to your baby's needs, he or she will learn to trust you. · Try to stay calm when your baby cries.  Your baby may get more upset if he or she senses that you are upset. Know how to care for your  · Your baby's umbilical cord stump will drop off on its own, usually between 1 and 2 weeks. To care for your baby's umbilical cord area: ¨ Clean the area at the bottom of the cord 2 or 3 times a day. ¨ Pay special attention to the area where the cord attaches to the skin. ¨ Keep the diaper folded below the cord. ¨ Use a damp washcloth or cotton ball to sponge bathe your baby until the stump has come off. · Your baby's first dark stool is called meconium. After the meconium is passed, your baby will develop his or her own bowel pattern. ¨ Some babies, especially  babies, have several bowel movements a day. Others have one or two a day, or one every 2 to 3 days. ¨  babies often have loose, yellow stools. Formula-fed babies have more formed stools. ¨ If your baby's stools look like little pellets, he or she is constipated. After 2 days of constipation, call your baby's doctor. · If your baby will be circumcised, you can care for him at home. ¨ Gently rinse his penis with warm water after every diaper change. Do not try to remove the film that forms on the penis. This film will go away on its own. Pat dry. ¨ Put petroleum ointment, such as Vaseline, on the area of the diaper that will touch your baby's penis. This will keep the diaper from sticking to your baby. ¨ Ask the doctor about giving your baby acetaminophen (Tylenol) for pain. Where can you learn more? Go to http://rosalio-emely.info/. Enter 59 21 [id number] in the search box to learn more about \"Week 37 of Your Pregnancy: Care Instructions. \" Current as of: 2017 Content Version: 11.3 © 5691-4114 GoodThreads. Care instructions adapted under license by [employer] (which disclaims liability or warranty for this information).  If you have questions about a medical condition or this instruction, always ask your healthcare professional. [place] [address] any warranty or liability for your use of this information.

## 2017-08-14 ENCOUNTER — HOSPITAL ENCOUNTER (OUTPATIENT)
Age: 22
Discharge: HOME | End: 2017-08-14
Attending: OBSTETRICS & GYNECOLOGY | Admitting: OBSTETRICS & GYNECOLOGY
Payer: MEDICAID

## 2017-08-14 ENCOUNTER — ROUTINE PRENATAL (OUTPATIENT)
Dept: OBGYN CLINIC | Age: 22
End: 2017-08-14

## 2017-08-14 VITALS — SYSTOLIC BLOOD PRESSURE: 135 MMHG | HEART RATE: 79 BPM | DIASTOLIC BLOOD PRESSURE: 66 MMHG

## 2017-08-14 VITALS
BODY MASS INDEX: 39.27 KG/M2 | HEIGHT: 64 IN | HEART RATE: 98 BPM | DIASTOLIC BLOOD PRESSURE: 78 MMHG | WEIGHT: 230 LBS | SYSTOLIC BLOOD PRESSURE: 121 MMHG

## 2017-08-14 DIAGNOSIS — Z3A.37 37 WEEKS GESTATION OF PREGNANCY: ICD-10-CM

## 2017-08-14 DIAGNOSIS — Z34.83 PRENATAL CARE, SUBSEQUENT PREGNANCY, THIRD TRIMESTER: Primary | ICD-10-CM

## 2017-08-14 PROBLEM — Z34.90 PREGNANCY: Status: ACTIVE | Noted: 2017-08-14

## 2017-08-14 PROCEDURE — 59025 FETAL NON-STRESS TEST: CPT

## 2017-08-14 PROCEDURE — 99284 EMERGENCY DEPT VISIT MOD MDM: CPT

## 2017-08-14 RX ORDER — TERBUTALINE SULFATE 1 MG/ML
0.25 INJECTION SUBCUTANEOUS
Status: CANCELLED | OUTPATIENT
Start: 2017-08-14

## 2017-08-14 RX ORDER — MISOPROSTOL 200 UG/1
800 TABLET ORAL
Status: CANCELLED | OUTPATIENT
Start: 2017-08-14

## 2017-08-14 RX ORDER — NALBUPHINE HYDROCHLORIDE 10 MG/ML
10 INJECTION, SOLUTION INTRAMUSCULAR; INTRAVENOUS; SUBCUTANEOUS
Status: CANCELLED | OUTPATIENT
Start: 2017-08-14

## 2017-08-14 RX ORDER — SODIUM CHLORIDE 0.9 % (FLUSH) 0.9 %
5-10 SYRINGE (ML) INJECTION EVERY 8 HOURS
Status: CANCELLED | OUTPATIENT
Start: 2017-08-14

## 2017-08-14 RX ORDER — METHYLERGONOVINE MALEATE 0.2 MG/ML
0.2 INJECTION INTRAVENOUS AS NEEDED
Status: CANCELLED | OUTPATIENT
Start: 2017-08-14

## 2017-08-14 RX ORDER — ONDANSETRON 2 MG/ML
4 INJECTION INTRAMUSCULAR; INTRAVENOUS
Status: CANCELLED | OUTPATIENT
Start: 2017-08-14

## 2017-08-14 RX ORDER — SODIUM CHLORIDE 0.9 % (FLUSH) 0.9 %
5-10 SYRINGE (ML) INJECTION AS NEEDED
Status: CANCELLED | OUTPATIENT
Start: 2017-08-14

## 2017-08-14 RX ORDER — LIDOCAINE HYDROCHLORIDE 10 MG/ML
20 INJECTION, SOLUTION EPIDURAL; INFILTRATION; INTRACAUDAL; PERINEURAL AS NEEDED
Status: CANCELLED | OUTPATIENT
Start: 2017-08-14

## 2017-08-14 RX ORDER — OXYTOCIN/RINGER'S LACTATE 20/1000 ML
500 PLASTIC BAG, INJECTION (ML) INTRAVENOUS ONCE
Status: CANCELLED | OUTPATIENT
Start: 2017-08-14 | End: 2017-08-14

## 2017-08-14 RX ORDER — OXYTOCIN/RINGER'S LACTATE 20/1000 ML
125 PLASTIC BAG, INJECTION (ML) INTRAVENOUS CONTINUOUS
Status: CANCELLED | OUTPATIENT
Start: 2017-08-14

## 2017-08-14 RX ORDER — HYDROMORPHONE HYDROCHLORIDE 1 MG/ML
1 INJECTION, SOLUTION INTRAMUSCULAR; INTRAVENOUS; SUBCUTANEOUS
Status: CANCELLED | OUTPATIENT
Start: 2017-08-14

## 2017-08-14 RX ORDER — BUTORPHANOL TARTRATE 1 MG/ML
2 INJECTION INTRAMUSCULAR; INTRAVENOUS
Status: CANCELLED | OUTPATIENT
Start: 2017-08-14

## 2017-08-14 RX ORDER — SODIUM CHLORIDE, SODIUM LACTATE, POTASSIUM CHLORIDE, CALCIUM CHLORIDE 600; 310; 30; 20 MG/100ML; MG/100ML; MG/100ML; MG/100ML
125 INJECTION, SOLUTION INTRAVENOUS CONTINUOUS
Status: CANCELLED | OUTPATIENT
Start: 2017-08-14

## 2017-08-14 NOTE — IP AVS SNAPSHOT
[address] Patient: Danette Pérez MRN: [id number] :1995 You are allergic to the following Allergen Reactions Tramadol Itching Recent Documentation OB Status Smoking Status Pregnant Never Smoker Emergency Contacts Name Discharge Info Relation Home Work Mobile Marianna Douglas CAREGIVER [3] Mother [14] [phone] About your hospitalization You were admitted on:  2017 You last received care in the:  [address] [place] [address] You were discharged on:  2017 Unit phone number:  [phone] Why you were hospitalized Your primary diagnosis was:  Not on File Providers Seen During Your Hospitalizations Provider Role Specialty Primary office phone Daniel Clarke MD Attending Provider Obstetrics & Gynecology [phone] Your Primary Care Physician (PCP) Primary Care Physician Office Phone Office Fax NONE ** None ** ** None ** Follow-up Information None Your Appointments 2017 11:30 AM EDT Follow Up with Kiera Tang MD  
[facility] [address] [facility] [phone]  
[phone] Current Discharge Medication List  
  
ASK your doctor about these medications Dose & Instructions Dispensing Information Comments Morning Noon Evening Bedtime  
 pnv w/o calcium-iron fum-fa 27-1 mg Tab Your last dose was: Your next dose is: Take  by mouth. Refills:  0 Discharge Instructions None Discharge Instructions Attachments/References PREGNANCY: PRECAUTIONS (ENGLISH) Discharge Orders None Introducing [facility]! Dear Roberto Beams: Thank you for requesting a [employer] account. Our records indicate that you already have an active [employer] account. You can access your account anytime at [email or web address] Did you know that you can access your hospital and ER discharge instructions at any time in [employer]? You can also review all of your test results from your hospital stay or ER visit. Additional Information If you have questions, please visit the Frequently Asked Questions section of the [employer] website at [email or web address] Remember, [employer] is NOT to be used for urgent needs. For medical emergencies, dial 911. Now available from your iPhone and Android! General Information Please provide this summary of care documentation to your next provider. Patient Signature:  ____________________________________________________________ Date:  ____________________________________________________________  
  
[facility] Provider Signature:  ____________________________________________________________ Date:  ____________________________________________________________ More Information Pregnancy Precautions: Care Instructions Your Care Instructions There is no sure way to prevent labor before your due date ( labor) or to prevent most other pregnancy problems. But there are things you can do to increase your chances of a healthy pregnancy. Go to your appointments, follow your doctor's advice, and take good care of yourself. Eat well, and exercise (if your doctor agrees). And make sure to drink plenty of water. Follow-up care is a key part of your treatment and safety. Be sure to make and go to all appointments, and call your doctor if you are having problems. It's also a good idea to know your test results and keep a list of the medicines you take. How can you care for yourself at home? · Make sure you go to your prenatal appointments. At each visit, your doctor will check your blood pressure. Your doctor will also check to see if you have protein in your urine. High blood pressure and protein in urine are signs of preeclampsia. This condition can be dangerous for you and your baby. · Drink plenty of fluids, enough so that your urine is light yellow or clear like water. Dehydration can cause contractions. If you have kidney, heart, or liver disease and have to limit fluids, talk with your doctor before you increase the amount of fluids you drink. · Tell your doctor right away if you notice any symptoms of an infection, such as: ¨ Burning when you urinate. ¨ A foul-smelling discharge from your vagina. ¨ Vaginal itching. ¨ Unexplained fever. ¨ Unusual pain or soreness in your uterus or lower belly. · Eat a balanced diet. Include plenty of foods that are high in calcium and iron. ¨ Foods high in calcium include milk, cheese, yogurt, almonds, and broccoli. ¨ Foods high in iron include red meat, shellfish, poultry, eggs, beans, raisins, whole-grain bread, and leafy green vegetables. · Do not smoke. If you need help quitting, talk to your doctor about stop-smoking programs and medicines. These can increase your chances of quitting for good. · Do not drink alcohol or use illegal drugs. · Follow your doctor's directions about activity. Your doctor will let you know how much, if any, exercise you can do. · Ask your doctor if you can have sex. If you are at risk for early labor, your doctor may ask you to not have sex. · Take care to prevent falls. During pregnancy, your joints are loose, and your balance is off. Sports such as bicycling, skiing, or in-line skating can increase your risk of falling. And don't ride horses or motorcycles, dive, water ski, scuba dive, or parachute jump while you are pregnant. · Avoid getting very hot. Do not use saunas or hot tubs.  Avoid staying out in the sun in hot weather for long periods. Take acetaminophen (Tylenol) to lower a high fever. · Do not take any over-the-counter or herbal medicines or supplements without talking to your doctor or pharmacist first. 
When should you call for help? Call 911 anytime you think you may need emergency care. For example, call if: 
· You passed out (lost consciousness). · You have severe vaginal bleeding. · You have severe pain in your belly or pelvis. · You have had fluid gushing or leaking from your vagina and you know or think the umbilical cord is bulging into your vagina. If this happens, immediately get down on your knees so your rear end (buttocks) is higher than your head. This will decrease the pressure on the cord until help arrives. Call your doctor now or seek immediate medical care if: 
· You have signs of preeclampsia, such as: 
¨ Sudden swelling of your face, hands, or feet. ¨ New vision problems (such as dimness or blurring). ¨ A severe headache. · You have any vaginal bleeding. · You have belly pain or cramping. · You have a fever. · You have had regular contractions (with or without pain) for an hour. This means that you have 8 or more within 1 hour or 4 or more in 20 minutes after you change your position and drink fluids. · You have a sudden release of fluid from your vagina. · You have low back pain or pelvic pressure that does not go away. · You notice that your baby has stopped moving or is moving much less than normal. 
Watch closely for changes in your health, and be sure to contact your doctor if you have any problems. Where can you learn more? Go to http://rosalio-emely.info/. Enter [id number] in the search box to learn more about \"Pregnancy Precautions: Care Instructions. \" Current as of: March 16, 2017 Content Version: 11.3 © 0010-3182 Nominum, Inc[employer].  Care instructions adapted under license by [address] (which disclaims liability or warranty for this information). If you have questions about a medical condition or this instruction, always ask your healthcare professional. [address] any warranty or liability for your use of this information.

## 2017-08-14 NOTE — IP AVS SNAPSHOT
Summary of Care Report The Summary of Care report has been created to help improve care coordination. Users with access to [employer] or [employer] [address] (Web-based application) may access additional patient information including the Discharge Summary. If you are not currently a [address] user and need more information, please call the number listed below in the [place] [address] section and ask to be connected with Medical Records. Facility Information Name Address Phone [facility] [address] [facility] [address] [phone] Patient Information Patient Name Sex  Anish Loomis ([id number]) Female 1995 Discharge Information Admitting Provider Service Area Unit Keenan Owens MD / [address] Labor & Delivery / [phone] Discharge Provider Discharge Date/Time Discharge Disposition Destination (none) (none) (none) (none) Patient Language Language ENGLISH [13] Hospital Problems as of 2017  Reviewed: 2017 11:22 AM by Zee Lights, DO None Non-Hospital Problems as of 2017  Reviewed: 2017 11:22 AM by Zee Lights, DO Class Noted - Resolved Last Modified Active Problems Thrombocytopenia affecting pregnancy ([facility] [address]  2017 - Present 2017 by Zee Lights, DO Entered by Zee Lights, DO Rh negative state in antepartum period  2017 - Present 2017 by Zee Lights, DO Entered by Zee Lights, DO  
  IUGR (intrauterine growth restriction) affecting care of mother  2017 - Present 2017 by Zee Lights, DO Entered by Zee Lights, DO  
  Group B streptococcal carriage complicating pregnancy  4222 - Present 2017 by Zee Lights, DO   Entered by Zee Lights, DO  
  
 You are allergic to the following Allergen Reactions Tramadol Itching Current Discharge Medication List  
  
ASK your doctor about these medications Dose & Instructions Dispensing Information Comments  
 pnv w/o calcium-iron fum-fa 27-1 mg Tab Take  by mouth. Refills:  0 Current Immunizations Name Date Rho(D) Immune Globulin - IM 2/18/2017 Tdap 6/6/2017 Follow-up Information None Discharge Instructions None Chart Review Routing History No Routing History on File

## 2017-08-14 NOTE — ROUTINE PROCESS
Bedside shift change report given to raz hernandez rn (oncoming nurse) by oscar ness rn (offgoing nurse). Report included the following information SBAR, Kardex and MAR.

## 2017-08-14 NOTE — PROGRESS NOTES
Dr Abdias Moscoso returned called. Report reactive nst. Per oscar ness and a david rn.3 uc in 20 min  Pt denies feeling uc .

## 2017-08-14 NOTE — PROGRESS NOTES
Orders to d/c home reactive nst.pregnancy precautions reviewed with pt. She was able to verbalize understand. pt scheduled for ind on wed.

## 2017-08-14 NOTE — PROGRESS NOTES
LABOR AND DELIVERY TRIAGE- Non stress test    Patient presents to L&D Triage for NST  Indication: IUGR  Presenting symptoms and initial assessment discussed with RN caring for the patient. Remote EFM reviewed:  Reactive. [place]:  Random contraction      A/P:  NST reactive. IOL scheduled @ 38 weeks per MFM. Reassuring fetal status.   Disposition: Avni Vasquez MD  8/14/2017  1:10 PM

## 2017-08-14 NOTE — PROGRESS NOTES
37w5d. Patient doing well. Denies contractions, decreased fetal movement, vaginal bleeding, leak of fluid. FGR:  Continue NST today. For delivery by 38 weeks per MFM. Thrombocytopenia  GBS+  IOL scheduled for tomorrow PM.  I have verbalized the plan of care with patient. The patient was given a full opportunity to ask questions, indicated that her questions had been answered and expressed understanding.

## 2017-08-14 NOTE — PATIENT INSTRUCTIONS
Week 37 of Your Pregnancy: Care Instructions  Your Care Instructions    You are near the end of your pregnancy--and you're probably pretty uncomfortable. It may be harder to walk around. Lying down probably isn't comfortable either. You may have trouble getting to sleep or staying asleep. Most women deliver their babies between 40 and 41 weeks. This is a good time to think about packing a bag for the hospital with items you'll need. Then you'll be ready when labor starts. Follow-up care is a key part of your treatment and safety. Be sure to make and go to all appointments, and call your doctor if you are having problems. It's also a good idea to know your test results and keep a list of the medicines you take. How can you care for yourself at home? Learn about breastfeeding  · Breastfeeding is best for your baby and good for you. · Breast milk has antibodies to help your baby fight infections. · Mothers who breastfeed often lose weight faster, because making milk burns calories. · Learning the best ways to hold your baby will make breastfeeding easier. · Let your partner bathe and diaper the baby to keep your partner from feeling left out. Snuggle together when you breastfeed. · You may want to learn how to use a breast pump and store your milk. · If you choose to bottle feed, make the feeding feel like breastfeeding so you can bond with your baby. Always hold your baby and the bottle. Do not prop bottles or let your baby fall asleep with a bottle. Learn about crying  · It is common for babies to cry for 1 to 3 hours a day. Some cry more, some cry less. · Babies don't cry to make you upset or because you are a bad parent. · Crying is how your baby communicates. Your baby may be hungry; have gas; need a diaper change; or feel cold, warm, tired, lonely, or tense. Sometimes babies cry for unknown reasons. · If you respond to your baby's needs, he or she will learn to trust you.   · Try to stay calm when your baby cries. Your baby may get more upset if he or she senses that you are upset. Know how to care for your   · Your baby's umbilical cord stump will drop off on its own, usually between 1 and 2 weeks. To care for your baby's umbilical cord area:  ¨ Clean the area at the bottom of the cord 2 or 3 times a day. ¨ Pay special attention to the area where the cord attaches to the skin. ¨ Keep the diaper folded below the cord. ¨ Use a damp washcloth or cotton ball to sponge bathe your baby until the stump has come off. · Your baby's first dark stool is called meconium. After the meconium is passed, your baby will develop his or her own bowel pattern. ¨ Some babies, especially  babies, have several bowel movements a day. Others have one or two a day, or one every 2 to 3 days. ¨  babies often have loose, yellow stools. Formula-fed babies have more formed stools. ¨ If your baby's stools look like little pellets, he or she is constipated. After 2 days of constipation, call your baby's doctor. · If your baby will be circumcised, you can care for him at home. ¨ Gently rinse his penis with warm water after every diaper change. Do not try to remove the film that forms on the penis. This film will go away on its own. Pat dry. ¨ Put petroleum ointment, such as Vaseline, on the area of the diaper that will touch your baby's penis. This will keep the diaper from sticking to your baby. ¨ Ask the doctor about giving your baby acetaminophen (Tylenol) for pain. Where can you learn more? Go to http://rosalio-emely.info/. Enter 68 [id number] in the search box to learn more about \"Week 37 of Your Pregnancy: Care Instructions. \"  Current as of: 2017  Content Version: 11.3  © 6207-2814 [employer]. Care instructions adapted under license by [employer] (which disclaims liability or warranty for this information).  If you have questions about a medical condition or this instruction, always ask your healthcare professional. [place] [address] any warranty or liability for your use of this information.

## 2017-08-15 ENCOUNTER — HOSPITAL ENCOUNTER (INPATIENT)
Age: 22
LOS: 4 days | Discharge: HOME | DRG: 540 | End: 2017-08-19
Attending: OBSTETRICS & GYNECOLOGY | Admitting: OBSTETRICS & GYNECOLOGY
Payer: MEDICAID

## 2017-08-15 LAB
ABO + RH BLD: NORMAL
ALBUMIN SERPL BCP-MCNC: 2.3 G/DL (ref 3.4–5)
ALBUMIN/GLOB SERPL: 0.6 {RATIO} (ref 0.8–1.7)
ALP SERPL-CCNC: 109 U/L (ref 45–117)
ALT SERPL-CCNC: 31 U/L (ref 13–56)
ANION GAP BLD CALC-SCNC: 8 MMOL/L (ref 3–18)
AST SERPL W P-5'-P-CCNC: 28 U/L (ref 15–37)
BASOPHILS # BLD: 0 K/UL (ref 0–0.06)
BASOPHILS NFR BLD: 0 % (ref 0–2)
BILIRUB SERPL-MCNC: 0.2 MG/DL (ref 0.2–1)
BLOOD GROUP ANTIBODIES SERPL: NORMAL
BUN SERPL-MCNC: 5 MG/DL (ref 7–18)
BUN/CREAT SERPL: 9 (ref 12–20)
CALCIUM SERPL-MCNC: 8.9 MG/DL (ref 8.5–10.1)
CHLORIDE SERPL-SCNC: 107 MMOL/L (ref 100–108)
CO2 SERPL-SCNC: 24 MMOL/L (ref 21–32)
CREAT SERPL-MCNC: 0.56 MG/DL (ref 0.6–1.3)
DIFFERENTIAL METHOD BLD: ABNORMAL
EOSINOPHIL # BLD: 0.1 K/UL (ref 0–0.4)
EOSINOPHIL NFR BLD: 1 % (ref 0–5)
ERYTHROCYTE [DISTWIDTH] IN BLOOD BY AUTOMATED COUNT: 13.4 % (ref 11.6–14.5)
GLOBULIN SER CALC-MCNC: 3.9 G/DL (ref 2–4)
GLUCOSE SERPL-MCNC: 96 MG/DL (ref 74–99)
HCT VFR BLD AUTO: 39.1 % (ref 35–45)
HGB BLD-MCNC: 13.2 G/DL (ref 12–16)
LYMPHOCYTES # BLD: 3 K/UL (ref 0.9–3.6)
LYMPHOCYTES NFR BLD: 28 % (ref 21–52)
MCH RBC QN AUTO: 29.4 PG (ref 24–34)
MCHC RBC AUTO-ENTMCNC: 33.8 G/DL (ref 31–37)
MCV RBC AUTO: 87.1 FL (ref 74–97)
MONOCYTES # BLD: 0.8 K/UL (ref 0.05–1.2)
MONOCYTES NFR BLD: 7 % (ref 3–10)
NEUTS SEG # BLD: 6.8 K/UL (ref 1.8–8)
NEUTS SEG NFR BLD: 64 % (ref 40–73)
PLATELET # BLD AUTO: 103 K/UL (ref 135–420)
POTASSIUM SERPL-SCNC: 3.8 MMOL/L (ref 3.5–5.5)
PROT SERPL-MCNC: 6.2 G/DL (ref 6.4–8.2)
RBC # BLD AUTO: 4.49 M/UL (ref 4.2–5.3)
SODIUM SERPL-SCNC: 139 MMOL/L (ref 136–145)
SPECIMEN EXP DATE BLD: NORMAL
WBC # BLD AUTO: 10.7 K/UL (ref 4.6–13.2)

## 2017-08-15 PROCEDURE — 85025 COMPLETE CBC W/AUTO DIFF WBC: CPT | Performed by: OBSTETRICS & GYNECOLOGY

## 2017-08-15 PROCEDURE — 74011000258 HC RX REV CODE- 258: Performed by: OBSTETRICS & GYNECOLOGY

## 2017-08-15 PROCEDURE — 4A0HXCZ MEASUREMENT OF PRODUCTS OF CONCEPTION, CARDIAC RATE, EXTERNAL APPROACH: ICD-10-PCS | Performed by: OBSTETRICS & GYNECOLOGY

## 2017-08-15 PROCEDURE — 74011250637 HC RX REV CODE- 250/637: Performed by: OBSTETRICS & GYNECOLOGY

## 2017-08-15 PROCEDURE — 74011250636 HC RX REV CODE- 250/636: Performed by: OBSTETRICS & GYNECOLOGY

## 2017-08-15 PROCEDURE — 86900 BLOOD TYPING SEROLOGIC ABO: CPT | Performed by: OBSTETRICS & GYNECOLOGY

## 2017-08-15 PROCEDURE — 3E0P7GC INTRODUCTION OF OTHER THERAPEUTIC SUBSTANCE INTO FEMALE REPRODUCTIVE, VIA NATURAL OR ARTIFICIAL OPENING: ICD-10-PCS | Performed by: OBSTETRICS & GYNECOLOGY

## 2017-08-15 PROCEDURE — 65270000029 HC RM PRIVATE

## 2017-08-15 PROCEDURE — 36415 COLL VENOUS BLD VENIPUNCTURE: CPT | Performed by: OBSTETRICS & GYNECOLOGY

## 2017-08-15 PROCEDURE — 80053 COMPREHEN METABOLIC PANEL: CPT | Performed by: OBSTETRICS & GYNECOLOGY

## 2017-08-15 RX ORDER — HYDROMORPHONE HYDROCHLORIDE 1 MG/ML
1 INJECTION, SOLUTION INTRAMUSCULAR; INTRAVENOUS; SUBCUTANEOUS
Status: DISCONTINUED | OUTPATIENT
Start: 2017-08-15 | End: 2017-08-17 | Stop reason: SDUPTHER

## 2017-08-15 RX ORDER — SODIUM CHLORIDE, SODIUM LACTATE, POTASSIUM CHLORIDE, CALCIUM CHLORIDE 600; 310; 30; 20 MG/100ML; MG/100ML; MG/100ML; MG/100ML
125 INJECTION, SOLUTION INTRAVENOUS CONTINUOUS
Status: DISCONTINUED | OUTPATIENT
Start: 2017-08-15 | End: 2017-08-17 | Stop reason: HOSPADM

## 2017-08-15 RX ORDER — SODIUM CHLORIDE 0.9 % (FLUSH) 0.9 %
5-10 SYRINGE (ML) INJECTION AS NEEDED
Status: DISCONTINUED | OUTPATIENT
Start: 2017-08-15 | End: 2017-08-19 | Stop reason: HOSPADM

## 2017-08-15 RX ORDER — SODIUM CHLORIDE 0.9 % (FLUSH) 0.9 %
5-10 SYRINGE (ML) INJECTION EVERY 8 HOURS
Status: DISCONTINUED | OUTPATIENT
Start: 2017-08-15 | End: 2017-08-19 | Stop reason: HOSPADM

## 2017-08-15 RX ORDER — LIDOCAINE HYDROCHLORIDE 10 MG/ML
20 INJECTION, SOLUTION EPIDURAL; INFILTRATION; INTRACAUDAL; PERINEURAL AS NEEDED
Status: DISCONTINUED | OUTPATIENT
Start: 2017-08-15 | End: 2017-08-17 | Stop reason: HOSPADM

## 2017-08-15 RX ORDER — TERBUTALINE SULFATE 1 MG/ML
0.25 INJECTION SUBCUTANEOUS
Status: DISCONTINUED | OUTPATIENT
Start: 2017-08-15 | End: 2017-08-17 | Stop reason: HOSPADM

## 2017-08-15 RX ORDER — BUTORPHANOL TARTRATE 1 MG/ML
2 INJECTION INTRAMUSCULAR; INTRAVENOUS
Status: DISCONTINUED | OUTPATIENT
Start: 2017-08-15 | End: 2017-08-17 | Stop reason: HOSPADM

## 2017-08-15 RX ORDER — NALBUPHINE HYDROCHLORIDE 10 MG/ML
10 INJECTION, SOLUTION INTRAMUSCULAR; INTRAVENOUS; SUBCUTANEOUS
Status: DISCONTINUED | OUTPATIENT
Start: 2017-08-15 | End: 2017-08-17 | Stop reason: HOSPADM

## 2017-08-15 RX ORDER — OXYTOCIN/RINGER'S LACTATE 20/1000 ML
500 PLASTIC BAG, INJECTION (ML) INTRAVENOUS ONCE
Status: ACTIVE | OUTPATIENT
Start: 2017-08-15 | End: 2017-08-16

## 2017-08-15 RX ORDER — OXYTOCIN/RINGER'S LACTATE 20/1000 ML
125 PLASTIC BAG, INJECTION (ML) INTRAVENOUS CONTINUOUS
Status: DISCONTINUED | OUTPATIENT
Start: 2017-08-15 | End: 2017-08-17 | Stop reason: HOSPADM

## 2017-08-15 RX ORDER — ONDANSETRON 2 MG/ML
4 INJECTION INTRAMUSCULAR; INTRAVENOUS
Status: DISCONTINUED | OUTPATIENT
Start: 2017-08-15 | End: 2017-08-17 | Stop reason: SDUPTHER

## 2017-08-15 RX ORDER — METHYLERGONOVINE MALEATE 0.2 MG/ML
0.2 INJECTION INTRAVENOUS AS NEEDED
Status: DISCONTINUED | OUTPATIENT
Start: 2017-08-15 | End: 2017-08-17 | Stop reason: HOSPADM

## 2017-08-15 RX ORDER — MISOPROSTOL 200 UG/1
800 TABLET ORAL
Status: DISCONTINUED | OUTPATIENT
Start: 2017-08-15 | End: 2017-08-17 | Stop reason: HOSPADM

## 2017-08-15 RX ADMIN — SODIUM CHLORIDE 5 MILLION UNITS: 900 INJECTION INTRAVENOUS at 21:29

## 2017-08-15 RX ADMIN — DINOPROSTONE 10 MG: 10 INSERT VAGINAL at 21:29

## 2017-08-15 RX ADMIN — SODIUM CHLORIDE, SODIUM LACTATE, POTASSIUM CHLORIDE, AND CALCIUM CHLORIDE 125 ML/HR: 600; 310; 30; 20 INJECTION, SOLUTION INTRAVENOUS at 21:28

## 2017-08-15 NOTE — IP AVS SNAPSHOT
Bebe Bae 
 
 
 [address] [place] [address] Patient: Margaret Scherer MRN: [id number] :[id number] You are allergic to the following Allergen Reactions Shrimp Itching Tramadol Itching Immunizations Administered for This Admission Name Date Rho(D) Immune Globulin - IM 2017 Recent Documentation Height Weight Breastfeeding? BMI OB Status Smoking Status 1.626 m 104.3 kg Unknown 39.48 kg/m2 Recent pregnancy Never Smoker Emergency Contacts Name Discharge Info Relation Home Work Mobile Dontae Chun CAREGIVER [3] Mother [14] [phone] About your hospitalization You were admitted on:  August 15, 2017 You last received care in the:  [place] [address] You were discharged on:  2017 Unit phone number:  [phone] Why you were hospitalized Your primary diagnosis was:  Not on File Your diagnoses also included:  Iugr (Intrauterine Growth Restriction) Affecting Care Of Mother, 40 Weeks Gestation Of Pregnancy, Thrombocytopenia Affecting Pregnancy (Hcc), Rh Negative State In Antepartum Period, Group B Streptococcal Carriage Complicating Pregnancy Providers Seen During Your Hospitalizations Provider Role Specialty Primary office phone Nette Ludwig DO Attending Provider Obstetrics & Gynecology [phone] Your Primary Care Physician (PCP) Primary Care Physician Office Phone Office Fax NONE ** None ** ** None ** Follow-up Information Follow up With Details Comments Contact Info Michell Villalobos DO Schedule an appointment as soon as possible for a visit in 2 weeks For wound re-check [address] [facility] 83 [phone] 
[phone] Your Appointments 2017 11:30 AM EDT Follow Up with Ligia Ahuja MD  
[facility] [address]) [address] [facility] [address] [phone]  
[phone] Current Discharge Medication List  
  
START taking these medications Dose & Instructions Dispensing Information Comments Morning Noon Evening Bedtime  
 ibuprofen 800 mg tablet Commonly known as:  MOTRIN Your last dose was: Your next dose is:    
   
   
 Dose:  800 mg Take 1 Tab by mouth every eight (8) hours as needed. Quantity:  30 Tab Refills:  2  
     
   
   
   
  
 oxyCODONE-acetaminophen 5-325 mg per tablet Commonly known as:  PERCOCET Your last dose was: Your next dose is:    
   
   
 Dose:  1-2 Tab Take 1-2 Tabs by mouth every four (4) hours as needed. Max Daily Amount: 12 Tabs. Quantity:  30 Tab Refills:  0  
     
   
   
   
  
 senna-docusate 8.6-50 mg per tablet Commonly known as:  Jyl Orf Your last dose was: Your next dose is:    
   
   
 Dose:  1 Tab Take 1 Tab by mouth daily. Quantity:  60 Tab Refills:  0 CONTINUE these medications which have NOT CHANGED Dose & Instructions Dispensing Information Comments Morning Noon Evening Bedtime  
 pnv w/o calcium-iron fum-fa 27-1 mg Tab Your last dose was: Your next dose is: Take  by mouth. Refills:  0 Where to Get Your Medications These medications were sent to [address] [facility], [address] [facility] [address]) & MAIN  [address] [facility], [address] [address] Phone:  [phone]  
  ibuprofen 800 mg tablet  
 senna-docusate 8.6-50 mg per tablet Information on where to get these meds will be given to you by the nurse or doctor. ! Ask your nurse or doctor about these medications  
  oxyCODONE-acetaminophen 5-325 mg per tablet Discharge Instructions [place] 
 [employer] 83 [id number] PROCEDURE:  Section. [address] [facility] if any of the following occur: ? You are unable to urinate. Urgency to urinate is not uncommon. ? Excessive vaginal bleeding > 1 maxi pad an hour for more then 2 hours straight. ? Temperature above 101.0° and / or chills. ? You are nauseous and / or vomiting and you cannot hold down any fluids. ? Your pain is not controlled with the pain medication prescribed. Special Considerations:  
 
? If you had a  section delivery do not drive for at least 72 hours after the procedure and until you are no longer taking narcotic pain medication and you are able to move and react without hesitation. ? For the first two weeks you should rest and take care of yourself and your baby! 
? You may return to work in 6 weeks. Pelvic rest (nothing in the vagina) for 6 weeks. No heavy lifting over 10 pounds & no strenuous exercise for 6 weeks. Other instructions:   
  
 
 
 
Please continue any of your other regular home medications including your vitamins and other supplements. . 
If you have not already scheduled your post partum appointment please do so with our office staff. Your post partum appointment should be in 2 weeks. Please contact [facility] or go to the nearest Emergency Department / Urgent Care facility for any other medical questions or concerns and to schedule your post partum visit.  Section: What to Expect at [facility] Your Recovery A  section, or , is surgery to deliver your baby through a cut, called an incision, that the doctor makes in your lower belly and uterus. You may have some pain in your lower belly and need pain medicine for 1 to 2 weeks. You can expect some vaginal bleeding for several weeks. You will probably need about 6 weeks to fully recover. It is important to take it easy while the incision is healing. Avoid heavy lifting, strenuous activities, or exercises that strain the belly muscles while you are recovering. Ask a family member or friend for help with housework, cooking, and shopping. This care sheet gives you a general idea about how long it will take for you to recover. But each person recovers at a different pace. Follow the steps below to get better as quickly as possible. How can you care for yourself at home? Activity Rest when you feel tired. Getting enough sleep will help you recover. Try to walk each day. Start by walking a little more than you did the day before. Bit by bit, increase the amount you walk. Walking boosts blood flow and helps prevent pneumonia, constipation, and blood clots. Avoid strenuous activities, such as bicycle riding, jogging, weightlifting, and aerobic exercise, for 6 weeks or until your doctor says it is okay. Until your doctor says it is okay, do not lift anything heavier than your baby. Do not do sit-ups or other exercises that strain the belly muscles for 6 weeks or until your doctor says it is okay. Hold a pillow over your incision when you cough or take deep breaths. This will support your belly and decrease your pain. You may shower as usual. Pat the incision dry when you are done. You will have some vaginal bleeding. Wear sanitary pads. Do not douche or use tampons until your doctor says it is okay. Ask your doctor when you can drive again. You will probably need to take at least 6 weeks off work. It depends on the type of work you do and how you feel. Ask your doctor when it is okay for you to have sex. Diet You can eat your normal diet. If your stomach is upset, try bland, low-fat foods like plain rice, broiled chicken, toast, and yogurt. Drink plenty of fluids (unless your doctor tells you not to).   
You may notice that your bowel movements are not regular right after your surgery. This is common. Try to avoid constipation and straining with bowel movements. You may want to take a fiber supplement every day. If you have not had a bowel movement after a couple of days, ask your doctor about taking a mild laxative. If you are breast-feeding, do not drink any alcohol. Medicines Take pain medicines exactly as directed. If the doctor gave you a prescription medicine for pain, take it as prescribed. If you are not taking a prescription pain medicine, ask your doctor if you can take an over-the-counter medicine. If you think your pain medicine is making you sick to your stomach: Take your medicine after meals (unless your doctor has told you not to). Ask your doctor for a different pain medicine. If your doctor prescribed antibiotics, take them as directed. Do not stop taking them just because you feel better. You need to take the full course of antibiotics. Incision care If you have strips of tape on the incision, leave the tape on for a week or until it falls off. Wash the area daily with warm, soapy water, and pat it dry. Don't use hydrogen peroxide or alcohol, which can slow healing. You may cover the area with a gauze bandage if it weeps or rubs against clothing. Change the bandage every day. Keep the area clean and dry. Other instructions If you breast-feed your baby, you may be more comfortable while you are healing if you place the baby so that he or she is not resting on your belly. Try tucking your baby under your arm, with his or her body along the side you will be feeding on. Support your baby's upper body with your arm. With that hand you can control your baby's head to bring his or her mouth to your breast. This is sometimes called the football hold. Follow-up care is a key part of your treatment and safety.  Be sure to make and go to all appointments, and call your doctor if you are having problems. Its also a good idea to know your test results and keep a list of the medicines you take. When should you call for help? Call 911 anytime you think you may need emergency care. For example, call if: You passed out (lost consciousness). You have severe trouble breathing. You have sudden chest pain and shortness of breath, or you cough up blood. You have severe pain in your belly. Call your doctor now or seek immediate medical care if:  
You have bright red vaginal bleeding that soaks one or more pads each hour for 2 or more hours. Your vaginal bleeding seems to be getting heavier or is still bright red 4 days after delivery. You pass blood clots larger than the size of a golf ball. You have vaginal discharge that smells bad. You are sick to your stomach or cannot keep fluids down. You have pain that does not get better after you take pain medicine. You have loose stitches, or your incision comes open. Your belly feels tender, or full and hard. You have signs of infection, such as: Increased pain, swelling, warmth, or redness. Red streaks leading from the incision. Pus draining from the incision. Swollen lymph nodes in your neck, armpits, or groin. A fever. You have signs of a blood clot, such as:  
Pain in your calf, back of the knee, thigh, or groin. Redness and swelling in your leg or groin. You have trouble passing urine or stool, especially if you have pain or swelling in your lower belly. You feel sad, tearful, or hopeless for more than a few days, or you have troubling or dangerous thoughts. Watch closely for changes in your health, and be sure to contact your doctor if: You do not get better as expected. Where can you learn more? Go to Advise Only.be Enter M806 in the search box to learn more about \" Section: What to Expect at Home. \"   
© 0527-7402 Healthwise, Incorporated.  Care instructions adapted under license by Javed Sandoval (which disclaims liability or warranty for this information). This care instruction is for use with your licensed healthcare professional. If you have questions about a medical condition or this instruction, always ask your healthcare professional. [address] any warranty or liability for your use of this information. Content Version: 37.5.411181; Current as of: June 4, 2014 Discharge Instructions Attachments/References POSTPARTUM (ENGLISH) SAFE SLEEP AND SUDDEN INFANT DEATH SYNDROME (SIDS): PEDIATRIC: GENERAL INFO (ENGLISH) SHAKEN BABY SYNDROME: PEDIATRIC (ENGLISH) DEPRESSION: POSTPARTUM (ENGLISH) PARENTING: STRESS AND INFANTS (ENGLISH) Discharge Orders None [facility] Announcement We are excited to announce that we are making your provider's discharge notes available to you in GI-View. You will see these notes when they are completed and signed by the physician that discharged you from your recent hospital stay. If you have any questions or concerns about any information you see in GI-View, please call the Health Information Department where you were seen or reach out to your Primary Care Provider for more information about your plan of care. Introducing [facility] & HEALTH SERVICES! Dear Rodri Leigh: Thank you for requesting a GI-View account. Our records indicate that you already have an active GI-View account. You can access your account anytime at [email or web address] Did you know that you can access your hospital and ER discharge instructions at any time in GI-View? You can also review all of your test results from your hospital stay or ER visit. Additional Information If you have questions, please visit the Frequently Asked Questions section of the GI-View website at [email or web address] Remember, GI-View is NOT to be used for urgent needs. For medical emergencies, dial 911. Now available from your iPhone and Android! General Information Please provide this summary of care documentation to your next provider. Patient Signature:  ____________________________________________________________ Date:  ____________________________________________________________  
  
Michael Corbin Provider Signature:  ____________________________________________________________ Date:  ____________________________________________________________ More Information Call for appointment in 2 weeks After Your Delivery (the Postpartum Period): Care Instructions Your Care Instructions Congratulations on the birth of your baby. Like pregnancy, the  period can be a time of excitement, ninfa, and exhaustion. You may look at your wondrous little baby and feel happy. You may also be overwhelmed by your new sleep hours and new responsibilities. At first, babies often sleep during the days and are awake at night. They do not have a pattern or routine. They may make sudden gasps, jerk themselves awake, or look like they have crossed eyes. These are all normal, and they may even make you smile. In these first weeks after delivery, try to take good care of yourself. It may take 4 to 6 weeks to feel like yourself again, and possibly longer if you had a  birth. You will likely feel very tired for several weeks. Your days will be full of ups and downs, but lots of ninfa as well. Follow-up care is a key part of your treatment and safety. Be sure to make and go to all appointments, and call your doctor if you are having problems. It's also a good idea to know your test results and keep a list of the medicines you take. How can you care for yourself at home? Take care of your body after delivery · Use pads instead of tampons for the bloody flow that may last as long as 2 weeks. · Ease cramps with ibuprofen (Advil, Motrin).  Tylenol may also be used, follow label instructions. · Ease soreness of hemorrhoids and the area between your vagina and rectum with ice compresses or witch hazel pads. · Ease constipation by drinking lots of fluid and eating high-fiber foods. Ask your doctor about over-the-counter stool softeners. · Cleanse yourself with a gentle squeeze of warm water from a bottle instead of wiping with toilet paper. · Take a sitz bath in warm water several times a day. · Wear a good nursing bra. Ease sore and swollen breasts with warm, wet washcloths. · If you are not breastfeeding, use ice rather than heat for breast soreness. · Your period may not start for several months if you are breastfeeding. You may bleed more, and longer at first, than you did before you got pregnant. · Wait until you are healed (about 4 to 6 weeks) before you have sexual intercourse. Your doctor will tell you when it is okay to have sex. · Try not to travel with your baby for 5 or 6 weeks. If you take a long car trip, make frequent stops to walk around and stretch. Avoid exhaustion · Rest every day. Try to nap when your baby naps. · Ask another adult to be with you for a few days after delivery. · Plan for  if you have other children. · Stay flexible so you can eat at odd hours and sleep when you need to. Both you and your baby are making new schedules. · Plan small trips to get out of the house. Change can make you feel less tired. · Ask for help with housework, cooking, and shopping. Remind yourself that your job is to care for your baby. Know about help for postpartum depression · \"Baby blues\" are common for the first 1 to 2 weeks after birth. You may cry or feel sad or irritable for no reason. · Rest whenever you can. Being tired makes it harder to handle your emotions. · Go for walks with your baby. · Talk to your partner, friends, and family about your feelings.  
· If your symptoms last for more than a few weeks, or if you feel very depressed, ask your doctor for help. · Postpartum depression can be treated. Support groups and counseling can help. Sometimes medicine can also help. Stay healthy · Eat healthy foods so you have more energy, make good breast milk, and lose extra baby pounds. · If you breastfeed, avoid alcohol and drugs. Stay smoke-free. If you quit during pregnancy, congratulations. · Start daily exercise after 4 to 6 weeks, but rest when you feel tired. · Learn exercises to tone your belly. Do Kegel exercises to regain strength in your pelvic muscles. You can do these exercises while you stand or sit. ¨ Squeeze the same muscles you would use to stop your urine. Your belly and thighs should not move. ¨ Hold the squeeze for 3 seconds, and then relax for 3 seconds. ¨ Start with 3 seconds. Then add 1 second each week until you are able to squeeze for 10 seconds. ¨ Repeat the exercise 10 to 15 times for each session. Do three or more sessions each day. · Find a class for new mothers and new babies that has an exercise time. · If you had a  birth, give yourself a bit more time before you exercise, and be careful. When should you call for help? Call 911 anytime you think you may need emergency care. For example, call if: 
· You passed out (lost consciousness). Call your doctor now or seek immediate medical care if: 
· You have severe vaginal bleeding. This means you are passing blood clots and soaking through a pad each hour for 2 or more hours. · You are dizzy or lightheaded, or you feel like you may faint. · You have a fever. (101 or higher) · You have foul smelling discharge. · Your breasts become hard, hot &/or have red streaks. (This could be mastitis) You have new belly pain, or your pain gets worse. Watch closely for changes in your health, and be sure to contact your doctor if: 
· Your vaginal bleeding seems to be getting heavier. · You have new or worse vaginal discharge. · You feel sad, anxious, or hopeless for more than a few days. · You do not get better as expected. Where can you learn more? Go to http://rosalio-emely.info/. Enter A461 in the search box to learn more about \"After Your Delivery (the Postpartum Period): Care Instructions. \" Current as of: March 16, 2017 Content Version: 11.3 © 8881-5945 [employer]. Care instructions adapted under license by [employer] (which disclaims liability or warranty for this information). If you have questions about a medical condition or this instruction, always ask your healthcare professional. [address] any warranty or liability for your use of this information. Learning About Safe Sleep for Babies Why is safe sleep important? Enjoy your time with your baby, and know that you can do a few things to keep your baby safe. Following safe sleep guidelines can help prevent sudden infant death syndrome (SIDS) and reduce other sleep-related risks. SIDS is the death of a baby younger than 1 year with no known cause. Talk about these safety steps with your  providers, family, friends, and anyone else who spends time with your baby. Explain in detail what you expect them to do. Do not assume that people who care for your baby know these guidelines. What are the tips for safe sleep? Putting your baby to sleep · Put your baby to sleep on his or her back, not on the side or tummy. This reduces the risk of SIDS. · Once your baby learns to roll from the back to the belly, you do not need to keep shifting your baby onto his or her back. But keep putting your baby down to sleep on his or her back. · Keep the room at a comfortable temperature so that your baby can sleep in lightweight clothes without a blanket.  Usually, the temperature is about right if an adult can wear a long-sleeved T-shirt and pants without feeling cold. Make sure that your baby doesn't get too warm. Your baby is likely too warm if he or she sweats or tosses and turns a lot. · Consider offering your baby a pacifier at nap time and bedtime if your doctor agrees. · The American Academy of Pediatrics recommends that you do not sleep with your baby in the bed with you. · When your baby is awake and someone is watching, allow your baby to spend some time on his or her belly. This helps your baby get strong and may help prevent flat spots on the back of the head. Cribs, cradles, bassinets, and bedding · For the first 6 months, have your baby sleep in a crib, cradle, or bassinet in the same room where you sleep. · Keep soft items and loose bedding out of the crib. Items such as blankets, stuffed animals, toys, and pillows could block your baby's mouth or trap your baby. Dress your baby in sleepers instead of using blankets. · Make sure that your baby's crib has a firm mattress (with a fitted sheet). Don't use bumper pads or other products that attach to crib slats or sides. They could block your baby's mouth or trap your baby. · Do not place your baby in a car seat, sling, swing, bouncer, or stroller to sleep. The safest place for a baby is in a crib, cradle, or bassinet that meets safety standards. What else is important to know? More about sudden infant death syndrome (SIDS) SIDS is very rare. In most cases, a parent or other caregiver puts the babywho seems healthydown to sleep and returns later to find that the baby has . No one is at fault when a baby dies of SIDS. A SIDS death cannot be predicted, and in many cases it cannot be prevented. Doctors do not know what causes SIDS. It seems to happen more often in premature and low-birth-weight babies. It also is seen more often in babies whose mothers did not get medical care during the pregnancy and in babies whose mothers smoke. Do not smoke or let anyone else smoke in the house or around your baby. Exposure to smoke increases the risk of SIDS. If you need help quitting, talk to your doctor about stop-smoking programs and medicines. These can increase your chances of quitting for good. Breastfeeding your child may help prevent SIDS. Be wary of products that are billed as helping prevent SIDS. Talk to your doctor before buying any product that claims to reduce SIDS risk. What to do while still pregnant · See your doctor regularly. Women who see a doctor early in and throughout their pregnancies are less likely to have babies who die of SIDS. · Eat a healthy, balanced diet, which can help prevent a premature baby or a baby with a low birth weight. · Do not smoke or let anyone else smoke in the house or around you. Smoking or exposure to smoke during pregnancy increases the risk of SIDS. If you need help quitting, talk to your doctor about stop-smoking programs and medicines. These can increase your chances of quitting for good. · Do not drink alcohol or take illegal drugs. Alcohol or drug use may cause your baby to be born early. Follow-up care is a key part of your child's treatment and safety. Be sure to make and go to all appointments, and call your doctor if your child is having problems. It's also a good idea to know your child's test results and keep a list of the medicines your child takes. Where can you learn more? Go to http://rosalio-emely.info/. Enter J530 in the search box to learn more about \"Learning About Safe Sleep for Babies. \" Current as of: May 4, 2017 Content Version: 11.3 © 8323-7893 [employer]. Care instructions adapted under license by [employer] (which disclaims liability or warranty for this information).  If you have questions about a medical condition or this instruction, always ask your healthcare professional. [employer] disclaims any warranty or liability for your use of this information. Shaken Baby Syndrome: Care Instructions Your Care Instructions If you want to save this information but don't think it is safe to take it home, see if a trusted friend can keep it for you. Plan ahead. Know who you can call for help, and memorize the phone number. Be careful online too. Your online activity may be seen by others. Do not use your personal computer or device to read about this topic. Use a safe computer such as one at work, a friend's house, or a [employer]. There is a big difference between normal play activities and violent movements that harm a child. Bouncing a child on a knee or gently tossing a child in the air does not cause shaken baby syndrome. Shaken baby syndrome is brain damage that occurs when a baby is shaken or is slammed or thrown against an object. It is a form of child abuse that occurs when the baby's caregiver loses control. Shaking a baby or striking a baby's head can cause bruising and bleeding to the brain. Caring for a baby can be trying at times. You may have periods of feeling overwhelmed, especially if your baby is crying. Many babies cry from 1 to 5 hours out of every 24 hours during the first few months of life. Some babies cry more. You can learn ways to help stay in control of your emotions when you feel stressed. Then you can be with your baby in a loving and healthy way. Follow-up care is a key part of your child's treatment and safety. Be sure to make and go to all appointments, and call your doctor if your child is having problems. It's also a good idea to know your child's test results and keep a list of the medicines your child takes. How can you care for your child at home? · Take steps to protect yourself from being stressed. ¨ Learn about how children develop so that you will understand why your child behaves as he or she does.  Talk to your doctor about parent education classes or books. ¨ Talk with other parents about the ways they cope with the demands of parenting. ¨ Ask for help when you need time for yourself. ¨ Take short breaks and naps whenever you can. · If your baby cries a lot, try these ways to take care of his or her needs or to remove yourself safely. ¨ Check to see if your baby is hungry or has a dirty diaper. ¨ Hold your baby to your chest while you take and release deep breaths. ¨ Swing, rock, or walk with your baby. Some babies love to be taken for car rides or stroller walks. ¨ Tell stories and sing songs to your baby, who loves to hear your voice. ¨ Let your baby cry alone for a few minutes if his or her needs are taken care of and he or she is in a safe place, such as a crib. Remove yourself to another room where you can breathe calmly and try to clear your head. Count to 10 with each breath. ¨ Talk to your doctor if your baby continues to cry for what seems to be no reason. · Try some steps for relieving stress in your life. There are self-help books and classes on yoga, relaxation techniques, and other ways to relieve stress. Counseling and anger management training help many parents adjust to new pressures. · Never shake a baby. Never slap or hit a baby. · Take steps to protect your child from abuse by others. ¨ Screen your potential  providers to find out their backgrounds and attitudes about . ¨ If you suspect child abuse and the child is not in immediate danger, contact your local child protection services or police. ¨ Do not confront someone who you suspect is a child abuser. This may cause more harm to the child. ¨ If you are concerned about a child's well-being, call the [facility] hotline at 5-333-4-A-CHILD (2-252.942.1914). When should you call for help? Call 911 anytime you think a child may need emergency care. For example, call if: · A child is unconscious or is having trouble breathing. · A baby has been shaken. It is extremely important that a shaken baby gets medical care right away. Call your doctor now or seek immediate medical care if: 
· You are concerned that you cannot control your actions around your child. · You are concerned that a child's caregiver cannot control his or her actions around a child. Watch closely for changes in your child's health, and be sure to contact your doctor if your child has any problems. Where can you learn more? Go to http://rosalio-emely.info/. Enter H891 in the search box to learn more about \"Shaken Baby Syndrome: Care Instructions. \" Current as of: July 26, 2016 Content Version: 11.3 © 0194-9875 [employer]. Care instructions adapted under license by [employer] (which disclaims liability or warranty for this information). If you have questions about a medical condition or this instruction, always ask your healthcare professional. [place] [address] any warranty or liability for your use of this information. Depression After Childbirth: Care Instructions Your Care Instructions Many women get the \"baby blues\" during the first few days after childbirth. You may lose sleep, feel irritable, and cry easily. You may feel happy one minute and sad the next. Hormone changes are one cause of these emotional changes. Also, the demands of a new baby, along with visits from relatives or other family needs, add to a mother's stress. The \"baby blues\" often peak around the fourth day. Then they ease up in less than 2 weeks. If your moodiness or anxiety lasts for more than 2 weeks, or if you feel like life is not worth living, you may have postpartum depression. This is different for each mother. Some mothers with serious depression may worry intensely about their infant's well-being. Others may feel distant from their child. Some mothers might even feel that they might harm their baby. A mother may have signs of paranoia, wondering if someone is watching her. Depression is not a sign of weakness. It is a medical condition that requires treatment. Medicine and counseling often work well to reduce depression. Talk to your doctor about taking antidepressant medicine while breastfeeding. Follow-up care is a key part of your treatment and safety. Be sure to make and go to all appointments, and call your doctor if you are having problems. It's also a good idea to know your test results and keep a list of the medicines you take. How do you know if you are depressed? With all the changes in your life, you may not know if you are depressed. Pregnancy sometimes causes changes in how you feel that are similar to the symptoms of depression. Symptoms of depression include: · Feeling sad or hopeless and losing interest in daily activities. These are the most common symptoms of depression. · Sleeping too much or not enough. · Feeling tired. You may feel as if you have no energy. · Eating too much or too little. · Writing or talking about death, such as writing suicide notes or talking about guns, knives, or pills. Keep the numbers for these national suicide hotlines: 7-525-499-TALK (4-958.400.1613) and 9-099-QNOWIIH (3-934.552.7522). If you or someone you know talks about suicide or feeling hopeless, get help right away. How can you care for yourself at home? · Be safe with medicines. Take your medicines exactly as prescribed. Call your doctor if you think you are having a problem with your medicine. · Eat a healthy diet so that you can keep up your energy. · Get regular daily exercise, such as walks, to help improve your mood. · Get as much sunlight as possible. Keep your shades and curtains open. Get outside as much as you can. · Avoid using alcohol or other substances to feel better. · Get as much rest and sleep as possible. Avoid doing too much. Being too tired can increase depression. · Play stimulating music throughout your day and soothing music at night. · Schedule outings and visits with friends and family. Ask them to call you regularly, so that you do not feel alone. · Ask for help with preparing food and other daily tasks. Family and friends are often happy to help a mother with a . · Be honest with yourself and those who care about you. Tell them about your struggle. · Join a support group of new mothers. No one can better understand the challenges of caring for a  than other new mothers. · If you feel like life is not worth living or are feeling hopeless, get help right away. Keep the numbers for these national suicide hotlines: 3-016-899-TALK (0-953.368.8583) and 7-494-CCBSZLF (8-983.238.9717). When should you call for help? Call 911 anytime you think you may need emergency care. For example, call if: 
· You feel you cannot stop from hurting yourself, your baby, or someone else. Call your doctor now or seek immediate medical care if: 
· You are having trouble caring for yourself or your baby. · You hear voices. Watch closely for changes in your health, and be sure to contact your doctor if: 
· You have problems with your depression medicine. · You do not get better as expected. Where can you learn more? Go to http://rosalio-emely.info/. Enter O791 in the search box to learn more about \"Depression After Childbirth: Care Instructions. \" Current as of: 2016 Content Version: 11.3 © 5129-5718 [employer]. Care instructions adapted under license by [employer] (which disclaims liability or warranty for this information). If you have questions about a medical condition or this instruction, always ask your healthcare professional. [address] any warranty or liability for your use of this information. Stress in Parents of Infants: Care Instructions Your Care Instructions Meeting the increased demands of being a new parent can be a big challenge. It is easy to get overtired and overwhelmed during the first weeks. What used to be a simple chore, such as buying groceries, is not so simple now. Plus, you have new chores, including feeding and changing your new baby. At the end of the day, you may be so tired that you feel like crying. Instead of looking forward to the next day, you may be dreading tomorrow. Like many new parents, you are burned out from the stress of having a new baby. Stress affects each of us differently, and the most effective ways to relieve it are different for each person. You can try different methods to find out which ones work best for you. As the weeks go by, you will begin to develop a rhythm with your baby. Tasks that now seem to take forever will become easier. Many women get the \"baby blues\" during the first few days after childbirth. If you are a new mother and the \"baby blues\" last more than a few days, call your doctor right away. Depression is a medical condition that requires treatment. Follow-up care is a key part of your treatment and safety. Be sure to make and go to all appointments, and call your doctor if you are having problems. It's also a good idea to know your test results and keep a list of the medicines you take. How can you care for yourself at home? · Be kind to yourself. Your new baby takes a lot of work, but he or she can give you a lot of pleasure too. Do not worry about housekeeping for a while. · Allow your friends to bring you meals or do chores. · Limit visitors to as few as you feel you can handle, or ask them not to visit for a while. Before they come, set a limit on how long they will stay. · Sleep when your baby sleeps. Even a short nap helps. · Find what triggers your stress, and avoid those things as much as you can.  
· If you breastfeed, learn how to collect and store some breast milk so your partner or  can feed the baby while you sleep. · Eat a balanced diet so you can keep up your energy. · Drink plenty of fluids throughout the day. · Avoid caffeine and alcohol. Caffeine is found in coffee, tea, cola drinks, chocolate, and other foods. · Limit medicines that can make you more tired, such as tranquilizers and cold and allergy medicines. · Get regular daily exercise, such as walks, to help improve your mood. Rest after you exercise. · Be honest with yourself and those who care about you. Tell them you are stressed and tired. · Talking to other new parents can help. Ask your doctor or child's doctor to suggest support groups for new parents. Hearing that someone else is having the same experiences you are can help a lot. · If you have the baby blues for more than a few days, call your doctor right away. When should you call for help? Call 911 anytime you think you may need emergency care. For example, call if: 
· You have thoughts of hurting yourself, your baby, or another person. Call your doctor now or seek immediate medical care if: 
· You are having trouble caring for yourself or your baby. Watch closely for changes in your health, and be sure to contact your doctor if you have any problems. Where can you learn more? Go to http://rosalio-emely.info/. Enter H142 in the search box to learn more about \"Stress in Parents of Infants: Care Instructions. \" Current as of: July 26, 2016 Content Version: 11.3 © 3942-1405 [employer]wise[employer] Incorporated. Care instructions adapted under license by [employer] (which disclaims liability or warranty for this information). If you have questions about a medical condition or this instruction, always ask your healthcare professional. [address] any warranty or liability for your use of this information.

## 2017-08-15 NOTE — PROGRESS NOTES
Pt arrived ambulatory to labor and delivery for scheduled induction of labor for IUGR. Pt denies leaking fluid, vaginal bleeding, epigastric pain, visual disturbances, headache. Pt oriented to room, placed on monitors.

## 2017-08-16 PROBLEM — Z3A.37 37 WEEKS GESTATION OF PREGNANCY: Status: ACTIVE | Noted: 2017-08-16

## 2017-08-16 PROCEDURE — 65270000029 HC RM PRIVATE

## 2017-08-16 PROCEDURE — 77030034849

## 2017-08-16 PROCEDURE — 74011250636 HC RX REV CODE- 250/636: Performed by: OBSTETRICS & GYNECOLOGY

## 2017-08-16 PROCEDURE — 77030020255 HC SOL INJ LR 1000ML BG

## 2017-08-16 RX ORDER — OXYTOCIN IN 5 % DEXTROSE 30/500 ML
PLASTIC BAG, INJECTION (ML) INTRAVENOUS
Status: DISPENSED
Start: 2017-08-16 | End: 2017-08-16

## 2017-08-16 RX ORDER — OXYTOCIN IN 5 % DEXTROSE 30/500 ML
.5-2 PLASTIC BAG, INJECTION (ML) INTRAVENOUS
Status: DISCONTINUED | OUTPATIENT
Start: 2017-08-16 | End: 2017-08-19 | Stop reason: HOSPADM

## 2017-08-16 RX ADMIN — Medication 2 MILLI-UNITS/MIN: at 11:13

## 2017-08-16 RX ADMIN — SODIUM CHLORIDE, SODIUM LACTATE, POTASSIUM CHLORIDE, AND CALCIUM CHLORIDE 125 ML/HR: 600; 310; 30; 20 INJECTION, SOLUTION INTRAVENOUS at 12:53

## 2017-08-16 RX ADMIN — PENICILLIN G POTASSIUM 2.5 MILLION UNITS: 20000000 POWDER, FOR SOLUTION INTRAVENOUS at 05:21

## 2017-08-16 RX ADMIN — PENICILLIN G POTASSIUM 2.5 MILLION UNITS: 20000000 POWDER, FOR SOLUTION INTRAVENOUS at 21:16

## 2017-08-16 RX ADMIN — PENICILLIN G POTASSIUM 2.5 MILLION UNITS: 20000000 POWDER, FOR SOLUTION INTRAVENOUS at 01:28

## 2017-08-16 RX ADMIN — SODIUM CHLORIDE, SODIUM LACTATE, POTASSIUM CHLORIDE, AND CALCIUM CHLORIDE 125 ML/HR: 600; 310; 30; 20 INJECTION, SOLUTION INTRAVENOUS at 04:31

## 2017-08-16 RX ADMIN — SODIUM CHLORIDE, SODIUM LACTATE, POTASSIUM CHLORIDE, AND CALCIUM CHLORIDE 125 ML/HR: 600; 310; 30; 20 INJECTION, SOLUTION INTRAVENOUS at 20:25

## 2017-08-16 RX ADMIN — PENICILLIN G POTASSIUM 2.5 MILLION UNITS: 20000000 POWDER, FOR SOLUTION INTRAVENOUS at 16:01

## 2017-08-16 RX ADMIN — PENICILLIN G POTASSIUM 2.5 MILLION UNITS: 20000000 POWDER, FOR SOLUTION INTRAVENOUS at 09:41

## 2017-08-16 NOTE — PROGRESS NOTES
Labor Progress Note  Patient seen, fetal heart rate and contraction pattern evaluated, patient examined. No data found. Physical Exam:  Cervical Exam:  1 cm dilated    40% effaced    -1 station    Presenting Part: cephalic  Membranes:  Intact. Vaginal bleeding noted. Uterine Activity: Frequency: Every 2 minutes. Pitocin just d/faiza but was previously at 8 mu/min. Fetal Heart Rate: Reassuring until 10 minutes ago when fetus had a series of late decels. Pitocin d/faiza, oxygen on, patient repositioned. Heart rate now with moderate variability, contractions continue. Assessment/Plan:  Will observe fetal heart tracing, restart Pitocin if no further decelerations.

## 2017-08-16 NOTE — H&P
History & Physical    Name: Alfredo Bradshaw MRN: [id number]  SSN: [id number]    YOB: 1995  Age: 24 y.o. Sex: female      Estimated Date of Delivery: 17  OB History      Para Term  AB Living    2 0 0 0 1 0    SAB TAB Ectopic Molar Multiple Live Births    1 0 0  0           Ms. Conte is admitted with pregnancy at 37w6d for induction of labor due to IUGR. No complaints. Normal fetal movement. Her prenatal course was complicated by   Patient Active Problem List   Diagnosis Code    Thrombocytopenia affecting pregnancy ([facility] [email or web address] O99.119, D69.6    Rh negative state in antepartum period O09.899    IUGR (intrauterine growth restriction) affecting care of mother O41.80    Group B streptococcal carriage complicating pregnancy G90.512   . Please see prenatal records for details. Past Medical History:   Diagnosis Date    Anxiety     Asthma     Depression     anxiety     Past Surgical History:   Procedure Laterality Date    HX HERNIA REPAIR  childhood     Family hx:  noncontributory  Social History     Occupational History    Not on file. Social History Main Topics    Smoking status: Never Smoker    Smokeless tobacco: Never Used    Alcohol use No    Drug use: No    Sexual activity: Yes     Partners: Male     Birth control/ protection: None     Family History   Problem Relation Age of Onset    No Known Problems Mother     Diabetes Father     No Known Problems Maternal Grandmother     No Known Problems Maternal Grandfather     No Known Problems Paternal Grandmother     No Known Problems Paternal Grandfather        Allergies   Allergen Reactions    Tramadol Itching     Prior to Admission medications    Medication Sig Start Date End Date Taking? Authorizing Provider   pnv w/o calcium-iron fum-fa 27-1 mg tab Take  by mouth. Yes Historical Provider        Review of Systems: Pertinent items are noted in HPI.     Objective:     Vitals:  Vitals:    08/15/17 1958 08/15/17 2005 08/15/17 2145 08/15/17 2200   BP: 135/74  111/81 118/64   Pulse: 82  75 78   Temp: 98.7 °F (37.1 °C)      Weight:  230 lb (104.3 kg)     Height:  5' 4\" (1.626 m)          Physical Exam:  Gen:  NAD  Chest:  Normal respiratory effort without use of accessory muscles. Abdomen:  Gravid, NT  Initial exam findings per RN:   cervix 2/40/-2. Membranes intact. Remote EFM reviewed:   mod variability, accels present, rare variable decels. [place]:  irregular. Prenatal Labs:   Lab Results   Component Value Date/Time    Rubella, External IMMUNE 04/04/2017    GrBStrep, External POSITIVE 08/04/2017         Assessment/Plan:      Thrombocytopenia affecting pregnancy ([address] O99.119, D69.6    Rh negative state in antepartum period O09.899    IUGR (intrauterine growth restriction) affecting care of mother O41.80    Group B streptococcal carriage complicating pregnancy R81.516    37 weeks gestation of pregnancy Z3A.37     Group B Strep was positive, will treat prophylactically with penicillin. Cervidil overnight  Plan of care discussed. Patient expressed understanding and agreement.         Signed By:  Alf Quinn DO     August 16, 2017

## 2017-08-16 NOTE — PROGRESS NOTES
0700 assumed care off patient 0930 cervidil removed sve ft/thick/high, 1114 pitocin started as ordered via pump to augment labor verified with Marily Calderon rn  1640 Dr Arias Angeles at bedside for tracing. 1810 Patient back from  states \"starting to feel contractions in lower back \" Monitors applied on , toco adjusted to assess contraction pattern, reassuring fhr noted.

## 2017-08-16 NOTE — PROGRESS NOTES
Admitting pt for Indux due to IUGR. Fht reassurring ,POC DISCUSSED AND PT AWARE.  0630. P RESTED, SLEPT ON AND OFF  THROUGH THE NIGHT, PT NOT UNCOMFORTABLE.  SO @ BEDSIDE

## 2017-08-17 ENCOUNTER — ANESTHESIA EVENT (OUTPATIENT)
Dept: LABOR AND DELIVERY | Age: 22
DRG: 540 | End: 2017-08-17
Payer: MEDICAID

## 2017-08-17 ENCOUNTER — ANESTHESIA (OUTPATIENT)
Dept: LABOR AND DELIVERY | Age: 22
DRG: 540 | End: 2017-08-17
Payer: MEDICAID

## 2017-08-17 LAB
BASOPHILS # BLD: 0 K/UL (ref 0–0.06)
BASOPHILS NFR BLD: 0 % (ref 0–2)
DIFFERENTIAL METHOD BLD: ABNORMAL
EOSINOPHIL # BLD: 0.1 K/UL (ref 0–0.4)
EOSINOPHIL NFR BLD: 1 % (ref 0–5)
ERYTHROCYTE [DISTWIDTH] IN BLOOD BY AUTOMATED COUNT: 13.4 % (ref 11.6–14.5)
HCT VFR BLD AUTO: 38.7 % (ref 35–45)
HGB BLD-MCNC: 12.9 G/DL (ref 12–16)
LYMPHOCYTES # BLD: 2.3 K/UL (ref 0.9–3.6)
LYMPHOCYTES NFR BLD: 27 % (ref 21–52)
MCH RBC QN AUTO: 29 PG (ref 24–34)
MCHC RBC AUTO-ENTMCNC: 33.3 G/DL (ref 31–37)
MCV RBC AUTO: 87 FL (ref 74–97)
MONOCYTES # BLD: 0.7 K/UL (ref 0.05–1.2)
MONOCYTES NFR BLD: 8 % (ref 3–10)
NEUTS SEG # BLD: 5.3 K/UL (ref 1.8–8)
NEUTS SEG NFR BLD: 64 % (ref 40–73)
PLATELET # BLD AUTO: 92 K/UL (ref 135–420)
RBC # BLD AUTO: 4.45 M/UL (ref 4.2–5.3)
WBC # BLD AUTO: 8.3 K/UL (ref 4.6–13.2)

## 2017-08-17 PROCEDURE — 77030020255 HC SOL INJ LR 1000ML BG

## 2017-08-17 PROCEDURE — 77030007866 HC KT SPN ANES BBMI -B: Performed by: ANESTHESIOLOGY

## 2017-08-17 PROCEDURE — 74011250637 HC RX REV CODE- 250/637

## 2017-08-17 PROCEDURE — 77010026065 HC OXYGEN MINIMUM MEDICAL AIR

## 2017-08-17 PROCEDURE — 65270000029 HC RM PRIVATE

## 2017-08-17 PROCEDURE — 74011250636 HC RX REV CODE- 250/636: Performed by: OBSTETRICS & GYNECOLOGY

## 2017-08-17 PROCEDURE — 74011250636 HC RX REV CODE- 250/636

## 2017-08-17 PROCEDURE — 77030011640 HC PAD GRND REM COVD -A: Performed by: OBSTETRICS & GYNECOLOGY

## 2017-08-17 PROCEDURE — 77030018842 HC SOL IRR SOD CL 9% BAXT -A: Performed by: OBSTETRICS & GYNECOLOGY

## 2017-08-17 PROCEDURE — 85025 COMPLETE CBC W/AUTO DIFF WBC: CPT | Performed by: OBSTETRICS & GYNECOLOGY

## 2017-08-17 PROCEDURE — 77030005515 HC CATH URETH FOL14 BARD -B: Performed by: OBSTETRICS & GYNECOLOGY

## 2017-08-17 PROCEDURE — 74011000250 HC RX REV CODE- 250

## 2017-08-17 PROCEDURE — 76060000078 HC EPIDURAL ANESTHESIA: Performed by: OBSTETRICS & GYNECOLOGY

## 2017-08-17 PROCEDURE — C1765 ADHESION BARRIER: HCPCS | Performed by: OBSTETRICS & GYNECOLOGY

## 2017-08-17 PROCEDURE — 77030002974 HC SUT PLN J&J -A: Performed by: OBSTETRICS & GYNECOLOGY

## 2017-08-17 PROCEDURE — 77030002933 HC SUT MCRYL J&J -A: Performed by: OBSTETRICS & GYNECOLOGY

## 2017-08-17 PROCEDURE — 76010000392 HC C SECN EA ADDL 0.5 HR: Performed by: OBSTETRICS & GYNECOLOGY

## 2017-08-17 PROCEDURE — 75410000003 HC RECOV DEL/VAG/CSECN EA 0.5 HR: Performed by: OBSTETRICS & GYNECOLOGY

## 2017-08-17 PROCEDURE — 75410000003 HC RECOV DEL/VAG/CSECN EA 0.5 HR

## 2017-08-17 PROCEDURE — 77030002935 HC SUT MCRYL J&J -C: Performed by: OBSTETRICS & GYNECOLOGY

## 2017-08-17 PROCEDURE — 76010000391 HC C SECN FIRST 1 HR: Performed by: OBSTETRICS & GYNECOLOGY

## 2017-08-17 PROCEDURE — 77030032490 HC SLV COMPR SCD KNE COVD -B: Performed by: OBSTETRICS & GYNECOLOGY

## 2017-08-17 PROCEDURE — 74011250636 HC RX REV CODE- 250/636: Performed by: NURSE ANESTHETIST, CERTIFIED REGISTERED

## 2017-08-17 PROCEDURE — 77030031139 HC SUT VCRL2 J&J -A: Performed by: OBSTETRICS & GYNECOLOGY

## 2017-08-17 PROCEDURE — 75410000002 HC LABOR FEE PER 1 HR

## 2017-08-17 PROCEDURE — 77030027138 HC INCENT SPIROMETER -A

## 2017-08-17 RX ORDER — FENTANYL CITRATE 50 UG/ML
INJECTION, SOLUTION INTRAMUSCULAR; INTRAVENOUS AS NEEDED
Status: DISCONTINUED | OUTPATIENT
Start: 2017-08-17 | End: 2017-08-17 | Stop reason: HOSPADM

## 2017-08-17 RX ORDER — BUPIVACAINE HYDROCHLORIDE 7.5 MG/ML
INJECTION, SOLUTION INTRASPINAL AS NEEDED
Status: DISCONTINUED | OUTPATIENT
Start: 2017-08-17 | End: 2017-08-17 | Stop reason: HOSPADM

## 2017-08-17 RX ORDER — NALOXONE HYDROCHLORIDE 0.4 MG/ML
0.2 INJECTION, SOLUTION INTRAMUSCULAR; INTRAVENOUS; SUBCUTANEOUS
Status: DISCONTINUED | OUTPATIENT
Start: 2017-08-17 | End: 2017-08-19 | Stop reason: HOSPADM

## 2017-08-17 RX ORDER — OXYTOCIN/RINGER'S LACTATE 20/1000 ML
PLASTIC BAG, INJECTION (ML) INTRAVENOUS
Status: DISCONTINUED | OUTPATIENT
Start: 2017-08-17 | End: 2017-08-17 | Stop reason: HOSPADM

## 2017-08-17 RX ORDER — MORPHINE SULFATE 1 MG/ML
INJECTION, SOLUTION EPIDURAL; INTRATHECAL; INTRAVENOUS AS NEEDED
Status: DISCONTINUED | OUTPATIENT
Start: 2017-08-17 | End: 2017-08-17 | Stop reason: HOSPADM

## 2017-08-17 RX ORDER — EPHEDRINE SULFATE/0.9% NACL/PF 25 MG/5 ML
SYRINGE (ML) INTRAVENOUS AS NEEDED
Status: DISCONTINUED | OUTPATIENT
Start: 2017-08-17 | End: 2017-08-17 | Stop reason: HOSPADM

## 2017-08-17 RX ORDER — OXYCODONE AND ACETAMINOPHEN 5; 325 MG/1; MG/1
1-2 TABLET ORAL
Status: DISCONTINUED | OUTPATIENT
Start: 2017-08-17 | End: 2017-08-19 | Stop reason: HOSPADM

## 2017-08-17 RX ORDER — OXYTOCIN/RINGER'S LACTATE 20/1000 ML
125 PLASTIC BAG, INJECTION (ML) INTRAVENOUS CONTINUOUS
Status: DISCONTINUED | OUTPATIENT
Start: 2017-08-17 | End: 2017-08-19 | Stop reason: HOSPADM

## 2017-08-17 RX ORDER — ONDANSETRON 2 MG/ML
4 INJECTION INTRAMUSCULAR; INTRAVENOUS
Status: DISCONTINUED | OUTPATIENT
Start: 2017-08-17 | End: 2017-08-19 | Stop reason: HOSPADM

## 2017-08-17 RX ORDER — NALBUPHINE HYDROCHLORIDE 10 MG/ML
5 INJECTION, SOLUTION INTRAMUSCULAR; INTRAVENOUS; SUBCUTANEOUS
Status: DISPENSED | OUTPATIENT
Start: 2017-08-17 | End: 2017-08-18

## 2017-08-17 RX ORDER — CEFAZOLIN SODIUM 2 G/50ML
2 SOLUTION INTRAVENOUS ONCE
Status: COMPLETED | OUTPATIENT
Start: 2017-08-17 | End: 2017-08-17

## 2017-08-17 RX ORDER — SODIUM CHLORIDE, SODIUM LACTATE, POTASSIUM CHLORIDE, CALCIUM CHLORIDE 600; 310; 30; 20 MG/100ML; MG/100ML; MG/100ML; MG/100ML
125 INJECTION, SOLUTION INTRAVENOUS CONTINUOUS
Status: DISPENSED | OUTPATIENT
Start: 2017-08-17 | End: 2017-08-18

## 2017-08-17 RX ORDER — ACETAMINOPHEN 325 MG/1
650 TABLET ORAL
Status: DISCONTINUED | OUTPATIENT
Start: 2017-08-17 | End: 2017-08-19 | Stop reason: HOSPADM

## 2017-08-17 RX ORDER — SODIUM CHLORIDE 0.9 % (FLUSH) 0.9 %
5-10 SYRINGE (ML) INJECTION AS NEEDED
Status: DISCONTINUED | OUTPATIENT
Start: 2017-08-17 | End: 2017-08-19 | Stop reason: HOSPADM

## 2017-08-17 RX ORDER — ZOLPIDEM TARTRATE 5 MG/1
5 TABLET ORAL
Status: DISCONTINUED | OUTPATIENT
Start: 2017-08-17 | End: 2017-08-19 | Stop reason: HOSPADM

## 2017-08-17 RX ORDER — IBUPROFEN 400 MG/1
800 TABLET ORAL
Status: DISCONTINUED | OUTPATIENT
Start: 2017-08-18 | End: 2017-08-19 | Stop reason: HOSPADM

## 2017-08-17 RX ORDER — SODIUM CHLORIDE, SODIUM LACTATE, POTASSIUM CHLORIDE, CALCIUM CHLORIDE 600; 310; 30; 20 MG/100ML; MG/100ML; MG/100ML; MG/100ML
INJECTION, SOLUTION INTRAVENOUS
Status: DISCONTINUED | OUTPATIENT
Start: 2017-08-17 | End: 2017-08-17 | Stop reason: HOSPADM

## 2017-08-17 RX ORDER — KETOROLAC TROMETHAMINE 30 MG/ML
30 INJECTION, SOLUTION INTRAMUSCULAR; INTRAVENOUS EVERY 6 HOURS
Status: ACTIVE | OUTPATIENT
Start: 2017-08-17 | End: 2017-08-18

## 2017-08-17 RX ORDER — FACIAL-BODY WIPES
10 EACH TOPICAL
Status: DISCONTINUED | OUTPATIENT
Start: 2017-08-17 | End: 2017-08-19 | Stop reason: HOSPADM

## 2017-08-17 RX ORDER — SODIUM CHLORIDE 0.9 % (FLUSH) 0.9 %
5-10 SYRINGE (ML) INJECTION EVERY 8 HOURS
Status: DISCONTINUED | OUTPATIENT
Start: 2017-08-17 | End: 2017-08-19 | Stop reason: HOSPADM

## 2017-08-17 RX ORDER — PROMETHAZINE HYDROCHLORIDE 25 MG/ML
25 INJECTION, SOLUTION INTRAMUSCULAR; INTRAVENOUS
Status: DISCONTINUED | OUTPATIENT
Start: 2017-08-17 | End: 2017-08-19 | Stop reason: HOSPADM

## 2017-08-17 RX ORDER — TRISODIUM CITRATE DIHYDRATE AND CITRIC ACID MONOHYDRATE 500; 334 MG/5ML; MG/5ML
SOLUTION ORAL
Status: COMPLETED
Start: 2017-08-17 | End: 2017-08-17

## 2017-08-17 RX ORDER — DIPHENHYDRAMINE HYDROCHLORIDE 50 MG/ML
25 INJECTION, SOLUTION INTRAMUSCULAR; INTRAVENOUS
Status: DISCONTINUED | OUTPATIENT
Start: 2017-08-17 | End: 2017-08-19 | Stop reason: HOSPADM

## 2017-08-17 RX ORDER — SIMETHICONE 80 MG
80 TABLET,CHEWABLE ORAL
Status: DISCONTINUED | OUTPATIENT
Start: 2017-08-17 | End: 2017-08-19 | Stop reason: HOSPADM

## 2017-08-17 RX ORDER — HYDROMORPHONE HYDROCHLORIDE 1 MG/ML
0.5 INJECTION, SOLUTION INTRAMUSCULAR; INTRAVENOUS; SUBCUTANEOUS
Status: DISCONTINUED | OUTPATIENT
Start: 2017-08-17 | End: 2017-08-19 | Stop reason: HOSPADM

## 2017-08-17 RX ADMIN — HYDROMORPHONE HYDROCHLORIDE 0.5 MG: 1 INJECTION, SOLUTION INTRAMUSCULAR; INTRAVENOUS; SUBCUTANEOUS at 20:41

## 2017-08-17 RX ADMIN — DIPHENHYDRAMINE HYDROCHLORIDE 25 MG: 50 INJECTION, SOLUTION INTRAMUSCULAR; INTRAVENOUS at 13:13

## 2017-08-17 RX ADMIN — SODIUM CHLORIDE, SODIUM LACTATE, POTASSIUM CHLORIDE, AND CALCIUM CHLORIDE 500 ML: 600; 310; 30; 20 INJECTION, SOLUTION INTRAVENOUS at 03:50

## 2017-08-17 RX ADMIN — NALBUPHINE HYDROCHLORIDE 5 MG: 10 INJECTION, SOLUTION INTRAMUSCULAR; INTRAVENOUS; SUBCUTANEOUS at 16:25

## 2017-08-17 RX ADMIN — Medication 10 MG: at 08:01

## 2017-08-17 RX ADMIN — HYDROMORPHONE HYDROCHLORIDE 0.5 MG: 1 INJECTION, SOLUTION INTRAMUSCULAR; INTRAVENOUS; SUBCUTANEOUS at 11:03

## 2017-08-17 RX ADMIN — ONDANSETRON 4 MG: 2 INJECTION INTRAMUSCULAR; INTRAVENOUS at 10:58

## 2017-08-17 RX ADMIN — SODIUM CITRATE AND CITRIC ACID MONOHYDRATE 30 ML: 500; 334 SOLUTION ORAL at 07:46

## 2017-08-17 RX ADMIN — CEFAZOLIN SODIUM 2 G: 2 SOLUTION INTRAVENOUS at 08:05

## 2017-08-17 RX ADMIN — BUPIVACAINE HYDROCHLORIDE 1.5 ML: 7.5 INJECTION, SOLUTION INTRASPINAL at 07:59

## 2017-08-17 RX ADMIN — PENICILLIN G POTASSIUM 2.5 MILLION UNITS: 20000000 POWDER, FOR SOLUTION INTRAVENOUS at 04:09

## 2017-08-17 RX ADMIN — Medication: at 08:24

## 2017-08-17 RX ADMIN — MORPHINE SULFATE 0.2 MG: 1 INJECTION, SOLUTION EPIDURAL; INTRATHECAL; INTRAVENOUS at 07:59

## 2017-08-17 RX ADMIN — FENTANYL CITRATE 25 MCG: 50 INJECTION, SOLUTION INTRAMUSCULAR; INTRAVENOUS at 07:59

## 2017-08-17 RX ADMIN — Medication 10 MG: at 08:04

## 2017-08-17 RX ADMIN — HYDROMORPHONE HYDROCHLORIDE 0.5 MG: 1 INJECTION, SOLUTION INTRAMUSCULAR; INTRAVENOUS; SUBCUTANEOUS at 17:15

## 2017-08-17 RX ADMIN — SODIUM CHLORIDE, SODIUM LACTATE, POTASSIUM CHLORIDE, CALCIUM CHLORIDE: 600; 310; 30; 20 INJECTION, SOLUTION INTRAVENOUS at 07:51

## 2017-08-17 RX ADMIN — Medication 125 ML/HR: at 10:30

## 2017-08-17 RX ADMIN — SODIUM CHLORIDE, SODIUM LACTATE, POTASSIUM CHLORIDE, AND CALCIUM CHLORIDE 125 ML/HR: 600; 310; 30; 20 INJECTION, SOLUTION INTRAVENOUS at 18:01

## 2017-08-17 NOTE — ANESTHESIA PROCEDURE NOTES
Spinal Block    Start time: 8/17/2017 7:55 AM  End time: 8/17/2017 8:00 AM  Performed by: STEPHANIE Messina  Authorized by: [employer]     Pre-procedure:   Indications: primary anesthetic  Preanesthetic Checklist: patient identified, risks and benefits discussed, anesthesia consent, site marked, patient being monitored and timeout performed    Timeout Time: 07:57          Spinal Block:   Patient Position:  Seated  Prep Region:  Lumbar  Prep: chlorhexidine      Location:  L3-4  Technique:  Single shot  Local:  Lidocaine 1%      Needle:   Needle Type:  Pencan  Needle Gauge:  24 G  Attempts:  1      Events: CSF confirmed and no paresthesia        Assessment:  Insertion:  Uncomplicated  Patient tolerance:  Patient tolerated the procedure well with no immediate complications

## 2017-08-17 NOTE — PROGRESS NOTES
Bedside and Verbal shift change report given to Katlyn Horan RN (oncoming nurse) by Abby Reene RN (offgoing nurse). Report included the following information SBAR, Kardex, Intake/Output, MAR, Recent Results and Med Rec Status.

## 2017-08-17 NOTE — ROUTINE PROCESS
Verbal shift change report given to Mary Hubbard RN (oncoming nurse) by HALI Watt RN (offgoing nurse). Report included the following information SBAR, Kardex, Intake/Output, MAR and Recent Results.

## 2017-08-17 NOTE — ANESTHESIA PREPROCEDURE EVALUATION
Anesthetic History   No history of anesthetic complications            Review of Systems / Medical History  Patient summary reviewed and pertinent labs reviewed    Pulmonary            Asthma        Neuro/Psych   Within defined limits           Cardiovascular  Within defined limits                     GI/Hepatic/Renal  Within defined limits              Endo/Other        Obesity     Other Findings   Comments:   Risk Factors for Postoperative nausea/vomiting:       History of postoperative nausea/vomiting? NO       Female? YES       Motion sickness? NO       Intended opioid administration for postoperative analgesia? YES      Smoking Abstinence  Current Smoker? NO  Elective Surgery? NO  Seen preoperatively by anesthesiologist or proxy prior to day of surgery? YES  Pt abstained from smoking 24 hours prior to anesthesia?  N/A           Physical Exam    Airway  Mallampati: II  TM Distance: 4 - 6 cm  Neck ROM: normal range of motion   Mouth opening: Normal     Cardiovascular               Dental  No notable dental hx       Pulmonary                 Abdominal  GI exam deferred       Other Findings            Anesthetic Plan    ASA: 2  Anesthesia type: MAC and spinal            Anesthetic plan and risks discussed with: Patient

## 2017-08-17 NOTE — ROUTINE PROCESS
0140 Assumed care of patient, received report from Carlin Nickerson RN.  0200 Spoke with Dr. Amada Pedroza MD notified of variable decels and Pitocin being turned off, contraction pattern and fetal heart tracing also reviewed. MD gave order to restart Pitocin @ 2mu.

## 2017-08-17 NOTE — PROGRESS NOTES
4149 - Discussed the decision to go ahead with a  due to failure to tolerate contractions. Patient agrees with plan of care. Dr. Len Ortiz and SA in route. 0835 - Patient shaved and abdomen cleansed per protocol. 7644 - Bedside and Verbal shift change report given to Leda Goodrich RN (oncoming nurse) by YVROSE Gutierrez RN (offgoing nurse). Report included the following information SBAR, Procedure Summary, Intake/Output and Recent Results.

## 2017-08-17 NOTE — PROGRESS NOTES
~~ 1125 ASSUME CARE OF PT RESTING IN BED, SR UP X2, CB IN REACH. FOB, G-MA & BABY IN- PT ORIENTED TO ROOM & WHITE BOARD. REVIEWED QUIET TIME. IV, HINDS & INCISION CHECKED. SEQ STOCKINGS ON & PUMPING. PAIN SCALE REVIEWED- PT RATES PAIN CURRENTLY 3/10 & DENIES NEED FOR ADDITIONAL PAIN MED-- TOLD TO ASK WHEN NEEDED. PT DENIES NAUSEA (HAS HAD MED), HAS MILD ITCHING BUT DECLINES OFFER OF MED- TOLD TO ASK IF DESIRED. REVIEWED S/S TO REPORT-   ASSESSMENT AS CHARTED-   PT GIVEN ICE CHIPS- ENCOURAGED SLOW PO, STOP W/ NAUSEA & REVIEWED ADVANCE AS TOLERATED. PT GIVEN INCENTIVE SPIROMETER-- PT ABLE TO DEMONSTRATE WELL PAST 2500-- REVIEWED FREQ-     ~~1135 NSY RN IN TO TAKE BABY TO NST FOR PEDS CHECK & MONITORING (D/T MURMUR)    ~~1145 CLEAR LIQUID LUNCH TRAY SERVED- PT TAKING SLOWLY. PER PTS MOM'S REQ PROVIDED A BASIN, BATH-N-A-BAG, WASH CLOTHS AS SHE WANTS TO CLEAN PT UP-- DECLINES OFFER TO HELP.    ~~1215 BABY BACK TO THE ROOM-   REVIEWED 2nd ADULT MUST BE PRESENT FOR BABY TO ROOM IN    ~~ 1245 PT TOLERATING CLEARS-- CONSUMING SLOWLY W/O NAUSEA--    ~~1313 PT CALLS OUT FOR ITCH MED- BENADRYL GIVEN.     ~~1330  QUIET TIME    ~~1410 PT SAYS ITCHING BETTER    ~~1515 PT RESTING- BABY SLEEPING- NO NEEDS VOICED. VS CHECKED    ~~ 1600 FULL LIQUID DINNER TRAY SERVED    ~~1625 ITCHING HAS RETURNED- PT TRYING NOT TO SCRATCH- NUBAIN GIVEN AS IT IS NOT TIME FOR BENADRYL--      ~~1655 MED EFFECTIVE & ITCHING NEARLY GONE    ~~ 1715 PT CALLS OUT REQUESTING PAIN MED- 0.5MG DILAUDID GIVEN- VISITORS LEAVING.  FOB & BABY REMAIN    ~~1800 PAIN MED EFFECTIVE-- PT ASSISTED W/ BREAST FEEDING-  BABY NOT INTERESTED-  SUGGESTED TRYING AGAIN IN 30 MIN    ~~1830 REVIEWED W/ PT TO OFFER BABY THE BREAST Q 2-3 HRS & DOCUMENT ATTEMPTS- REVIEWED YELLOW DIAPER STRIPE & WATCHING FOR OUTPUT

## 2017-08-17 NOTE — PROGRESS NOTES
1910 - Bedside and Verbal shift change report given to YVROSE Johnston RN (oncoming nurse) by Efrem Quiroz RN (offgoing nurse). Report included the following information SBAR, Procedure Summary, Intake/Output, MAR and Recent Results. 1925 - Assumed care of patient. Patient in room sitting up in bed seemed comfortable. Spouse and mother at bedside. Introduced myself. Updated whiteboard. Explained plan of care for the shift. 2010 - Completed head to toe assessment. Assessment is uneventful. Patient denies pain, bleeding or leaking of fluid. Patient states she is not feeling her contractions but does feel some tightness. Readjusted monitors. Will monitor current contraction pattern prior to restarting pitocin per ordered. 2102 - Dr. Governor Hood at bedside to assess for fetal position.

## 2017-08-17 NOTE — PROGRESS NOTES
Contacted by nursing and notified that the fetus has again had late decelerations and Pitocin has been discontinued. Cervical exam continues at 1 cm per nursing. Instructed them to contact anesthesia for a primary  section secondary to fetal intolerance of labor, but was then contacted by the ED for a consult. The patient there requires an emergent procedure and fetal heart tones are now reassuring with the Pitocin off. Discussed with the patient that I feel it is safe to delay the C section temporarily until after the emergent procedure is finished, and she is in agreement with that plan. Anesthesia aware, orders written.

## 2017-08-17 NOTE — ROUTINE PROCESS
TRANSFER - IN REPORT:    Verbal report received from [facility] RN(name) on Elyssa Conte  being received from PACU(unit) for routine progression of care      Report consisted of patients Situation, Background, Assessment and   Recommendations(SBAR). Information from the following report(s) SBAR, OR Summary, Procedure Summary, Intake/Output, MAR and Recent Results was reviewed with the receiving nurse. Opportunity for questions and clarification was provided. Assessment completed upon patients arrival to unit and care assumed.

## 2017-08-17 NOTE — OP NOTES
Section Delivery Procedure Note      Name: Viviane Palm   Medical Record Number: [id number]   YOB: 1995  Today's Date: 2017      Preoperative Diagnosis: Fetal Intolerance of Labor    Postoperative Diagnosis: same, plus liveborn male infant    Procedure: Low Cervical Transverse Procedure(s):   SECTION    Surgeon(s):  Bailey Blackwood DO    Anesthesia: Epidural    Prophylactic Antibiotics: Ancef  EBL: 500 ml  IVF: 750 ml LR       Fetal Description: dsouza male    Birth Information:   Information for the patient's :  Dg, Baby Boy Velvet Inch [[id number]]   One Minute Apgar: 8 (Filed from Delivery Summary)  Five  Minute Apgar: 9 (Filed from Delivery Summary)      Umbilical Cord: 3 vessels present and Cord blood sent to lab for type, Rh, and Helena' test    Placenta:  expressed    Specimens: * No specimens in log *            Complications:  none    Operative Findings: At the time of the surgery a live female delivered in vertex presentation in [facility]  with an APGAR of  8 and 9 at 1 and 5 minutes respectively. Amniotic fluid was Clear. Placenta and membranes delivered complete cord had 3 vessels. inspection of the uterus, fallopian tubes and ovaries revealed normal anatomy. Procedure Details: The patient was seen in the Holding Room. The risks, benefits, complications, treatment options, and expected outcomes were discussed with the patient. The patient concurred with the proposed plan, giving informed consent. The site of surgery properly noted/marked. The patient was taken to Operating Room , identified as Elyssa Conte and the procedure verified as  Delivery. A Time Out was held and the above information confirmed. The patient was taken to the operating room, where spinal anesthesia was administered and found to be adequate. Branch catheter had been placed using sterile technique.  The patient is in the supine wedged position. The patient was prepped and draped in the normal sterile fashion. The abdomen was entered using the Pfannenstiel technique. The peritoneum was entered sharply well superior to the bladder. The bladder blade was then inserted. The vesicouterine and peritoneum was identified and entered sharply with Metzenbaum scissors. The bladder flap was then created sharply and the bladder blade was reinserted. A low transverse uterine incision was made with the scalpel and extended laterally with blunt finger dissection. Amniotomy was performed and the fluid was large amount clear. A hand was inserted in the uterus and the baby is in vertex presentation in a Right Occiput Anterior. The babys head was then delivered atraumatically. The nose, mouth and hypopharynx were suctioned. The cord was clamped and cut and the baby was handed off to the waiting  care unit staff. Cord blood was obtained. Placenta and membranes were expressed from the uterus. The uterus was exteriorized and cleared of all clots and debris. The uterus was explored and found to be clear. The uterine incision was closed in two layers; first continuous running locked suture of  number 0 Chromic, second layer of imbricating layer of similar suture. good hemostasis was assured. The posterior cul-de-sac was irrigated with warm normal saline. Good hemostasis was again reassured and the uterus was returned to the abdomen. The anterior pelvis was irrigated with warm normal saline and good hemostasis was again reassured throughout. The rectus muscles were reapproximated in the midline with a series of figure of eight stitches with 3-0 chromic. The fascia was closed with 0 Vicryl suture in a running fashion. Good hemostasis was assured. The subcuticular layers were reapproximated with 3-0 Vicryl in running fashion. The skin was closed with a 3-0 Vicryl in a subcuticular fashion. Dermabond was applied.  The patient tolerated the procedure well. Sponge, lap, and needle counts were correct times three and the patient and baby were taken to recovery/postpartum room in stable condition.       Infant A Delivery Info  Delivery Date/Time: 8/17/17 at [address]  Sex:  male    Signed: Michell Villalobos DO      August 17, 2017

## 2017-08-18 LAB
HCT VFR BLD AUTO: 36.6 % (ref 35–45)
HGB BLD-MCNC: 12 G/DL (ref 12–16)

## 2017-08-18 PROCEDURE — 85018 HEMOGLOBIN: CPT | Performed by: OBSTETRICS & GYNECOLOGY

## 2017-08-18 PROCEDURE — 74011250636 HC RX REV CODE- 250/636: Performed by: OBSTETRICS & GYNECOLOGY

## 2017-08-18 PROCEDURE — 85461 HEMOGLOBIN FETAL: CPT | Performed by: OBSTETRICS & GYNECOLOGY

## 2017-08-18 PROCEDURE — 74011250636 HC RX REV CODE- 250/636: Performed by: NURSE ANESTHETIST, CERTIFIED REGISTERED

## 2017-08-18 PROCEDURE — 86900 BLOOD TYPING SEROLOGIC ABO: CPT | Performed by: OBSTETRICS & GYNECOLOGY

## 2017-08-18 PROCEDURE — 65270000029 HC RM PRIVATE

## 2017-08-18 PROCEDURE — 85014 HEMATOCRIT: CPT | Performed by: OBSTETRICS & GYNECOLOGY

## 2017-08-18 PROCEDURE — 74011250637 HC RX REV CODE- 250/637: Performed by: OBSTETRICS & GYNECOLOGY

## 2017-08-18 PROCEDURE — 36415 COLL VENOUS BLD VENIPUNCTURE: CPT | Performed by: OBSTETRICS & GYNECOLOGY

## 2017-08-18 PROCEDURE — 3E0334Z INTRODUCTION OF SERUM, TOXOID AND VACCINE INTO PERIPHERAL VEIN, PERCUTANEOUS APPROACH: ICD-10-PCS | Performed by: OBSTETRICS & GYNECOLOGY

## 2017-08-18 RX ADMIN — OXYCODONE HYDROCHLORIDE AND ACETAMINOPHEN 2 TABLET: 5; 325 TABLET ORAL at 09:18

## 2017-08-18 RX ADMIN — OXYCODONE HYDROCHLORIDE AND ACETAMINOPHEN 2 TABLET: 5; 325 TABLET ORAL at 05:08

## 2017-08-18 RX ADMIN — OXYCODONE HYDROCHLORIDE AND ACETAMINOPHEN 2 TABLET: 5; 325 TABLET ORAL at 13:21

## 2017-08-18 RX ADMIN — OXYCODONE HYDROCHLORIDE AND ACETAMINOPHEN 2 TABLET: 5; 325 TABLET ORAL at 22:04

## 2017-08-18 RX ADMIN — OXYCODONE HYDROCHLORIDE AND ACETAMINOPHEN 2 TABLET: 5; 325 TABLET ORAL at 17:26

## 2017-08-18 RX ADMIN — HYDROMORPHONE HYDROCHLORIDE 0.5 MG: 1 INJECTION, SOLUTION INTRAMUSCULAR; INTRAVENOUS; SUBCUTANEOUS at 00:28

## 2017-08-18 RX ADMIN — HUMAN RHO(D) IMMUNE GLOBULIN 0.3 MG: 300 INJECTION, SOLUTION INTRAMUSCULAR at 11:55

## 2017-08-18 RX ADMIN — DIPHENHYDRAMINE HYDROCHLORIDE 25 MG: 50 INJECTION, SOLUTION INTRAMUSCULAR; INTRAVENOUS at 02:10

## 2017-08-18 NOTE — ROUTINE PROCESS
Verbal shift change report given to Mayra Nails RN (oncoming nurse) by Ce Rosen RN (offgoing nurse). Report included the following information SBAR, Kardex and MAR.

## 2017-08-18 NOTE — PROGRESS NOTES
visited new mother. With her permission,  announced arrival of her .      [facility] Certified [facility], [address] [place] [address]    [phone]

## 2017-08-18 NOTE — PROGRESS NOTES
Baby announcement.     [address] [facility]   Staff [address] [facility]   [phone]

## 2017-08-18 NOTE — ROUTINE PROCESS
0700--Bedside and Verbal shift change report given to Keny Jacobo RN (oncoming nurse) by David Swift RN (offgoing nurse). Report included the following information SBAR, Kardex, Intake/Output, MAR and Recent Results. 0805--Assessment completed. Vitals stable. Educated patient on normal bleeding and when to call nurse for assistance. Fundus firm, lochia small. 1505--Assessment completed. Vitals stable. 1844--Mother doing well. Up without complaints. Voiding without problems. Pain managed well with percocet. Bonding well with baby.

## 2017-08-18 NOTE — PROGRESS NOTES
Post-Operative Day Number 1 Progress Note    Patient doing well post-op day 1 from  delivery without significant complaints. Pain controlled on current medication. Voiding without difficulty, normal lochia.  +flatus, no BM. Denies CP/SOB/fevers/chills/nausea. Vitals:  Patient Vitals for the past 8 hrs:   BP Temp Pulse Resp SpO2   17 0341 121/65 100.1 °F (37.8 °C) 99 17 100 %     Temp (24hrs), Av.5 °F (36.9 °C), Min:97.6 °F (36.4 °C), Max:100.1 °F (37.8 °C)      Vital signs stable, afebrile. Exam:  Patient without distress. Abdomen soft, fundus firm at level of umbilicus, non tender. Incision dry and clean without erythema. Lower extremities are negative for swelling, cords or tenderness. Lab/Data Review:  CBC:   Lab Results   Component Value Date/Time    HGB 12.0 2017 05:55 AM    HCT 36.6 2017 05:55 AM       Assessment and Plan:  Patient appears to be having uncomplicated post- course. Continue routine post-op care and maternal education.

## 2017-08-19 VITALS
DIASTOLIC BLOOD PRESSURE: 66 MMHG | TEMPERATURE: 98.1 F | OXYGEN SATURATION: 99 % | SYSTOLIC BLOOD PRESSURE: 124 MMHG | RESPIRATION RATE: 18 BRPM | HEIGHT: 64 IN | BODY MASS INDEX: 39.27 KG/M2 | HEART RATE: 90 BPM | WEIGHT: 230 LBS

## 2017-08-19 LAB
ABO + RH BLD: NORMAL
ABO + RH BLDCO: NORMAL
BLD PROD TYP BPU: NORMAL
BPU ID: NORMAL
CALLED TO:,BCALL1: NORMAL
FETAL SCREEN,FMHS: NORMAL
STATUS OF UNIT,%ST: NORMAL
UNIT DIVISION, %UDIV: 0

## 2017-08-19 PROCEDURE — 74011250637 HC RX REV CODE- 250/637: Performed by: OBSTETRICS & GYNECOLOGY

## 2017-08-19 RX ORDER — AMOXICILLIN 250 MG
1 CAPSULE ORAL DAILY
Qty: 60 TAB | Refills: 0 | Status: SHIPPED | OUTPATIENT
Start: 2017-08-19 | End: 2019-02-08

## 2017-08-19 RX ORDER — IBUPROFEN 800 MG/1
800 TABLET ORAL
Qty: 30 TAB | Refills: 2 | Status: SHIPPED | OUTPATIENT
Start: 2017-08-19

## 2017-08-19 RX ORDER — OXYCODONE AND ACETAMINOPHEN 5; 325 MG/1; MG/1
1-2 TABLET ORAL
Qty: 30 TAB | Refills: 0 | Status: SHIPPED | OUTPATIENT
Start: 2017-08-19 | End: 2019-02-08

## 2017-08-19 RX ADMIN — OXYCODONE HYDROCHLORIDE AND ACETAMINOPHEN 2 TABLET: 5; 325 TABLET ORAL at 10:34

## 2017-08-19 RX ADMIN — OXYCODONE HYDROCHLORIDE AND ACETAMINOPHEN 2 TABLET: 5; 325 TABLET ORAL at 14:47

## 2017-08-19 RX ADMIN — OXYCODONE HYDROCHLORIDE AND ACETAMINOPHEN 2 TABLET: 5; 325 TABLET ORAL at 01:46

## 2017-08-19 RX ADMIN — OXYCODONE HYDROCHLORIDE AND ACETAMINOPHEN 2 TABLET: 5; 325 TABLET ORAL at 06:27

## 2017-08-19 NOTE — ROUTINE PROCESS
Verbal shift change report given to Price Swain RN (oncoming nurse) by Sahil Mcdaniel RN (offgoing nurse). Report included the following information SBAR, Kardex and MAR.

## 2017-08-19 NOTE — PROGRESS NOTES
Problem: Falls - Risk of  Goal: *Absence of Falls  Document Jeff Fall Risk and appropriate interventions in the flowsheet.    Outcome: Progressing Towards Goal  Fall Risk Interventions:              Medication Interventions: Patient to call before getting OOB

## 2017-08-19 NOTE — ROUTINE PROCESS
Bedside and Verbal shift change report given to Khadar [address]mer [address] (oncoming nurse) by Suyapa Romo RN (offgoing nurse). Report included the following information SBAR, OR Summary, Procedure Summary, Intake/Output, MAR and Recent Results.

## 2017-08-19 NOTE — ROUTINE PROCESS
Assisted patient and FOB with swaddling and reminded to keep the extra blanket (heavy baby blanket brought from home) out of the bassinet. Patient ambulating without any complaints, voiding without problems. Pain controlled with Percocet. Bonding well with infant. Vital signs are stable. Incision looks well.

## 2017-08-19 NOTE — PROGRESS NOTES
~~ 0750 ASSUME CARE OF PT UP IN ROOM TENDING TO BABY- EXTRA DIAPERS & WIPES PROVIDED PER REQ- PT DENIES NEED FOR PAIN MED, INCREASED BLEEDING, DIFF VOIDING, NAUSEA OR ITCHING- PT HAS FULL WATER MUG- FOB SLEEPING- PT ASKING ABOUT LEAVING TODAY- TOLD TO DISCUSS W/ PROVIDER    ~~ 4108 REG DIET BREAKFAST TRAY SERVED.    ~~0845 BABY TO [facility] FOR PES CHECK. ASSESSMENT AS CARTED. DR Felicia Lamas HERE TO DO CIRC- JUST CONSENTED PT-    ~~0945 PT SHOWERING, PREPARING TO GO HOME--     ~~1034 PERCOCET GIVEN TO PT BY EMI POP-    ~~1040 BABY RETURNED TO THE ROOM & CIRC TEACHING DONE BY EMI RN    ~~1130 PAIN MED EFFECTIVE. PT GIVEN SAMPLE BAG, Rx X1 & WRITTEN D/C INSTRUCTIONS FOR HER TO READ-    ~~1200 REG DIET LUNCH TRAY SERVED    ~~1330 PT RESTING, WAITING FOR PEDS TO D/C BABY    ~~ 1415 PT GIVEN WRITTEN D/C INSTRUCTIONS FR BABY. HUGS TAG REMOVED. BRACELETS CHECKED & FOOT PRINT SHEET SIGNED. D/C TEACHING DONE- PT RECEPTIVE.     ~~1450 PREPARING TO LEAVE, BABY INTO CAR SEAT . DR MARTINEZ IN TO DELIVER ENVELOPE FOR BABY;S FOLLOW UP WHEN SHE DISCOVERS THAT THE CAR SEAT WAS MADE IN [place] & DOES NT MEET US SAFETY STANDARDS.  DR MARTINEZ SPENDS TIME REVIEWING CAR SEAT SAFETY & THE DEFICITS THIS CAR SEAT HAS-- PT GIVEN CHOICE TO GO BUT NEW SEAT NOW OR GO HOME, ACCEPTING RISKS, & GET NEW SEAT ASAP- PT & FOB WANT TO GO HOME NOW AND GET NEW CAR SEAT LATER--  DR MARTINEZ TO WRITE UP ACKNOWLEDGEMENT FOR PARENTS TO SIGN    ~~1500 BABY INTO CAR SEAT- PAPER SIGNED BY PARENTS ACCEPTING RESPONSIBILITY--  PT D/C'D HOME VIA W/C IN [place]

## 2017-08-19 NOTE — PROGRESS NOTES
Progress Note    Patient: Viviane Palm MRN: [id number]  SSN: [id number]    YOB: 1995  Age: 24 y.o. Sex: female      Subjective:     Postpartum Day: 2     Delivery:  delivery    Pt denies complaints. Tolerating diet. Passing flatus. Pain controlled. No CP/SOB/dizziness. Requesting  circumcision. Objective:    Patient Vitals for the past 24 hrs:   Temp Pulse Resp BP SpO2   17 2306 98.7 °F (37.1 °C) 100 18 117/61 99 %   17 1505 98.5 °F (36.9 °C) 90 18 120/66 100 %   17 0805 98.3 °F (36.8 °C) 90 16 114/61 99 %       Gen: NAD   CV: RRR   CHEST: Normal effort without use of accessory muscles. CTAB   Abdomen: Normoactive BS, soft, NT   Uterine Fundus:   firm at umbilicus, nontender. Incision:  Clean/Dry/Intact   Extremities: No LE edema     Lab/Data Review: All lab results for the last 24 hours reviewed. Assessment:     Doing well postpartum  delivery    circumcision R/B/A discussed. Consent signed. Plan:     Postpartum care discussed including diet, ambulation, and actvitiy restrictions. Discharge instructions and questions answered for  delivery.     Signed By: Sigifredo Barreto DO     2017

## 2017-08-19 NOTE — DISCHARGE SUMMARY
Obstetrical Discharge Summary     Name: Chacorta Hoff MRN: [id number]  SSN: [id number]    YOB: 1995  Age: 24 y.o. Sex: female      Admit Date: 8/15/2017    Discharge Date: 2017     Admitting Physician: Ann-Marie Hernandez DO     Attending Physician:  Lambert Field DO     * Admission Diagnoses: IUGR (intrauterine growth restriction) affecting care of mo*    * Discharge Diagnoses:   Information for the patient's :  Dg, Baby Boy Jennifer Dickson [[id number]]   Delivery of a 5 lb 9.2 oz (2.53 kg) male infant via , Low Transverse on 2017 at 8:23 AM  by . Apgars were 8 and 9. Additional Diagnoses:   Hospital Problems as of 2017  Date Reviewed: 2017          Codes Class Noted - Resolved POA    37 weeks gestation of pregnancy ICD-10-CM: Z3A.37  ICD-9-CM: V22.2  2017 - Present Unknown        Group B streptococcal carriage complicating pregnancy F-93-HX: O99.820  ICD-9-CM: 648.93, V02.51  2017 - Present Yes        IUGR (intrauterine growth restriction) affecting care of mother ICD-10-CM: O36.5990  ICD-9-CM: 656.50  2017 - Present Unknown        [facility] ICD-10-CM: O99.119, D69.6  ICD-9-CM: 649.30, 287.5  2017 - Present Yes        Rh negative state in antepartum period ICD-10-CM: O09.899  ICD-9-CM: V23.89  2017 - Present Yes             Lab Results   Component Value Date/Time    ABO/Rh(D) O NEGATIVE 2017 05:55 AM    Rubella, External IMMUNE 2017    GrBStrep, External POSITIVE 2017      Immunization History   Administered Date(s) Administered    Rho(D) Immune Globulin - IM 2017, 2017    Tdap 2017       * Procedures:   Procedure(s):   SECTION           * Discharge Condition: stable    * Hospital Course: Normal hospital course following the delivery.     * Disposition: Home    Discharge Medications:   Current Discharge Medication List      START taking these medications Details   ibuprofen (MOTRIN) 800 mg tablet Take 1 Tab by mouth every eight (8) hours as needed. Qty: 30 Tab, Refills: 2      oxyCODONE-acetaminophen (PERCOCET) 5-325 mg per tablet Take 1-2 Tabs by mouth every four (4) hours as needed. Max Daily Amount: 12 Tabs. Qty: 30 Tab, Refills: 0      senna-docusate (PERICOLACE) 8.6-50 mg per tablet Take 1 Tab by mouth daily. Qty: 60 Tab, Refills: 0         CONTINUE these medications which have NOT CHANGED    Details   pnv w/o calcium-iron fum-fa 27-1 mg tab Take  by mouth. * Follow-up Care/Patient Instructions:     Follow-up Information     Follow up With Details Comments [address] [facility] DO [place] Schedule an appointment as soon as possible for a visit in 2 weeks For wound re-check [address]  [address] [place] [address]  [phone]             Signed By:  Minda Singletary DO     August 19, 2017

## 2017-08-19 NOTE — DISCHARGE INSTRUCTIONS
[facility]  [address] [address]      PROCEDURE:  Section. [facility] if any of the following occur:       You are unable to urinate. Urgency to urinate is not uncommon.  Excessive vaginal bleeding > 1 maxi pad an hour for more then 2 hours straight.  Temperature above 101.0° and / or chills.  You are nauseous and / or vomiting and you cannot hold down any fluids.  Your pain is not controlled with the pain medication prescribed. Special Considerations:      If you had a  section delivery do not drive for at least 72 hours after the procedure and until you are no longer taking narcotic pain medication and you are able to move and react without hesitation.  For the first two weeks you should rest and take care of yourself and your baby!  You may return to work in 6 weeks. [x] Pelvic rest (nothing in the vagina) for 6 weeks. [x] No heavy lifting over 10 pounds & no strenuous exercise for 6 weeks. [] Other instructions:             Please continue any of your other regular home medications including your vitamins and other supplements. .  If you have not already scheduled your post partum appointment please do so with our office staff. Your post partum appointment should be in 2 weeks. Please contact [facility] or go to the nearest Emergency Department / Urgent Care facility for any other medical questions or concerns and to schedule your post partum visit.  Section: What to Expect at [address] [place] [address]   A  section, or , is surgery to deliver your baby through a cut, called an incision, that the doctor makes in your lower belly and uterus. You may have some pain in your lower belly and need pain medicine for 1 to 2 weeks. You can expect some vaginal bleeding for several weeks.  You will probably need about 6 weeks to fully recover. It is important to take it easy while the incision is healing. Avoid heavy lifting, strenuous activities, or exercises that strain the belly muscles while you are recovering. Ask a family member or friend for help with housework, cooking, and shopping. This care sheet gives you a general idea about how long it will take for you to recover. But each person recovers at a different pace. Follow the steps below to get better as quickly as possible. How can you care for yourself at home? Activity   Rest when you feel tired. Getting enough sleep will help you recover. Try to walk each day. Start by walking a little more than you did the day before. Bit by bit, increase the amount you walk. Walking boosts blood flow and helps prevent pneumonia, constipation, and blood clots. Avoid strenuous activities, such as bicycle riding, jogging, weightlifting, and aerobic exercise, for 6 weeks or until your doctor says it is okay. Until your doctor says it is okay, do not lift anything heavier than your baby. Do not do sit-ups or other exercises that strain the belly muscles for 6 weeks or until your doctor says it is okay. Hold a pillow over your incision when you cough or take deep breaths. This will support your belly and decrease your pain. You may shower as usual. Pat the incision dry when you are done. You will have some vaginal bleeding. Wear sanitary pads. Do not douche or use tampons until your doctor says it is okay. Ask your doctor when you can drive again. You will probably need to take at least 6 weeks off work. It depends on the type of work you do and how you feel. Ask your doctor when it is okay for you to have sex. Diet   You can eat your normal diet. If your stomach is upset, try bland, low-fat foods like plain rice, broiled chicken, toast, and yogurt. Drink plenty of fluids (unless your doctor tells you not to).    You may notice that your bowel movements are not regular right after your surgery. This is common. Try to avoid constipation and straining with bowel movements. You may want to take a fiber supplement every day. If you have not had a bowel movement after a couple of days, ask your doctor about taking a mild laxative. If you are breast-feeding, do not drink any alcohol. Medicines   Take pain medicines exactly as directed. If the doctor gave you a prescription medicine for pain, take it as prescribed. If you are not taking a prescription pain medicine, ask your doctor if you can take an over-the-counter medicine. If you think your pain medicine is making you sick to your stomach: Take your medicine after meals (unless your doctor has told you not to). Ask your doctor for a different pain medicine. If your doctor prescribed antibiotics, take them as directed. Do not stop taking them just because you feel better. You need to take the full course of antibiotics. Incision care   If you have strips of tape on the incision, leave the tape on for a week or until it falls off. Wash the area daily with warm, soapy water, and pat it dry. Don't use hydrogen peroxide or alcohol, which can slow healing. You may cover the area with a gauze bandage if it weeps or rubs against clothing. Change the bandage every day. Keep the area clean and dry. Other instructions   If you breast-feed your baby, you may be more comfortable while you are healing if you place the baby so that he or she is not resting on your belly. Try tucking your baby under your arm, with his or her body along the side you will be feeding on. Support your baby's upper body with your arm. With that hand you can control your baby's head to bring his or her mouth to your breast. This is sometimes called the football hold. Follow-up care is a key part of your treatment and safety. Be sure to make and go to all appointments, and call your doctor if you are having problems.  Its also a good idea to know your test results and keep a list of the medicines you take. When should you call for help? Call 911 anytime you think you may need emergency care. For example, call if:   You passed out (lost consciousness). You have severe trouble breathing. You have sudden chest pain and shortness of breath, or you cough up blood. You have severe pain in your belly. Call your doctor now or seek immediate medical care if:   You have bright red vaginal bleeding that soaks one or more pads each hour for 2 or more hours. Your vaginal bleeding seems to be getting heavier or is still bright red 4 days after delivery. You pass blood clots larger than the size of a golf ball. You have vaginal discharge that smells bad. You are sick to your stomach or cannot keep fluids down. You have pain that does not get better after you take pain medicine. You have loose stitches, or your incision comes open. Your belly feels tender, or full and hard. You have signs of infection, such as: Increased pain, swelling, warmth, or redness. Red streaks leading from the incision. Pus draining from the incision. Swollen lymph nodes in your neck, armpits, or groin. A fever. You have signs of a blood clot, such as:   Pain in your calf, back of the knee, thigh, or groin. Redness and swelling in your leg or groin. You have trouble passing urine or stool, especially if you have pain or swelling in your lower belly. You feel sad, tearful, or hopeless for more than a few days, or you have troubling or dangerous thoughts. Watch closely for changes in your health, and be sure to contact your doctor if:   You do not get better as expected. Where can you learn more? Go to [employer]   Enter M806 in the search box to learn more about \" Section: What to Expect at Home. \"    © 8979-8248 Healthwise, [employer].  Care instructions adapted under license by [employer] (which disclaims liability or warranty for this information). This care instruction is for use with your licensed healthcare professional. If you have questions about a medical condition or this instruction, always ask your healthcare professional. [address] any warranty or liability for your use of this information.    Content Version: 19.4.760818; Current as of: June 4, 2014

## 2017-08-22 NOTE — ANESTHESIA POSTPROCEDURE EVALUATION
Post-Anesthesia Evaluation & Assessment    Visit Vitals    /66 (BP 1 Location: Left arm, BP Patient Position: At rest)    Pulse 90    Temp 36.7 °C (98.1 °F)    Resp 18    Ht 5' 4\" (1.626 m)    Wt 104.3 kg (230 lb)    SpO2 99%    Breastfeeding Unknown    BMI 39.48 kg/m2     Per OB/GYN discharge note  Patient vital signs stable. No nausea and vomiting. Tolerating regular diet. Pain under control. Complications related to anesthesia: none    Patient has met all discharge requirements.     Additional comments:        Serge Yadav CRNA

## 2017-08-31 ENCOUNTER — HOSPITAL ENCOUNTER (OUTPATIENT)
Dept: LAB | Age: 22
Discharge: HOME | End: 2017-08-31

## 2017-08-31 ENCOUNTER — ROUTINE PRENATAL (OUTPATIENT)
Dept: OBGYN CLINIC | Age: 22
End: 2017-08-31

## 2017-08-31 VITALS
HEIGHT: 64 IN | SYSTOLIC BLOOD PRESSURE: 132 MMHG | DIASTOLIC BLOOD PRESSURE: 92 MMHG | RESPIRATION RATE: 18 BRPM | BODY MASS INDEX: 35.68 KG/M2 | HEART RATE: 85 BPM | WEIGHT: 209 LBS

## 2017-08-31 DIAGNOSIS — D69.6 THROMBOCYTOPENIA AFFECTING PREGNANCY (HCC): ICD-10-CM

## 2017-08-31 DIAGNOSIS — O99.119 THROMBOCYTOPENIA AFFECTING PREGNANCY (HCC): ICD-10-CM

## 2017-08-31 PROCEDURE — 99001 SPECIMEN HANDLING PT-LAB: CPT | Performed by: OBSTETRICS & GYNECOLOGY

## 2017-08-31 NOTE — PROGRESS NOTES
Patient is now 2 weeks ost-op from a primary C section for fetal intolerance of labor. She has no complaints. She denies pain and says that her bleeding is very light. She is breast feeding. Exam: incision C/D/I without erythema, induration, or drainage  Normal post-op incision check. Thrombocytopenia during pregnancy, will recheck platelet count today. Contraceptive options dicussed, patient will contact us with her decision. Follow up 4 weeks for full postpartum exam.  Plan of care discussed. Patient expressed understanding.

## 2017-09-01 LAB — PLATELET # BLD AUTO: 243 X10E3/UL (ref 150–379)

## 2017-09-28 ENCOUNTER — ROUTINE PRENATAL (OUTPATIENT)
Dept: OBGYN CLINIC | Age: 22
End: 2017-09-28

## 2017-09-28 VITALS
WEIGHT: 209 LBS | HEIGHT: 64 IN | BODY MASS INDEX: 35.68 KG/M2 | HEART RATE: 74 BPM | DIASTOLIC BLOOD PRESSURE: 76 MMHG | SYSTOLIC BLOOD PRESSURE: 118 MMHG | RESPIRATION RATE: 18 BRPM

## 2017-09-28 DIAGNOSIS — Z30.42 FAMILY PLANNING, DEPO-PROVERA CONTRACEPTION MONITORING/ADMINISTRATION: ICD-10-CM

## 2017-09-28 LAB
HCG URINE, QL. (POC): NEGATIVE
VALID INTERNAL CONTROL?: YES

## 2017-09-28 RX ORDER — MEDROXYPROGESTERONE ACETATE 150 MG/ML
150 INJECTION, SUSPENSION INTRAMUSCULAR ONCE
Qty: 1 ML | Refills: 3 | Status: SHIPPED | OUTPATIENT
Start: 2017-09-28 | End: 2017-09-28

## 2017-09-28 NOTE — PROGRESS NOTES
Subjective:   Ms. Frankie Parham is a 25 y.o. who is now 6 weeks postpartum. OB History      Para Term  AB Living    2 1 1 0 1 1    SAB TAB Ectopic Molar Multiple Live Births    1 0 0  0 1        Method of delivery: primary  section  She is breast-feeding and is not experiencing problems. Pregnancy complications: thrombocytopenia. She is feeling well and happy. She currently uses abstinence for contraception. She plans to use Depo-Provera for contraception. Current Outpatient Prescriptions   Medication Sig Dispense Refill    medroxyPROGESTERone (DEPO-PROVERA) 150 mg/mL injection 1 mL by IntraMUSCular route once for 1 dose. 1 mL 3    ibuprofen (MOTRIN) 800 mg tablet Take 1 Tab by mouth every eight (8) hours as needed. 30 Tab 2    oxyCODONE-acetaminophen (PERCOCET) 5-325 mg per tablet Take 1-2 Tabs by mouth every four (4) hours as needed. Max Daily Amount: 12 Tabs. 30 Tab 0    senna-docusate (PERICOLACE) 8.6-50 mg per tablet Take 1 Tab by mouth daily. 60 Tab 0    pnv w/o calcium-iron fum-fa 27-1 mg tab Take  by mouth.        Allergies   Allergen Reactions    Shrimp Itching    Tramadol Itching     Past Medical History:   Diagnosis Date    Anxiety     Asthma     Depression     anxiety     Past Surgical History:   Procedure Laterality Date    HX HERNIA REPAIR  childhood     Family History   Problem Relation Age of Onset    No Known Problems Mother     Diabetes Father     No Known Problems Maternal Grandmother     No Known Problems Maternal Grandfather     No Known Problems Paternal Grandmother     No Known Problems Paternal Grandfather      Social History   Substance Use Topics    Smoking status: Never Smoker    Smokeless tobacco: Never Used    Alcohol use No        Objective:   Physical Exam:  Date of last Pap smear: 17  Physical Exam: GENERAL APPEARANCE: alert, well appearing, in no apparent distress  NEUROLOGIC: alert, oriented, normal speech, no focal findings or movement disorder noted  EXTERNAL GENITALIA POSTPARTUM: normal, well-healed, without lesions or masses  VAGINA POSTPARTUM: normal, well-healed, physiologic discharge, without lesions  CERVIX POSTPARTUM: normal, well-healed, without lesions  UTERUS POSTPARTUM: normal size, well involuted, firm, non-tender  ADNEXA POSTPARTUM: no masses palpable and nontender    Assessment/Plan:   normal postpartum exam  patient is a candidate for Depo-Provera for contraception, with no contraindications  able to resume normal activities

## 2017-09-28 NOTE — PROGRESS NOTES
Pt tolerated depo IM injection to left deltoid without any distress noted. UPT negative and she aware of next injection is due in 12/2017.

## 2017-09-28 NOTE — MR AVS SNAPSHOT
Visit Information Date & Time Provider Department Dept. Phone Encounter #  
 9/28/2017 10:30 AM Tova Quezada, [address] [facility] [phone] [id number] Follow-up Instructions Return in about 1 year (around 9/28/2018). Upcoming Health Maintenance Date Due  
 HPV AGE 9Y-34Y (1 of 3 - Female 3 Dose Series) 9/22/2006 INFLUENZA AGE 9 TO ADULT 8/1/2017 PAP AKA CERVICAL CYTOLOGY 2/9/2020 Allergies as of 9/28/2017  Review Complete On: 9/28/2017 By: Tova Quezada, DO Severity Noted Reaction Type Reactions Shrimp  08/17/2017   Systemic Itching Tramadol  09/19/2016    Itching Current Immunizations  Reviewed on 8/17/2017 Name Date Rho(D) Immune Globulin - IM 8/18/2017 11:55 AM, 2/18/2017 12:36 PM  
 Tdap 6/6/2017 Not reviewed this visit You Were Diagnosed With   
  
 Codes Comments Routine postpartum follow-up    -  Primary ICD-10-CM: Z39.2 ICD-9-CM: V24.2 Family planning, Depo-Provera contraception monitoring/administration     ICD-10-CM: Z30.42 
ICD-9-CM: V25.49 Vitals BP Pulse Resp Height(growth percentile) Weight(growth percentile) LMP  
 118/76 74 18 5' 4\" (1.626 m) 209 lb (94.8 kg) 11/23/2016 Breastfeeding? BMI OB Status Smoking Status Yes 35.87 kg/m2 Recent pregnancy Never Smoker BMI and BSA Data Body Mass Index Body Surface Area  
 35.87 kg/m 2 2.07 m 2 Preferred Pharmacy Pharmacy Name Phone [facility] - [address], [address] [phone] Your Updated Medication List  
  
   
This list is accurate as of: 9/28/17 12:27 PM.  Always use your most recent med list.  
  
  
  
  
 ibuprofen 800 mg tablet Commonly known as:  MOTRIN Take 1 Tab by mouth every eight (8) hours as needed. medroxyPROGESTERone 150 mg/mL injection Commonly known as:  DEPO-PROVERA  
1 mL by IntraMUSCular route once for 1 dose. oxyCODONE-acetaminophen 5-325 mg per tablet Commonly known as:  PERCOCET Take 1-2 Tabs by mouth every four (4) hours as needed. Max Daily Amount: 12 Tabs. pnv w/o calcium-iron fum-fa 27-1 mg Tab Take  by mouth. senna-docusate 8.6-50 mg per tablet Commonly known as:  Jacquenette Devoid Take 1 Tab by mouth daily. Prescriptions Sent to Pharmacy Refills  
 medroxyPROGESTERone (DEPO-PROVERA) 150 mg/mL injection 3 Si mL by IntraMUSCular route once for 1 dose. Class: Normal  
 Pharmacy: [id number], [address] Ph #: [phone] Route: IntraMUSCular We Performed the Following AMB POC URINE PREGNANCY TEST, VISUAL COLOR COMPARISON [47336 CPT(R)] VA MEDROXYPROGESTERONE ACETATE, 1MG [ [facility]] VA THER/PROPH/DIAG INJECTION, SUBCUT/IM [id number] CPT(R)] Follow-up Instructions Return in about 1 year (around 2018). Introducing [facility]! Dear Velvet Inch: Thank you for requesting a zerved account. Our records indicate that you already have an active zerved account. You can access your account anytime at [email or web address] Did you know that you can access your hospital and ER discharge instructions at any time in zerved? You can also review all of your test results from your hospital stay or ER visit. Additional Information If you have questions, please visit the Frequently Asked Questions section of the zerved website at [email or web address] Remember, zerved is NOT to be used for urgent needs. For medical emergencies, dial 911. Now available from your iPhone and Android! Please provide this summary of care documentation to your next provider. Your primary care clinician is listed as NONE. If you have any questions after today's visit, please call [phone].

## 2019-02-08 ENCOUNTER — HOSPITAL ENCOUNTER (EMERGENCY)
Age: 24
Discharge: HOME | End: 2019-02-08
Attending: EMERGENCY MEDICINE
Payer: MEDICAID

## 2019-02-08 VITALS
DIASTOLIC BLOOD PRESSURE: 89 MMHG | RESPIRATION RATE: 16 BRPM | WEIGHT: 190 LBS | HEART RATE: 95 BPM | HEIGHT: 64 IN | TEMPERATURE: 98.1 F | OXYGEN SATURATION: 100 % | SYSTOLIC BLOOD PRESSURE: 155 MMHG | BODY MASS INDEX: 32.44 KG/M2

## 2019-02-08 DIAGNOSIS — K02.9 DENTAL CARIES: Primary | ICD-10-CM

## 2019-02-08 DIAGNOSIS — K08.89 PAIN, DENTAL: ICD-10-CM

## 2019-02-08 PROCEDURE — 99282 EMERGENCY DEPT VISIT SF MDM: CPT

## 2019-02-08 RX ORDER — ACETAMINOPHEN AND CODEINE PHOSPHATE 300; 30 MG/1; MG/1
1 TABLET ORAL
Qty: 12 TAB | Refills: 0 | Status: SHIPPED | OUTPATIENT
Start: 2019-02-08

## 2019-02-08 NOTE — DISCHARGE INSTRUCTIONS
Dental Pain: After Your Visit  Your Care Instructions  The most common cause of dental pain is tooth decay. It can also be caused by an infection of the tooth (abscess) or gum, a tooth that has not broken all the way through the gum (impacted tooth), or a problem with the nerve-filled center of the tooth. Follow-up care is a key part of your treatment and safety. Be sure to make and go to all appointments, and call your doctor if you are having problems. Its also a good idea to know your test results and keep a list of the medicines you take. How can you care for yourself at home? · Contact a dentist for follow-up care. · Put ice or a cold pack on the outside of your mouth for 10 to 20 minutes at a time to reduce pain and swelling. Put a thin cloth between the ice and your skin. · Take an over-the-counter pain medicine, such as acetaminophen (Tylenol), ibuprofen (Advil, Motrin), or naproxen (Aleve). Read and follow all instructions on the label. · Do not take two or more pain medicines at the same time unless the doctor told you to. Many pain medicines have acetaminophen, which is Tylenol. Too much acetaminophen (Tylenol) can be harmful. · Rinse your mouth with warm salt water every 2 hours to help relieve pain and swelling from an infected tooth. Mix 1 teaspoon of salt in 8 ounces of water. · If your doctor prescribed antibiotics, take them as directed. Do not stop taking them just because you feel better. You need to take the full course of antibiotics. When should you call for help? Call your doctor now or seek immediate medical care if:  · You have signs of infection, such as:  ¨ Increased pain, swelling, warmth, or redness. ¨ Pus draining from the gum, tooth, or face. ¨ A fever. Watch closely for changes in your health, and be sure to contact your doctor if:  · You do not get better as expected. Where can you learn more?    Go to Woqu.com.be  Enter J164 in the search box to learn more about \"Dental Pain: After Your Visit. \"   © 6388-7855 [employer]. Care instructions adapted under license by [facility] (which disclaims liability or warranty for this information). This care instruction is for use with your licensed healthcare professional. If you have questions about a medical condition or this instruction, always ask your healthcare professional. [place] [address] any warranty or liability for your use of this information. Content Version: 67.5.978379; Last Revised: May 17, 2013               Dr. Gómez Blain, [facility] [address] [facility] [address]  [address]:  [address] [facility], [address] [place] [address]  [phone]  [place], and Extractions    [facility]  [address] [facility]  Cloteal Hylan, [address] [facility] [place]  [phone]  [place], and Extractions    [facility]  [address] [facility] [address]  [phone]  Fillings, Cleaning, and [address] [facility] [place]  [address] [place], [facility] [facility]  [phone]    [facility]  [address] [place] [address], [address]  [phone]  Ages 3-18, if attending [address], [address]  [phone]  Extractions, Piter Linder, [facility]    [facility]  [address], [address]  [phone]  Oral Surgery - $70 required  Dennie Dinesher, Extractions - $100 Required    [address]   [address], [address]  [phone]  [facility] Only    [address], [address] [facility]  Dental Clinic Open September to June

## 2019-02-08 NOTE — ED PROVIDER NOTES
EMERGENCY DEPARTMENT HISTORY AND PHYSICAL EXAM 
 
Date: 2/8/2019 Patient Name: Nishant Nunn History of Presenting Illness Chief Complaint Patient presents with  Dental Pain History Provided By: Patient Chief Complaint: worsening right, upper tooth pain Duration: 3-4 months ago Timing:  Worsening Location: right, upper tooth, facial, and neck Modifying Factors: talking worsens sx Associated Symptoms: facial and neck pain Additional History (Context):  
2:58 PM  
Nishant Nunn is a 21 y.o. female who presents to the emergency department C/O worsening right, upper tooth pain onset 3-4 months ago. Associated sxs include facial and neck pain. Talking worsens sx. Pt saw dentist today and was given a numbing injection. Pt said she has low platelets and dentist cannot perform surgery due to that. Pt has been taking Clindamycin, which she started today and Ibuprofen. Pt does not currently see a hematologist. Pt has no denies, and any other sxs or complaints. PCP: None Current Outpatient Medications Medication Sig Dispense Refill  acetaminophen-codeine (TYLENOL-CODEINE #3) 300-30 mg per tablet Take 1 Tab by mouth every four (4) hours as needed for Pain. Max Daily Amount: 6 Tabs. 12 Tab 0  ibuprofen (MOTRIN) 800 mg tablet Take 1 Tab by mouth every eight (8) hours as needed. 30 Tab 2  pnv w/o calcium-iron fum-fa 27-1 mg tab Take  by mouth. Past History Past Medical History: 
Past Medical History:  
Diagnosis Date  Anxiety  Asthma  Depression   
 anxiety Past Surgical History: 
Past Surgical History:  
Procedure Laterality Date  HX HERNIA REPAIR  childhood Family History: 
Family History Problem Relation Age of Onset  No Known Problems Mother  Diabetes Father  No Known Problems Maternal Grandmother  No Known Problems Maternal Grandfather  No Known Problems Paternal Grandmother  No Known Problems Paternal Grandfather Social History: 
Social History Tobacco Use  Smoking status: Never Smoker  Smokeless tobacco: Never Used Substance Use Topics  Alcohol use: No  
 Drug use: No  
 
 
Allergies: Allergies Allergen Reactions  Shrimp Itching  Tramadol Itching Review of Systems Review of Systems HENT:  
     (+) worsening right, upper tooth pain 
(+) facial pain Musculoskeletal: Positive for neck pain. All other systems reviewed and are negative. Physical Exam  
 
Vitals:  
 02/08/19 1451 BP: 155/89 Pulse: 95 Resp: 16 Temp: 98.1 °F (36.7 °C) SpO2: 100% Weight: 86.2 kg (190 lb) Height: 5' 4\" (1.626 m) Physical Exam  
Constitutional: She is oriented to person, place, and time. She appears well-developed and well-nourished. No facial swelling, able to open & close mouth easily HENT:  
Head: Normocephalic and atraumatic. Right Ear: External ear normal.  
Left Ear: External ear normal.  
Nose: Nose normal.  
Mouth/Throat: Uvula is midline, oropharynx is clear and moist and mucous membranes are normal. No oral lesions. No trismus in the jaw. Abnormal dentition. Dental caries present. No dental abscesses or uvula swelling. No oropharyngeal exudate. Eyes: Conjunctivae and EOM are normal. Pupils are equal, round, and reactive to light. Neck: Normal range of motion. Neck supple. Musculoskeletal: Normal range of motion. Neurological: She is alert and oriented to person, place, and time. Skin: Skin is warm and dry. Psychiatric: She has a normal mood and affect. Her behavior is normal.  
Nursing note and vitals reviewed. Diagnostic Study Results Labs - No results found for this or any previous visit (from the past 12 hour(s)). Radiologic Studies - No orders to display CT Results  (Last 48 hours) None CXR Results  (Last 48 hours) None Medications given in the ED- 
 Medications - No data to display Medical Decision Making I am the first provider for this patient. I reviewed the vital signs, available nursing notes, past medical history, past surgical history, family history and social history. Vital Signs-Reviewed the patient's vital signs. Pulse Oximetry Analysis - 100% on RA Records Reviewed: Nursing Notes and Old Medical Records Procedures: 
Procedures ED Course:  
2:58 PM Initial assessment performed. The patients presenting problems have been discussed, and they are in agreement with the care plan formulated and outlined with them. I have encouraged them to ask questions as they arise throughout their visit. Discussion: 23yoF c/o dental pain for months acutely worsening last 2-3 days. Seen by dentist today rx clindamycin, awaiting clearance from her hematologist due to h/o thrombocytopenia before any procedural fix will be completed. Will rx T3 & offer referral to [facility]. Diagnosis and Disposition DISCHARGE NOTE: 
3:08 PM  
Elyssa Conte's  results have been reviewed with her. She has been counseled regarding her diagnosis, treatment, and plan. She verbally conveys understanding and agreement of the signs, symptoms, diagnosis, treatment and prognosis and additionally agrees to follow up as discussed. She also agrees with the care-plan and conveys that all of her questions have been answered. I have also provided discharge instructions for her that include: educational information regarding their diagnosis and treatment, and list of reasons why they would want to return to the ED prior to their follow-up appointment, should her condition change. She has been provided with education for proper emergency department utilization. CLINICAL IMPRESSION: 
 
1. Dental caries 2. Pain, dental   
 
 
PLAN: 
1. D/C Home 2. Current Discharge Medication List  
  
START taking these medications Details acetaminophen-codeine (TYLENOL-CODEINE #3) 300-30 mg per tablet Take 1 Tab by mouth every four (4) hours as needed for Pain. Max Daily Amount: 6 Tabs. Qty: 12 Tab, Refills: 0 Associated Diagnoses: Pain, dental  
  
  
 
3. Follow-up Information Follow up With Specialties Details Why Contact Info [address]  Schedule an appointment as soon as possible for a visit in 2 days For hematology follow up [address] [facility], [address] [place] [address] 
[phone] Refer to list for dental follow up [facility] EMERGENCY DEPT Emergency Medicine Go to As needed, if symptoms worsen [address] [place] [address] 
[phone]  
  
 
_______________________________ Attestations: This note is prepared by Naresh, acting as Scribe for Elissa Be. Home Herrera PA-C:  The scribe's documentation has been prepared under my direction and personally reviewed by me in its entirety. I confirm that the note above accurately reflects all work, treatment, procedures, and medical decision making performed by me. 
_______________________________

## 2019-04-06 ENCOUNTER — HOSPITAL ENCOUNTER (EMERGENCY)
Age: 24
Discharge: HOME | End: 2019-04-06
Attending: EMERGENCY MEDICINE
Payer: MEDICAID

## 2019-04-06 ENCOUNTER — APPOINTMENT (OUTPATIENT)
Dept: GENERAL RADIOLOGY | Age: 24
End: 2019-04-06
Attending: PHYSICIAN ASSISTANT
Payer: MEDICAID

## 2019-04-06 VITALS
HEART RATE: 71 BPM | DIASTOLIC BLOOD PRESSURE: 62 MMHG | TEMPERATURE: 98.6 F | WEIGHT: 190 LBS | OXYGEN SATURATION: 99 % | RESPIRATION RATE: 16 BRPM | SYSTOLIC BLOOD PRESSURE: 123 MMHG | BODY MASS INDEX: 32.44 KG/M2 | HEIGHT: 64 IN

## 2019-04-06 DIAGNOSIS — M62.830 BACK SPASM: ICD-10-CM

## 2019-04-06 DIAGNOSIS — S46.912A STRAIN OF LEFT SHOULDER, INITIAL ENCOUNTER: ICD-10-CM
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
## 2019-04-06 PROCEDURE — 99282 EMERGENCY DEPT VISIT SF MDM: CPT

## 2019-04-06 PROCEDURE — 71045 X-RAY EXAM CHEST 1 VIEW: CPT

## 2019-04-06 RX ORDER — CYCLOBENZAPRINE HCL 10 MG
10 TABLET ORAL
Qty: 12 TAB | Refills: 0 | Status: SHIPPED | OUTPATIENT
Start: 2019-04-06
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
## 2020-11-11 ENCOUNTER — APPOINTMENT (OUTPATIENT)
Dept: ULTRASOUND IMAGING | Age: 25
End: 2020-11-11
Attending: PHYSICIAN ASSISTANT
Payer: MEDICAID

## 2020-11-11 ENCOUNTER — HOSPITAL ENCOUNTER (EMERGENCY)
Age: 25
Discharge: HOME | End: 2020-11-11
Attending: EMERGENCY MEDICINE
Payer: MEDICAID

## 2020-11-11 VITALS
OXYGEN SATURATION: 100 % | DIASTOLIC BLOOD PRESSURE: 72 MMHG | BODY MASS INDEX: 36.37 KG/M2 | HEART RATE: 90 BPM | TEMPERATURE: 98.5 F | SYSTOLIC BLOOD PRESSURE: 142 MMHG | HEIGHT: 64 IN | WEIGHT: 213 LBS | RESPIRATION RATE: 20 BRPM

## 2020-11-11 DIAGNOSIS — N93.8 DUB (DYSFUNCTIONAL UTERINE BLEEDING): ICD-10-CM

## 2020-11-11 DIAGNOSIS — D25.9 UTERINE LEIOMYOMA, UNSPECIFIED LOCATION: ICD-10-CM

## 2020-11-11 DIAGNOSIS — R10.2 ACUTE PELVIC PAIN, FEMALE: Primary | ICD-10-CM

## 2020-11-11 LAB
ALBUMIN SERPL-MCNC: 3.6 G/DL (ref 3.4–5)
ALBUMIN/GLOB SERPL: 0.9 {RATIO} (ref 0.8–1.7)
ALP SERPL-CCNC: 66 U/L (ref 45–117)
ALT SERPL-CCNC: 34 U/L (ref 13–56)
ANION GAP SERPL CALC-SCNC: 5 MMOL/L (ref 3–18)
AST SERPL-CCNC: 29 U/L (ref 10–38)
BASOPHILS # BLD: 0 K/UL (ref 0–0.1)
BASOPHILS NFR BLD: 0 % (ref 0–2)
BILIRUB SERPL-MCNC: 0.5 MG/DL (ref 0.2–1)
BUN SERPL-MCNC: 10 MG/DL (ref 7–18)
BUN/CREAT SERPL: 12 (ref 12–20)
CALCIUM SERPL-MCNC: 9.1 MG/DL (ref 8.5–10.1)
CHLORIDE SERPL-SCNC: 106 MMOL/L (ref 100–111)
CO2 SERPL-SCNC: 29 MMOL/L (ref 21–32)
CREAT SERPL-MCNC: 0.86 MG/DL (ref 0.6–1.3)
DIFFERENTIAL METHOD BLD: ABNORMAL
EOSINOPHIL # BLD: 0.2 K/UL (ref 0–0.4)
EOSINOPHIL NFR BLD: 2 % (ref 0–5)
ERYTHROCYTE [DISTWIDTH] IN BLOOD BY AUTOMATED COUNT: 13.4 % (ref 11.6–14.5)
GLOBULIN SER CALC-MCNC: 3.8 G/DL (ref 2–4)
GLUCOSE SERPL-MCNC: 90 MG/DL (ref 74–99)
HCG SERPL QL: NEGATIVE
HCT VFR BLD AUTO: 43.1 % (ref 35–45)
HGB BLD-MCNC: 14.4 G/DL (ref 12–16)
LYMPHOCYTES # BLD: 4 K/UL (ref 0.9–3.6)
LYMPHOCYTES NFR BLD: 42 % (ref 21–52)
MCH RBC QN AUTO: 30.1 PG (ref 24–34)
MCHC RBC AUTO-ENTMCNC: 33.4 G/DL (ref 31–37)
MCV RBC AUTO: 90 FL (ref 74–97)
MONOCYTES # BLD: 0.7 K/UL (ref 0.05–1.2)
MONOCYTES NFR BLD: 7 % (ref 3–10)
NEUTS SEG # BLD: 4.8 K/UL (ref 1.8–8)
NEUTS SEG NFR BLD: 49 % (ref 40–73)
PLATELET # BLD AUTO: 187 K/UL (ref 135–420)
PMV BLD AUTO: 11.7 FL (ref 9.2–11.8)
POTASSIUM SERPL-SCNC: 3.5 MMOL/L (ref 3.5–5.5)
PROT SERPL-MCNC: 7.4 G/DL (ref 6.4–8.2)
RBC # BLD AUTO: 4.79 M/UL (ref 4.2–5.3)
SERVICE CMNT-IMP: NORMAL
SODIUM SERPL-SCNC: 140 MMOL/L (ref 136–145)
WBC # BLD AUTO: 9.7 K/UL (ref 4.6–13.2)
WET PREP GENITAL: NORMAL

## 2020-11-11 PROCEDURE — 87210 SMEAR WET MOUNT SALINE/INK: CPT

## 2020-11-11 PROCEDURE — 80053 COMPREHEN METABOLIC PANEL: CPT

## 2020-11-11 PROCEDURE — 99284 EMERGENCY DEPT VISIT MOD MDM: CPT

## 2020-11-11 PROCEDURE — 87491 CHLMYD TRACH DNA AMP PROBE: CPT

## 2020-11-11 PROCEDURE — 76830 TRANSVAGINAL US NON-OB: CPT

## 2020-11-11 PROCEDURE — 85025 COMPLETE CBC W/AUTO DIFF WBC: CPT

## 2020-11-11 PROCEDURE — 84703 CHORIONIC GONADOTROPIN ASSAY: CPT

## 2020-11-11 NOTE — ED PROVIDER NOTES
EMERGENCY DEPARTMENT HISTORY AND PHYSICAL EXAM    Date: 11/11/2020  Patient Name: Britni Jonas    History of Presenting Illness     Chief Complaint   Patient presents with    Abdominal Pain    Vaginal Bleeding         History Provided By: Patient    Britni Jonas is a 22 y.o. female with no PMHX who presents to the emergency department C/O pelvic pain. Associated sxs include bleeding. She reports 5 days of lower pelvic cramping type pain also associated with some vaginal bleeding is started today. Patient states that she is midcycle her last menstrual cycle started on 10/23. Patient states she has never had abnormal menstrual cycles in the past.  She does not take birth control. She is sexually active. Pt denies fever, recent illness, dysuria, concern for STDs, concern for pregnancy, and any other sxs or complaints. PCP: None    Current Outpatient Medications   Medication Sig Dispense Refill    cyclobenzaprine (FLEXERIL) 10 mg tablet Take 1 Tab by mouth three (3) times daily as needed for Muscle Spasm(s). 12 Tab 0    acetaminophen-codeine (TYLENOL-CODEINE #3) 300-30 mg per tablet Take 1 Tab by mouth every four (4) hours as needed for Pain. Max Daily Amount: 6 Tabs. 12 Tab 0    ibuprofen (MOTRIN) 800 mg tablet Take 1 Tab by mouth every eight (8) hours as needed. 30 Tab 2    pnv w/o calcium-iron fum-fa 27-1 mg tab Take  by mouth.          Past History     Past Medical History:  Past Medical History:   Diagnosis Date    Anxiety     Asthma     Depression     anxiety       Past Surgical History:  Past Surgical History:   Procedure Laterality Date    HX HERNIA REPAIR  childhood       Family History:  Family History   Problem Relation Age of Onset    No Known Problems Mother     Diabetes Father     No Known Problems Maternal Grandmother     No Known Problems Maternal Grandfather     No Known Problems Paternal Grandmother     No Known Problems Paternal Grandfather        Social History:  Social History     Tobacco Use    Smoking status: Never Smoker    Smokeless tobacco: Never Used   Substance Use Topics    Alcohol use: No    Drug use: No       Allergies: Allergies   Allergen Reactions    Shrimp Itching    Tramadol Itching         Review of Systems   Review of Systems   Constitutional: Negative for fever. Gastrointestinal: Positive for abdominal pain. Negative for diarrhea, nausea and vomiting. Genitourinary: Positive for pelvic pain and vaginal bleeding. Negative for dysuria. All other systems reviewed and are negative. Physical Exam     Vitals:    11/11/20 1202 11/11/20 1848   BP: (!) 149/79 (!) 142/72   Pulse: (!) 104 90   Resp: 18 20   Temp: 98.5 °F (36.9 °C)    SpO2: 100% 100%   Weight: 96.6 kg (213 lb)    Height: 5' 4\" (1.626 m)      Physical Exam  Vitals signs and nursing note reviewed. Exam conducted with a chaperone present. Constitutional:       General: She is not in acute distress. Appearance: She is well-developed. She is obese. She is not ill-appearing. HENT:      Head: Normocephalic and atraumatic. Cardiovascular:      Rate and Rhythm: Normal rate and regular rhythm. Pulmonary:      Effort: Pulmonary effort is normal.      Breath sounds: Normal breath sounds. Abdominal:      General: Abdomen is flat. Bowel sounds are normal.      Palpations: Abdomen is soft. Tenderness: There is no abdominal tenderness. Genitourinary:     Exam position: Lithotomy position. Vagina: Bleeding present. Cervix: No cervical motion tenderness. Uterus: Not tender. Skin:     General: Skin is warm and dry. Neurological:      General: No focal deficit present. Mental Status: She is alert and oriented to person, place, and time.    Psychiatric:         Mood and Affect: Mood normal.         Behavior: Behavior normal.         Diagnostic Study Results     Labs -     Recent Results (from the past 12 hour(s))   CBC WITH AUTOMATED DIFF Collection Time: 11/11/20  1:30 PM   Result Value Ref Range    WBC 9.7 4.6 - 13.2 K/uL    RBC 4.79 4.20 - 5.30 M/uL    HGB 14.4 12.0 - 16.0 g/dL    HCT 43.1 35.0 - 45.0 %    MCV 90.0 74.0 - 97.0 FL    MCH 30.1 24.0 - 34.0 PG    MCHC 33.4 31.0 - 37.0 g/dL    RDW 13.4 11.6 - 14.5 %    PLATELET 661 859 - 078 K/uL    MPV 11.7 9.2 - 11.8 FL    NEUTROPHILS 49 40 - 73 %    LYMPHOCYTES 42 21 - 52 %    MONOCYTES 7 3 - 10 %    EOSINOPHILS 2 0 - 5 %    BASOPHILS 0 0 - 2 %    ABS. NEUTROPHILS 4.8 1.8 - 8.0 K/UL    ABS. LYMPHOCYTES 4.0 (H) 0.9 - 3.6 K/UL    ABS. MONOCYTES 0.7 0.05 - 1.2 K/UL    ABS. EOSINOPHILS 0.2 0.0 - 0.4 K/UL    ABS. BASOPHILS 0.0 0.0 - 0.1 K/UL    DF AUTOMATED     METABOLIC PANEL, COMPREHENSIVE    Collection Time: 11/11/20  1:30 PM   Result Value Ref Range    Sodium 140 136 - 145 mmol/L    Potassium 3.5 3.5 - 5.5 mmol/L    Chloride 106 100 - 111 mmol/L    CO2 29 21 - 32 mmol/L    Anion gap 5 3.0 - 18 mmol/L    Glucose 90 74 - 99 mg/dL    BUN 10 7.0 - 18 MG/DL    Creatinine 0.86 0.6 - 1.3 MG/DL    BUN/Creatinine ratio 12 12 - 20      GFR est AA >60 >60 ml/min/1.73m2    GFR est non-AA >60 >60 ml/min/1.73m2    Calcium 9.1 8.5 - 10.1 MG/DL    Bilirubin, total 0.5 0.2 - 1.0 MG/DL    ALT (SGPT) 34 13 - 56 U/L    AST (SGOT) 29 10 - 38 U/L    Alk. phosphatase 66 45 - 117 U/L    Protein, total 7.4 6.4 - 8.2 g/dL    Albumin 3.6 3.4 - 5.0 g/dL    Globulin 3.8 2.0 - 4.0 g/dL    A-G Ratio 0.9 0.8 - 1.7     HCG QL SERUM    Collection Time: 11/11/20  1:30 PM   Result Value Ref Range    HCG, Ql. Negative NEG     WET PREP    Collection Time: 11/11/20  3:03 PM    Specimen: Vagina   Result Value Ref Range    Special Requests: NO SPECIAL REQUESTS      Wet prep NO YEAST,TRICHOMONAS OR CLUE CELLS NOTED         Radiologic Studies -   US TRANSVAGINAL W DOPPLER   Final Result   IMPRESSION:      Fibroid uterus. No acute or focal abnormality otherwise to explain patient's   abnormal bleeding.            CT Results  (Last 48 hours) None        CXR Results  (Last 48 hours)    None          Medications given in the ED-  Medications - No data to display      Medical Decision Making   I am the first provider for this patient. I reviewed the vital signs, available nursing notes, past medical history, past surgical history, family history and social history. Vital Signs-Reviewed the patient's vital signs. Pulse Oximetry Analysis - 100% on RA     Records Reviewed: Nursing Notes    Procedures:  Pelvic Exam  Performed by: PA  Type of exam performed: bimanual and speculum. Vaginal exam:  bleeding. Bleeding: moderate  Cervical exam:  no cervical motion tenderness. Specimen(s) collected:  chlamydia and GC. Bimanual exam:  normal.    Patient tolerance: Patient tolerated the procedure well with no immediate complications          ED Course:   12:56 PM   Initial assessment performed. The patients presenting problems have been discussed, and they are in agreement with the care plan formulated and outlined with them. I have encouraged them to ask questions as they arise throughout their visit. 4:05 PM  Reviewed case with RED Jarquin who assumed care of patient at shift change. 44-year-old female complaining of acute onset of pelvic pain and abnormal vaginal bleeding acute onset today. This is midcycle bleeding. Negative hCG, normal CBC, stable vitals benign exam.  Pelvic exam reveals a mild vaginal bleeding. Negative wet prep GC chlamydia cultures pending. Ultrasound of pelvis pending at this time. Likely DUB. She will need GYN follow-up. 6:15 PM progress note  Ultrasound reveals uterine fibroid but nothing else acute. Patient's H&H is stable, she is comfortable going home at this time to follow-up with her gynecologist for further management. Written by RED Smith      Diagnosis and Disposition       DISCHARGE NOTE:  Elyssa Conte's  results have been reviewed with her.   She has been counseled regarding her diagnosis, treatment, and plan. She verbally conveys understanding and agreement of the signs, symptoms, diagnosis, treatment and prognosis and additionally agrees to follow up as discussed. She also agrees with the care-plan and conveys that all of her questions have been answered. I have also provided discharge instructions for her that include: educational information regarding their diagnosis and treatment, and list of reasons why they would want to return to the ED prior to their follow-up appointment, should her condition change. She has been provided with education for proper emergency department utilization. CLINICAL IMPRESSION:    1. Acute pelvic pain, female    2. DUB (dysfunctional uterine bleeding)    3. Uterine leiomyoma, unspecified location        PLAN:  1. D/C Home  2. Discharge Medication List as of 11/11/2020  6:23 PM        3. Follow-up Information     Follow up With Specialties Details Why Contact Info    Cynthia Simms, DO Obstetrics & Gynecology Schedule an appointment as soon as possible for a visit  [address] [place] [address]  [phone]      [facility] EMERGENCY DEPT Emergency Medicine  As needed, If symptoms worsen [address] [address] [phone]                  Please note that this dictation was completed with [employer], the computer voice recognition software. Quite often unanticipated grammatical, syntax, homophones, and other interpretive errors are inadvertently transcribed by the computer software. Please disregard these errors. Please excuse any errors that have escaped final proofreading.

## 2020-11-11 NOTE — DISCHARGE INSTRUCTIONS
Patient Education        Abnormal Uterine Bleeding: Care Instructions  Your Care Instructions     Abnormal uterine bleeding is irregular bleeding from the uterus that is longer or heavier than usual or does not occur at your regular time. Sometimes it is caused by changes in hormone levels. It can also be caused by growths in the uterus, such as fibroids or polyps. Sometimes a cause cannot be found. You may have heavy bleeding when you are not expecting your period. Your doctor may suggest a pregnancy test, if you think you are pregnant. Follow-up care is a key part of your treatment and safety. Be sure to make and go to all appointments, and call your doctor if you are having problems. It's also a good idea to know your test results and keep a list of the medicines you take. How can you care for yourself at home? · Be safe with medicines. Take pain medicines exactly as directed. ? If the doctor gave you a prescription medicine for pain, take it as prescribed. ? If you are not taking a prescription pain medicine, ask your doctor if you can take an over-the-counter medicine. · You may be low in iron because of blood loss. Eat a balanced diet that is high in iron and vitamin C. Foods rich in iron include red meat, shellfish, eggs, beans, and leafy green vegetables. Talk to your doctor about whether you need to take iron pills or a multivitamin. When should you call for help? Call 911 anytime you think you may need emergency care. For example, call if:    · You passed out (lost consciousness). Call your doctor now or seek immediate medical care if:    · You have new or worse belly or pelvic pain.     · You have severe vaginal bleeding.     · You feel dizzy or lightheaded, or you feel like you may faint. Watch closely for changes in your health, and be sure to contact your doctor if:    · You think you may be pregnant.     · Your bleeding gets worse.     · You do not get better as expected.    Where can you learn more? Go to http://www.gray.com/  Enter I327 in the search box to learn more about \"Abnormal Uterine Bleeding: Care Instructions. \"  Current as of: November 8, 2019               Content Version: 12.6  © 3293-6634 [employer]. Care instructions adapted under license by [employer] (which disclaims liability or warranty for this information). If you have questions about a medical condition or this instruction, always ask your healthcare professional. [address] any warranty or liability for your use of this information. Patient Education        Uterine Fibroids: Care Instructions  Your Care Instructions     Uterine fibroids are growths in the uterus. Fibroids aren't cancer. Doctors don't know what causes fibroids. Fibroids are very common in women during their childbearing years. Fibroids can grow on the inside of the uterus, in the muscle wall of the uterus, or near the outside wall of the uterus. In some women, fibroids cause painful cramps and heavy periods. In these cases, taking anti-inflammatory medicines, birth control pills, or using an intrauterine device (IUD) often helps decrease symptoms. Sometimes surgery is needed to treat fibroids. But if you are near menopause, you may want to wait and see if your symptoms get better. Most fibroids shrink and go away after menopause, when your menstrual periods stop completely. Follow-up care is a key part of your treatment and safety. Be sure to make and go to all appointments, and call your doctor if you are having problems. It's also a good idea to know your test results and keep a list of the medicines you take. How can you care for yourself at home? · If your doctor gave you medicine, take it as exactly as prescribed. Be safe with medicines. Call your doctor if you think you are having a problem with your medicine. · Take anti-inflammatory medicines for pain.  These include ibuprofen (Advil, Motrin) and naproxen (Aleve). Read and follow all instructions on the label. · Use heat, such as a hot water bottle or a heating pad set on low, or a warm bath to relax tense muscles and relieve cramping. Put a thin cloth between the heating pad and your skin. Never go to sleep with a heating pad on. · Lie down and put a pillow under your knees. Or, lie on your side and bring your knees up to your chest. These positions may help relieve belly pain or pressure. · Keep track of how many sanitary pads or tampons you use each day. · Get at least 30 minutes of exercise on most days of the week. Walking is a good choice. You also may want to do other activities, such as running, swimming, cycling, or playing tennis or team sports. · If you bleed longer than usual or have heavy bleeding, take a daily multivitamin with iron. When should you call for help? Call your doctor now or seek immediate medical care if:    · You have severe vaginal bleeding.     · You have new or worse belly or pelvic pain. Watch closely for changes in your health, and be sure to contact your doctor if:    · You have unusual vaginal bleeding.     · You do not get better as expected. Where can you learn more? Go to http://www.gray.com/  Enter B121 in the search box to learn more about \"Uterine Fibroids: Care Instructions. \"  Current as of: November 8, 2019               Content Version: 12.6  © 0245-5004 [employer]. Care instructions adapted under license by [employer] (which disclaims liability or warranty for this information). If you have questions about a medical condition or this instruction, always ask your healthcare professional. [place] [address] any warranty or liability for your use of this information.
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
## 2024-12-02 NOTE — LETTER
Azithromycin Pregnancy And Lactation Text: This medication is considered safe during pregnancy and is also secreted in breast milk. [facility] [place] 
[facility] EMERGENCY DEPT 
[address] [address] 
[phone] Work/School Note Date: 4/6/2019 To Whom It May concern: 
 
Elyssa Conte was seen and treated today in the emergency room by the following provider(s): 
Attending Provider: Nguyen Payan MD 
Physician Assistant: RED Gomes. Elyssa Conte may return to work on 4/8/19 Sincerely, 
 
 
 
 
RED Valadez 
 
 
 
 Benzoyl Peroxide Counseling: Patient counseled that medicine may cause skin irritation and bleach clothing.  In the event of skin irritation, the patient was advised to reduce the amount of the drug applied or use it less frequently.   The patient verbalized understanding of the proper use and possible adverse effects of benzoyl peroxide.  All of the patient's questions and concerns were addressed. Erythromycin Pregnancy And Lactation Text: This medication is Pregnancy Category B and is considered safe during pregnancy. It is also excreted in breast milk. Topical Sulfur Applications Counseling: Topical Sulfur Counseling: Patient counseled that this medication may cause skin irritation or allergic reactions.  In the event of skin irritation, the patient was advised to reduce the amount of the drug applied or use it less frequently.   The patient verbalized understanding of the proper use and possible adverse effects of topical sulfur application.  All of the patient's questions and concerns were addressed. Winlevi Counseling:  I discussed with the patient the risks of topical clascoterone including but not limited to erythema, scaling, itching, and stinging. Patient voiced their understanding. Isotretinoin Pregnancy And Lactation Text: This medication is Pregnancy Category X and is considered extremely dangerous during pregnancy. It is unknown if it is excreted in breast milk. Topical Clindamycin Counseling: Patient counseled that this medication may cause skin irritation or allergic reactions.  In the event of skin irritation, the patient was advised to reduce the amount of the drug applied or use it less frequently.   The patient verbalized understanding of the proper use and possible adverse effects of clindamycin.  All of the patient's questions and concerns were addressed. Spironolactone Pregnancy And Lactation Text: This medication can cause feminization of the male fetus and should be avoided during pregnancy. The active metabolite is also found in breast milk. Doxycycline Pregnancy And Lactation Text: This medication is Pregnancy Category D and not consider safe during pregnancy. It is also excreted in breast milk but is considered safe for shorter treatment courses. Minocycline Pregnancy And Lactation Text: This medication is Pregnancy Category D and not consider safe during pregnancy. It is also excreted in breast milk. Tazorac Counseling:  Patient advised that medication is irritating and drying.  Patient may need to apply sparingly and wash off after an hour before eventually leaving it on overnight.  The patient verbalized understanding of the proper use and possible adverse effects of tazorac.  All of the patient's questions and concerns were addressed. High Dose Vitamin A Pregnancy And Lactation Text: High dose vitamin A therapy is contraindicated during pregnancy and breast feeding. Aklief Pregnancy And Lactation Text: It is unknown if this medication is safe to use during pregnancy.  It is unknown if this medication is excreted in breast milk.  Breastfeeding women should use the topical cream on the smallest area of the skin for the shortest time needed while breastfeeding.  Do not apply to nipple and areola. Detail Level: Detailed Topical Retinoid Pregnancy And Lactation Text: This medication is Pregnancy Category C. It is unknown if this medication is excreted in breast milk. Include Pregnancy/Lactation Warning?: No Dapsone Counseling: I discussed with the patient the risks of dapsone including but not limited to hemolytic anemia, agranulocytosis, rashes, methemoglobinemia, kidney failure, peripheral neuropathy, headaches, GI upset, and liver toxicity.  Patients who start dapsone require monitoring including baseline LFTs and weekly CBCs for the first month, then every month thereafter.  The patient verbalized understanding of the proper use and possible adverse effects of dapsone.  All of the patient's questions and concerns were addressed. Azelaic Acid Pregnancy And Lactation Text: This medication is considered safe during pregnancy and breast feeding. Bactrim Counseling:  I discussed with the patient the risks of sulfa antibiotics including but not limited to GI upset, allergic reaction, drug rash, diarrhea, dizziness, photosensitivity, and yeast infections.  Rarely, more serious reactions can occur including but not limited to aplastic anemia, agranulocytosis, methemoglobinemia, blood dyscrasias, liver or kidney failure, lung infiltrates or desquamative/blistering drug rashes. Isotretinoin Counseling: Patient should get monthly blood tests, not donate blood, not drive at night if vision affected, not share medication, and not undergo elective surgery for 6 months after tx completed. Side effects reviewed, pt to contact office should one occur. Doxycycline Counseling:  Patient counseled regarding possible photosensitivity and increased risk for sunburn.  Patient instructed to avoid sunlight, if possible.  When exposed to sunlight, patients should wear protective clothing, sunglasses, and sunscreen.  The patient was instructed to call the office immediately if the following severe adverse effects occur:  hearing changes, easy bruising/bleeding, severe headache, or vision changes.  The patient verbalized understanding of the proper use and possible adverse effects of doxycycline.  All of the patient's questions and concerns were addressed. Topical Clindamycin Pregnancy And Lactation Text: This medication is Pregnancy Category B and is considered safe during pregnancy. It is unknown if it is excreted in breast milk. Benzoyl Peroxide Pregnancy And Lactation Text: This medication is Pregnancy Category C. It is unknown if benzoyl peroxide is excreted in breast milk. Birth Control Pills Counseling: Birth Control Pill Counseling: I discussed with the patient the potential side effects of OCPs including but not limited to increased risk of stroke, heart attack, thrombophlebitis, deep venous thrombosis, hepatic adenomas, breast changes, GI upset, headaches, and depression.  The patient verbalized understanding of the proper use and possible adverse effects of OCPs. All of the patient's questions and concerns were addressed. Azithromycin Counseling:  I discussed with the patient the risks of azithromycin including but not limited to GI upset, allergic reaction, drug rash, diarrhea, and yeast infections. Winlevi Pregnancy And Lactation Text: This medication is considered safe during pregnancy and breastfeeding. Minocycline Counseling: Patient advised regarding possible photosensitivity and discoloration of the teeth, skin, lips, tongue and gums.  Patient instructed to avoid sunlight, if possible.  When exposed to sunlight, patients should wear protective clothing, sunglasses, and sunscreen.  The patient was instructed to call the office immediately if the following severe adverse effects occur:  hearing changes, easy bruising/bleeding, severe headache, or vision changes.  The patient verbalized understanding of the proper use and possible adverse effects of minocycline.  All of the patient's questions and concerns were addressed. Erythromycin Counseling:  I discussed with the patient the risks of erythromycin including but not limited to GI upset, allergic reaction, drug rash, diarrhea, increase in liver enzymes, and yeast infections. Spironolactone Counseling: Patient advised regarding risks of diarrhea, abdominal pain, hyperkalemia, birth defects (for female patients), liver toxicity and renal toxicity. The patient may need blood work to monitor liver and kidney function and potassium levels while on therapy. The patient verbalized understanding of the proper use and possible adverse effects of spironolactone.  All of the patient's questions and concerns were addressed. Topical Sulfur Applications Pregnancy And Lactation Text: This medication is Pregnancy Category C and has an unknown safety profile during pregnancy. It is unknown if this topical medication is excreted in breast milk. Tetracycline Counseling: Patient counseled regarding possible photosensitivity and increased risk for sunburn.  Patient instructed to avoid sunlight, if possible.  When exposed to sunlight, patients should wear protective clothing, sunglasses, and sunscreen.  The patient was instructed to call the office immediately if the following severe adverse effects occur:  hearing changes, easy bruising/bleeding, severe headache, or vision changes.  The patient verbalized understanding of the proper use and possible adverse effects of tetracycline.  All of the patient's questions and concerns were addressed. Patient understands to avoid pregnancy while on therapy due to potential birth defects. Tazorac Pregnancy And Lactation Text: This medication is not safe during pregnancy. It is unknown if this medication is excreted in breast milk. Azelaic Acid Counseling: Patient counseled that medicine may cause skin irritation and to avoid applying near the eyes.  In the event of skin irritation, the patient was advised to reduce the amount of the drug applied or use it less frequently.   The patient verbalized understanding of the proper use and possible adverse effects of azelaic acid.  All of the patient's questions and concerns were addressed. Sarecycline Counseling: Patient advised regarding possible photosensitivity and discoloration of the teeth, skin, lips, tongue and gums.  Patient instructed to avoid sunlight, if possible.  When exposed to sunlight, patients should wear protective clothing, sunglasses, and sunscreen.  The patient was instructed to call the office immediately if the following severe adverse effects occur:  hearing changes, easy bruising/bleeding, severe headache, or vision changes.  The patient verbalized understanding of the proper use and possible adverse effects of sarecycline.  All of the patient's questions and concerns were addressed. High Dose Vitamin A Counseling: Side effects reviewed, pt to contact office should one occur. Bactrim Pregnancy And Lactation Text: This medication is Pregnancy Category D and is known to cause fetal risk.  It is also excreted in breast milk. Dapsone Pregnancy And Lactation Text: This medication is Pregnancy Category C and is not considered safe during pregnancy or breast feeding. Aklief counseling:  Patient advised to apply a pea-sized amount only at bedtime and wait 30 minutes after washing their face before applying.  If too drying, patient may add a non-comedogenic moisturizer.  The most commonly reported side effects including irritation, redness, scaling, dryness, stinging, burning, itching, and increased risk of sunburn.  The patient verbalized understanding of the proper use and possible adverse effects of retinoids.  All of the patient's questions and concerns were addressed. Birth Control Pills Pregnancy And Lactation Text: This medication should be avoided if pregnant and for the first 30 days post-partum. Topical Retinoid counseling:  Patient advised to apply a pea-sized amount only at bedtime and wait 30 minutes after washing their face before applying.  If too drying, patient may add a non-comedogenic moisturizer. The patient verbalized understanding of the proper use and possible adverse effects of retinoids.  All of the patient's questions and concerns were addressed. [Follow-Up] : a follow-up visit [Pre-Visit Preparation] : pre-visit preparation was done [Formal Caregiver] : formal caregiver [FreeTextEntry1] : COPD, HTN, PMR,HLD, Insomnia, anxiety

## 2025-03-09 NOTE — LACTATION NOTE
OB HISTORY AND PHYSICAL      SUBJECTIVE:    35 y.o. female  currently at 38w6d [facility] complicated by:      Patient Active Problem List    Diagnosis     Pre-existing type 2 diabetes mellitus during pregnancy, antepartum [O24.119]     High-risk pregnancy [O09.90]     Macrosomia of fetus affecting management of mother in third trimester [O36.63X0]     Polyhydramnios in third trimester [O40.3XX0]     Anemia during pregnancy in third trimester [O99.013]     Request for sterilization [Z30.2]     Maternal care for breech presentation, single gestation [O32.1XX0]     Abnormal glucose tolerance test [R73.09]     Positive urine drug screen [R82.5]     Short interval between pregnancies affecting pregnancy, antepartum [O09.899]     Antepartum multigravida of advanced maternal age [O09.529]        CC/HPI:  Presents with Scheduled IOL.     ROS: No leaking fluid, No vaginal bleeding, No contractions, and Is feeling adequate FM    Past OB History:   OB History    Para Term  AB Living   7 5 5  1 5   SAB IAB Ectopic Molar Multiple Live Births   1    0 5      # Outcome Date GA Lbr Tyrone/2nd Weight Sex Type Anes PTL Lv   7 Current            6 Term 24 39w0d 01:38 / 00:04 3570 g (7 lb 13.9 oz) F Vag-Spont OTHER N AMARJIT   5 Term 22 39w5d 01:27 / 00:16 3354 g (7 lb 6.3 oz) F Vag-Spont EPI N AMARJIT      Birth Comments: scale 4   4 Term 11 39w0d  2807 g (6 lb 3 oz) F Vag-Spont EPI N AMARJIT   3 Term 05/22/10 39w0d  2892 g (6 lb 6 oz) F Vag-Spont EPI N AMARJIT   2 Term 09 40w0d  3402 g (7 lb 8 oz) M Vag-Spont EPI N AMARJIT   1 SAB  9w0d    SAB         Obstetric Comments   GDM only w prior preg // ap 2024         Prenatal Labs:    Prenatal Results       Initial Prenatal Labs       Test Value Reference Range Date Time    Hemoglobin  12.1 g/dL 12.0 - 15.9 24 1132    Hematocrit  35.7 % 34.0 - 46.6 24 1132    Platelets  236 10*3/mm3 140 - 450 08/19/24 1132    Rubella IgG  7.37 index Immune >0.99  Mom attempting to feed baby in modified cradle hold. Baby is latched, nursing briefly. Discussed colostrum, blood sugars, nursing pattern/expectations. Encouraged skin-to-skin. Offered help with feedings. Left mom and baby skin-to-skin. 0 - Mom asking for water for baby, states baby is fussy. Baby was quiet in crib when I went in, discussed sucking needs. Mom states she has no milk, discussed colostrum, sucking needs. Pacifier given at mom's request.  Informed mom and grandmother that we do not give water to infants. 08/19/24 1132    Hepatitis B SAg  Non-Reactive  Non-Reactive 08/19/24 1132    Hepatitis C Ab  Non-Reactive  Non-Reactive 08/19/24 1132    RPR  Non-Reactive  Non-Reactive 08/19/24 1132    T. Pallidum Ab   Non-Reactive  Non-Reactive 01/10/25 1043    ABO  O   08/19/24 1132    Rh  Positive   08/19/24 1132    Antibody Screen  Negative   08/19/24 1132    HIV  Non-Reactive  Non-Reactive 08/19/24 1132    Urine Culture  No growth   08/19/24 1132    Gonorrhea  Negative  Negative 08/19/24 1132    Chlamydia  Negative  Negative 08/19/24 1132    TSH        HgB A1c         Varicella IgG        Hemoglobinopathy Fractionation  Comment   11/15/21 1110    Hemoglobinopathy (genetic testing)        Cystic fibrosis         Spinal muscular atrophy        Fragile X                  Fetal testing        Test Value Reference Range Date Time    NIPT        MSAFP        AFP-4                  2nd and 3rd Trimester       Test Value Reference Range Date Time    Hemoglobin (repeated)  11.3 g/dL 12.0 - 15.9 02/20/25 1129       11.2 g/dL 12.0 - 15.9 01/10/25 1043    Hematocrit (repeated)  32.8 % 34.0 - 46.6 02/20/25 1129       32.2 % 34.0 - 46.6 01/10/25 1043    Platelets   199 10*3/mm3 140 - 450 02/20/25 1129       209 10*3/mm3 140 - 450 01/10/25 1043       236 10*3/mm3 140 - 450 08/19/24 1132    1 hour GTT   130 mg/dL 65 - 139 09/27/24 1450    Antibody Screen (repeated)        3rd TM syphilis scrn (repeated)  RPR         3rd TM syphilis scrn (repeated) TP-Ab  Non-Reactive  Non-Reactive 01/10/25 1043    3rd TM syphilis screen TB-Ab (FTA)  Non-Reactive  Non-Reactive 01/10/25 1043    Syphilis cascade test TP-Ab (EIA)        Syphilis cascade TPPA        GTT Fasting        GTT 1 Hr        GTT 2 Hr        GTT 3 Hr        Group B Strep  Negative  Negative 02/20/25 1107              Other testing        Test Value Reference Range Date Time    Parvo IgG         CMV IgG                   Drug Screening       Test Value Reference Range Date Time     Amphetamine Screen        Barbiturate Screen  Negative  Negative 08/19/24 1132    Benzodiazepine Screen  Negative  Negative 08/19/24 1132    Methadone Screen  Negative  Negative 08/19/24 1132    Phencyclidine Screen        Opiates Screen  Negative  Negative 08/19/24 1132    THC Screen  Positive  Negative 08/19/24 1132    Cocaine Screen  Negative  Negative 08/19/24 1132    Propoxyphene Screen        Buprenorphine Screen        Methamphetamine Screen        Oxycodone Screen  Negative  Negative 08/19/24 1132    Tricyclic Antidepressants Screen                   PMHx:    Past Medical History:   Diagnosis Date    Abnormal Pap smear of cervix     hx Gestational diabetes     Pre gestational diabetes mellitus        Medications:  Alcohol Swabs, DHA Omega 3, FreeStyle Lite, Lancets, ferrous sulfate, glucose blood, glucose monitor, metFORMIN, pantoprazole, and prenatal vitamin 28-0.8    Allergies:  No Known Allergies    PSHx:    Past Surgical History:   Procedure Laterality Date    NO PAST SURGERIES         Social History:    reports that she has quit smoking. Her smoking use included cigarettes. She started smoking about 4 years ago. She does not have any smokeless tobacco history on file. She reports that she does not currently use alcohol. She reports that she does not currently use drugs after having used the following drugs: Marijuana.    Family History: Non contributory    Immunizations: See prenatal record for Tdap, Flu, Covid and/or other vaccinations    PHYSICAL EXAM:      Chaperone present during breast and/or pelvic/cervical exam if performed.  General- NAD, alert and oriented, appropriate  Psych- normal mood, good memory  Cardiovascular- Regular rhythm, no murnurs  Respiratory- CTA to bases, no wheezes  Abdomen- Gravid, non tender  Fundus-  Non tender.  Size: consistent with dates  EFW- 9 lbs  US [facility] 3/6/25 8#7oz, TANNER 29   Pelvis-  Adequate   Cervix- 2cm / 30% / -3  Presentation- VTX  Extremities/DTRs- No edema,  bilaterally equal, no signs of DVT    Fetal HR: Doptones 110-160, see last OV note  Contractions: Not complaining of any regular contractions        ASSESSMENT:  38w6d  Scheduled IOL at 39+ weeks  Pregest DM- metformin, well controlled  Polyhydramnios  AMA  Anemia of preg  Short interval preg  Request for sterilization- pw 1/10/25    Lab Results   Component Value Date    STREPGPB Negative 2025       PLAN:  Admit  Delivery:   IOL  Pitocin and cervical catheter  Desires bilateral tubal occlusion if CS  Intrapartum BLOOD SUGAR CHECKS, w SSI, goal BS <120    Plan of care NLT hospital course, R/B/A/potential SE, suspected length 12-24+hrs have been reviewed with patient and any family or friends present, questions answered to her/his/their satisfaction.  Pt desires to proceed as above.    Counseling:The patient was counseled on the risks, benefits and alternatives of Induction.  Risks reviewed, but are not limited to: bleeding, transfusion, fetal intolerance,  (possibly emergent),  and uterine rupture.  She declines expectant management or  and desires induction.  All her questions have been answered to her satisfaction and she desires to proceed.          Electronically signed by Marisa Delacruz DO, 25, 3:40 PM EDT.    [facility]  [address] [place] [address]  Dept: [phone]  Loc: [phone]

## (undated) DEVICE — PACK PROCEDURE SURG C SECT DMC

## (undated) DEVICE — SUTURE VCRL SZ 0 L36IN ABSRB VLT L40MM CT 1/2 CIR J358H

## (undated) DEVICE — MEDI-VAC NON-CONDUCTIVE SUCTION TUBING: Brand: CARDINAL HEALTH

## (undated) DEVICE — STERILE POLYISOPRENE POWDER-FREE SURGICAL GLOVES: Brand: PROTEXIS

## (undated) DEVICE — SUTURE PLN GUT SZ 2-0 L27IN ABSRB YELLOWISH TAN L70MM XLH 53T

## (undated) DEVICE — 1.5L THIN WALL CAN: Brand: CRD

## (undated) DEVICE — SHEET,DRAPE,40X58,STERILE: Brand: MEDLINE

## (undated) DEVICE — SUT MONOCRYL PLUS UD 4-0 --

## (undated) DEVICE — MEDI-VAC NON-CONDUCTIVE SUCTION TUBING 6MM X 6.1M (20 FT.) L: Brand: CARDINAL HEALTH

## (undated) DEVICE — (D)SYR 10ML 1/5ML GRAD NSAF -- PKGING CHANGE USE ITEM 338027

## (undated) DEVICE — TOWEL SURG W16XL26IN BLU NONFENESTRATED DLX ST 2 PER PK

## (undated) DEVICE — SOL IRR SOD CL 0.9% 1000ML BTL --

## (undated) DEVICE — S/USE RESUS KIT W/O MASK (10): Brand: FISHER & PAYKEL HEALTHCARE

## (undated) DEVICE — KENDALL SCD EXPRESS SLEEVES, KNEE LENGTH, MEDIUM: Brand: KENDALL SCD

## (undated) DEVICE — MASK ROUND 60MM FPH (10) S/USE: Brand: FISHER & PAYKEL HEALTHCARE

## (undated) DEVICE — SPONGE LAP 18X18IN STRL -- 5/PK

## (undated) DEVICE — SUTURE MCRYL SZ 0 L36IN ABSRB UD L36MM CT-1 1/2 CIR Y946H

## (undated) DEVICE — BARRIER TISS ADH ABSRB 3X4IN -- GYNECARE INTERCEED

## (undated) DEVICE — Device

## (undated) DEVICE — SUTURE ABSORBABLE BRAIDED 2-0 CT-1 27 IN UD VICRYL J259H

## (undated) DEVICE — INTENDED FOR TISSUE SEPARATION, AND OTHER PROCEDURES THAT REQUIRE A SHARP SURGICAL BLADE TO PUNCTURE OR CUT.: Brand: BARD-PARKER SAFETY BLADES SIZE 10, STERILE

## (undated) DEVICE — CATH KT SUC CTRL VLV 10FR --

## (undated) DEVICE — GOWN,AURORA,FABRIC-REINFORCED,X-LARGE: Brand: MEDLINE

## (undated) DEVICE — REM POLYHESIVE ADULT PATIENT RETURN ELECTRODE: Brand: VALLEYLAB

## (undated) DEVICE — FLEX ADVANTAGE 1500CC: Brand: FLEX ADVANTAGE